# Patient Record
Sex: FEMALE | Race: WHITE | NOT HISPANIC OR LATINO | Employment: OTHER | URBAN - NONMETROPOLITAN AREA
[De-identification: names, ages, dates, MRNs, and addresses within clinical notes are randomized per-mention and may not be internally consistent; named-entity substitution may affect disease eponyms.]

---

## 2023-02-27 LAB
ALANINE AMINOTRANSFERASE (SGPT) (U/L) IN SER/PLAS: 37 U/L (ref 7–45)
ALBUMIN (G/DL) IN SER/PLAS: 4.4 G/DL (ref 3.4–5)
ALKALINE PHOSPHATASE (U/L) IN SER/PLAS: 95 U/L (ref 33–136)
ANION GAP IN SER/PLAS: 12 MMOL/L (ref 10–20)
ASPARTATE AMINOTRANSFERASE (SGOT) (U/L) IN SER/PLAS: 25 U/L (ref 9–39)
BILIRUBIN TOTAL (MG/DL) IN SER/PLAS: 1 MG/DL (ref 0–1.2)
CALCIUM (MG/DL) IN SER/PLAS: 9.4 MG/DL (ref 8.6–10.3)
CARBON DIOXIDE, TOTAL (MMOL/L) IN SER/PLAS: 31 MMOL/L (ref 21–32)
CHLORIDE (MMOL/L) IN SER/PLAS: 101 MMOL/L (ref 98–107)
CREATININE (MG/DL) IN SER/PLAS: 0.74 MG/DL (ref 0.5–1.05)
GFR FEMALE: >90 ML/MIN/1.73M2
GLUCOSE (MG/DL) IN SER/PLAS: 89 MG/DL (ref 74–99)
POTASSIUM (MMOL/L) IN SER/PLAS: 4.1 MMOL/L (ref 3.5–5.3)
PROTEIN TOTAL: 7.4 G/DL (ref 6.4–8.2)
SODIUM (MMOL/L) IN SER/PLAS: 140 MMOL/L (ref 136–145)
THYROTROPIN (MIU/L) IN SER/PLAS BY DETECTION LIMIT <= 0.05 MIU/L: 1.51 MIU/L (ref 0.44–3.98)
UREA NITROGEN (MG/DL) IN SER/PLAS: 17 MG/DL (ref 6–23)

## 2023-02-28 LAB
ESTIMATED AVERAGE GLUCOSE FOR HBA1C: 120 MG/DL
HEMOGLOBIN A1C/HEMOGLOBIN TOTAL IN BLOOD: 5.8 %

## 2023-05-17 PROBLEM — E78.00 HYPERCHOLESTEROLEMIA: Status: ACTIVE | Noted: 2023-05-17

## 2023-05-17 RX ORDER — IBUPROFEN 800 MG/1
800 TABLET ORAL EVERY 8 HOURS PRN
COMMUNITY
End: 2024-03-25 | Stop reason: HOSPADM

## 2023-05-17 RX ORDER — CHOLECALCIFEROL (VITAMIN D3) 25 MCG
25 TABLET ORAL DAILY
COMMUNITY

## 2023-05-17 RX ORDER — ASPIRIN 81 MG/1
81 TABLET ORAL EVERY OTHER DAY
COMMUNITY
End: 2024-03-25 | Stop reason: HOSPADM

## 2023-05-17 RX ORDER — VITAMIN E MIXED 400 UNIT
400 CAPSULE ORAL DAILY
COMMUNITY

## 2023-05-17 RX ORDER — LANOLIN ALCOHOL/MO/W.PET/CERES
100 CREAM (GRAM) TOPICAL DAILY
COMMUNITY

## 2023-05-17 RX ORDER — SIMVASTATIN 20 MG/1
20 TABLET, FILM COATED ORAL NIGHTLY
COMMUNITY
End: 2023-08-23

## 2023-05-17 RX ORDER — GARLIC 1000 MG
CAPSULE ORAL
COMMUNITY
End: 2024-02-22 | Stop reason: ALTCHOICE

## 2023-05-17 RX ORDER — ASCORBIC ACID 500 MG
500 TABLET ORAL DAILY
COMMUNITY
End: 2024-03-28 | Stop reason: SINTOL

## 2023-05-23 ENCOUNTER — OFFICE VISIT (OUTPATIENT)
Dept: PRIMARY CARE | Facility: CLINIC | Age: 63
End: 2023-05-23
Payer: COMMERCIAL

## 2023-05-23 VITALS
HEART RATE: 78 BPM | SYSTOLIC BLOOD PRESSURE: 110 MMHG | OXYGEN SATURATION: 97 % | WEIGHT: 213.4 LBS | BODY MASS INDEX: 33.49 KG/M2 | HEIGHT: 67 IN | TEMPERATURE: 97.6 F | DIASTOLIC BLOOD PRESSURE: 74 MMHG

## 2023-05-23 DIAGNOSIS — K21.9 GASTROESOPHAGEAL REFLUX DISEASE, UNSPECIFIED WHETHER ESOPHAGITIS PRESENT: ICD-10-CM

## 2023-05-23 DIAGNOSIS — Z00.00 ANNUAL PHYSICAL EXAM: Primary | ICD-10-CM

## 2023-05-23 DIAGNOSIS — E78.00 HYPERCHOLESTEROLEMIA: ICD-10-CM

## 2023-05-23 DIAGNOSIS — Z12.31 ENCOUNTER FOR SCREENING MAMMOGRAM FOR MALIGNANT NEOPLASM OF BREAST: ICD-10-CM

## 2023-05-23 PROCEDURE — 1036F TOBACCO NON-USER: CPT | Performed by: INTERNAL MEDICINE

## 2023-05-23 PROCEDURE — 99214 OFFICE O/P EST MOD 30 MIN: CPT | Performed by: INTERNAL MEDICINE

## 2023-05-23 ASSESSMENT — ENCOUNTER SYMPTOMS
BRUISES/BLEEDS EASILY: 0
HEADACHES: 0
DIZZINESS: 0
SINUS PAIN: 0
PALPITATIONS: 0
LOSS OF SENSATION IN FEET: 0
WHEEZING: 0
FATIGUE: 0
UNEXPECTED WEIGHT CHANGE: 0
SORE THROAT: 0
COUGH: 0
ABDOMINAL PAIN: 0
DEPRESSION: 0
DIFFICULTY URINATING: 0
OCCASIONAL FEELINGS OF UNSTEADINESS: 0
BLOOD IN STOOL: 0
DIARRHEA: 0
ARTHRALGIAS: 0
FEVER: 0

## 2023-05-23 ASSESSMENT — ANXIETY QUESTIONNAIRES
GAD7 TOTAL SCORE: 1
6. BECOMING EASILY ANNOYED OR IRRITABLE: NOT AT ALL
4. TROUBLE RELAXING: NOT AT ALL
7. FEELING AFRAID AS IF SOMETHING AWFUL MIGHT HAPPEN: NOT AT ALL
2. NOT BEING ABLE TO STOP OR CONTROL WORRYING: NOT AT ALL
3. WORRYING TOO MUCH ABOUT DIFFERENT THINGS: SEVERAL DAYS
5. BEING SO RESTLESS THAT IT IS HARD TO SIT STILL: NOT AT ALL
1. FEELING NERVOUS, ANXIOUS, OR ON EDGE: NOT AT ALL
IF YOU CHECKED OFF ANY PROBLEMS ON THIS QUESTIONNAIRE, HOW DIFFICULT HAVE THESE PROBLEMS MADE IT FOR YOU TO DO YOUR WORK, TAKE CARE OF THINGS AT HOME, OR GET ALONG WITH OTHER PEOPLE: NOT DIFFICULT AT ALL

## 2023-05-23 ASSESSMENT — PATIENT HEALTH QUESTIONNAIRE - PHQ9
2. FEELING DOWN, DEPRESSED OR HOPELESS: NOT AT ALL
1. LITTLE INTEREST OR PLEASURE IN DOING THINGS: NOT AT ALL
SUM OF ALL RESPONSES TO PHQ9 QUESTIONS 1 AND 2: 0

## 2023-05-23 NOTE — PROGRESS NOTES
Subjective   Patient ID: Reanna Pineda is a 62 y.o. female who presents for Annual Exam, Results, and Hyperlipidemia.    - Low-dose CT scanning very low score 6 on the LAD patient to maximize medical management  - Reflux disease symptoms not persistent counseled about meal size on diet controlled follow-up if no improvement  - Hypercholesterolemia continue simvastatin repeat lipid profile in August  - Needs mammogram in August 2023  - Thyroid function test and CMP within normal limits patient reassured  - Morbid obesity improving BMI now 33  I spent >15minutes minutes face to face with individial providing recommendations for nutrition choices and exercise plan to help achieve weight reduction.  - Needs Pap smear refer patient to GYN  -History of largely perianal sepsis required surgery twice and admission with IV antibiotics for 1 week and [facility] now resolved with  -Screening colonoscopy done in 2021 need to repeat in 2026  -Patient consumes large amount of beer on a weekly basis counseled about alcohol abstinence    Hyperlipidemia  Pertinent negatives include no chest pain.          Review of Systems   Constitutional:  Negative for fatigue, fever and unexpected weight change.   HENT:  Negative for congestion, ear discharge, ear pain, mouth sores, sinus pain and sore throat.    Eyes:  Negative for visual disturbance.   Respiratory:  Negative for cough and wheezing.    Cardiovascular:  Negative for chest pain, palpitations and leg swelling.   Gastrointestinal:  Negative for abdominal pain, blood in stool and diarrhea.   Genitourinary:  Negative for difficulty urinating.   Musculoskeletal:  Negative for arthralgias.   Skin:  Negative for rash.   Neurological:  Negative for dizziness and headaches.   Hematological:  Does not bruise/bleed easily.   Psychiatric/Behavioral:  Negative for behavioral problems.    All other systems reviewed and are negative.      Objective   Lab Results   Component Value Date     "HGBA1C 5.8 (A) 02/27/2023      /74   Pulse 78   Temp 36.4 °C (97.6 °F)   Ht 1.708 m (5' 7.25\")   Wt 96.8 kg (213 lb 6.4 oz)   SpO2 97%   BMI 33.18 kg/m²   Lab Results   Component Value Date    ALT 37 02/27/2023    AST 25 02/27/2023     02/27/2023    K 4.1 02/27/2023     02/27/2023    CREATININE 0.74 02/27/2023    BUN 17 02/27/2023    CO2 31 02/27/2023    TSH 1.51 02/27/2023    HGBA1C 5.8 (A) 02/27/2023     par LM 0  LAD 6.34  LCx 0  RCA 0     Total 6.3  Physical Exam  Vitals and nursing note reviewed.   Constitutional:       Appearance: Normal appearance.   HENT:      Head: Normocephalic.      Nose: Nose normal.   Eyes:      Conjunctiva/sclera: Conjunctivae normal.      Pupils: Pupils are equal, round, and reactive to light.   Cardiovascular:      Rate and Rhythm: Regular rhythm.   Pulmonary:      Effort: Pulmonary effort is normal.      Breath sounds: Normal breath sounds.   Abdominal:      General: Abdomen is flat.      Palpations: Abdomen is soft.   Musculoskeletal:      Cervical back: Neck supple.   Skin:     General: Skin is warm.   Neurological:      General: No focal deficit present.      Mental Status: She is oriented to person, place, and time.   Psychiatric:         Mood and Affect: Mood normal.         Assessment/Plan   Reanna was seen today for annual exam, results and hyperlipidemia.  Diagnoses and all orders for this visit:  Annual physical exam (Primary)  -     Lipid Panel; Future  Hypercholesterolemia  -     Lipid Panel; Future  Gastroesophageal reflux disease, unspecified whether esophagitis present  Encounter for screening mammogram for malignant neoplasm of breast  -     BI mammo bilateral screening tomosynthesis; Future  Other orders  -     Follow Up In Primary Care; Future   - Low-dose CT scanning very low score 6 on the LAD patient to maximize medical management  - Reflux disease symptoms not persistent counseled about meal size on diet controlled follow-up if no " improvement  - Hypercholesterolemia continue simvastatin repeat lipid profile in August  - Needs mammogram in August 2023  - Thyroid function test and CMP within normal limits patient reassured  - Morbid obesity improving BMI now 33  I spent >15minutes minutes face to face with individial providing recommendations for nutrition choices and exercise plan to help achieve weight reduction.  - Needs Pap smear refer patient to GYN  -History of largely perianal sepsis required surgery twice and admission with IV antibiotics for 1 week and [facility] now resolved with  -Screening colonoscopy done in 2021 need to repeat in 2026  -Patient consumes large amount of beer on a weekly basis counseled about alcohol abstinence

## 2023-08-08 ENCOUNTER — LAB (OUTPATIENT)
Dept: LAB | Facility: LAB | Age: 63
End: 2023-08-08
Payer: COMMERCIAL

## 2023-08-08 DIAGNOSIS — Z00.00 ANNUAL PHYSICAL EXAM: ICD-10-CM

## 2023-08-08 DIAGNOSIS — E78.00 HYPERCHOLESTEROLEMIA: ICD-10-CM

## 2023-08-08 LAB
CHOLESTEROL (MG/DL) IN SER/PLAS: 230 MG/DL (ref 0–199)
CHOLESTEROL IN HDL (MG/DL) IN SER/PLAS: 72.7 MG/DL
CHOLESTEROL/HDL RATIO: 3.2
LDL: 132 MG/DL (ref 0–99)
TRIGLYCERIDE (MG/DL) IN SER/PLAS: 127 MG/DL (ref 0–149)
VLDL: 25 MG/DL (ref 0–40)

## 2023-08-08 PROCEDURE — 80061 LIPID PANEL: CPT

## 2023-08-08 PROCEDURE — 36415 COLL VENOUS BLD VENIPUNCTURE: CPT

## 2023-08-22 DIAGNOSIS — E78.00 HYPERCHOLESTEROLEMIA: ICD-10-CM

## 2023-08-23 ENCOUNTER — OFFICE VISIT (OUTPATIENT)
Dept: PRIMARY CARE | Facility: CLINIC | Age: 63
End: 2023-08-23
Payer: COMMERCIAL

## 2023-08-23 VITALS
HEART RATE: 77 BPM | TEMPERATURE: 97.6 F | OXYGEN SATURATION: 100 % | WEIGHT: 210.6 LBS | BODY MASS INDEX: 32.74 KG/M2 | SYSTOLIC BLOOD PRESSURE: 124 MMHG | DIASTOLIC BLOOD PRESSURE: 80 MMHG

## 2023-08-23 DIAGNOSIS — Z12.31 ENCOUNTER FOR SCREENING MAMMOGRAM FOR MALIGNANT NEOPLASM OF BREAST: ICD-10-CM

## 2023-08-23 DIAGNOSIS — Z00.00 ANNUAL PHYSICAL EXAM: ICD-10-CM

## 2023-08-23 DIAGNOSIS — E78.00 HYPERCHOLESTEROLEMIA: Primary | ICD-10-CM

## 2023-08-23 PROCEDURE — 99214 OFFICE O/P EST MOD 30 MIN: CPT | Performed by: INTERNAL MEDICINE

## 2023-08-23 PROCEDURE — 1036F TOBACCO NON-USER: CPT | Performed by: INTERNAL MEDICINE

## 2023-08-23 RX ORDER — SIMVASTATIN 20 MG/1
20 TABLET, FILM COATED ORAL NIGHTLY
Qty: 90 TABLET | Refills: 1 | Status: SHIPPED | OUTPATIENT
Start: 2023-08-23 | End: 2023-08-23 | Stop reason: SDUPTHER

## 2023-08-23 RX ORDER — SIMVASTATIN 20 MG/1
20 TABLET, FILM COATED ORAL NIGHTLY
Qty: 90 TABLET | Refills: 1 | Status: SHIPPED | OUTPATIENT
Start: 2023-08-23 | End: 2024-02-26 | Stop reason: SDUPTHER

## 2023-08-23 ASSESSMENT — ENCOUNTER SYMPTOMS
BRUISES/BLEEDS EASILY: 0
SORE THROAT: 0
DIFFICULTY URINATING: 0
DIZZINESS: 0
WHEEZING: 0
SINUS PAIN: 0
COUGH: 0
HEADACHES: 0
ARTHRALGIAS: 0
DIARRHEA: 0
BLOOD IN STOOL: 0
UNEXPECTED WEIGHT CHANGE: 0
FATIGUE: 0
FEVER: 0
ABDOMINAL PAIN: 0
PALPITATIONS: 0

## 2023-08-23 NOTE — PROGRESS NOTES
Subjective   Patient ID: Reanna Pineda is a 62 y.o. female who presents for 3 month medical management and Leg Swelling (Bilateral-  worse at end of day).    - Hypercholesterolemia patient counseled about maximizing low-fat diet low triglyceride diet  Counseled about weight loss  Continue simvastatin  Repeat blood work in our exam in 6 months  - Low-dose CT scanning very low score 6 on the LAD patient to maximize medical management  - Reflux disease symptoms not persistent counseled about meal size on diet controlled follow-up if no improvement.  -Obesity BMI now 32  I spent >15minutes minutes face to face with individial providing recommendations for nutrition choices and exercise plan to help achieve weight reduction.  -History of largely perianal sepsis required surgery twice and admission with IV antibiotics for 1 week and [facility] now resolved with  -Screening colonoscopy done in 2021 need to repeat in 2026  -Patient consumes large amount of beer on a weekly basis counseled about alcohol abstinence.             Review of Systems   Constitutional:  Negative for fatigue, fever and unexpected weight change.   HENT:  Negative for congestion, ear discharge, ear pain, mouth sores, sinus pain and sore throat.    Eyes:  Negative for visual disturbance.   Respiratory:  Negative for cough and wheezing.    Cardiovascular:  Negative for chest pain, palpitations and leg swelling.   Gastrointestinal:  Negative for abdominal pain, blood in stool and diarrhea.   Genitourinary:  Negative for difficulty urinating.   Musculoskeletal:  Negative for arthralgias.   Skin:  Negative for rash.   Neurological:  Negative for dizziness and headaches.   Hematological:  Does not bruise/bleed easily.   Psychiatric/Behavioral:  Negative for behavioral problems.    All other systems reviewed and are negative.      Objective   Lab Results   Component Value Date    HGBA1C 5.8 (A) 02/27/2023      /80   Pulse 77   Temp 36.4 °C  (97.6 °F)   Wt 95.5 kg (210 lb 9.6 oz)   SpO2 100%   BMI 32.74 kg/m²   Lab Results   Component Value Date    CHOL 230 (H) 08/08/2023    TRIG 127 08/08/2023    HDL 72.7 08/08/2023    ALT 37 02/27/2023    AST 25 02/27/2023     02/27/2023    K 4.1 02/27/2023     02/27/2023    CREATININE 0.74 02/27/2023    BUN 17 02/27/2023    CO2 31 02/27/2023    TSH 1.51 02/27/2023    HGBA1C 5.8 (A) 02/27/2023     par   Physical Exam  Vitals and nursing note reviewed.   Constitutional:       Appearance: Normal appearance.   HENT:      Head: Normocephalic.      Nose: Nose normal.   Eyes:      Conjunctiva/sclera: Conjunctivae normal.      Pupils: Pupils are equal, round, and reactive to light.   Cardiovascular:      Rate and Rhythm: Regular rhythm.   Pulmonary:      Effort: Pulmonary effort is normal.      Breath sounds: Normal breath sounds.   Abdominal:      General: Abdomen is flat.      Palpations: Abdomen is soft.   Musculoskeletal:      Cervical back: Neck supple.   Skin:     General: Skin is warm.   Neurological:      General: No focal deficit present.      Mental Status: She is oriented to person, place, and time.   Psychiatric:         Mood and Affect: Mood normal.         Assessment/Plan   Reanna was seen today for 3 month medical management and leg swelling.  Diagnoses and all orders for this visit:  Hypercholesterolemia (Primary)  -     simvastatin (Zocor) 20 mg tablet; Take 1 tablet (20 mg) by mouth once daily at bedtime.  Encounter for screening mammogram for malignant neoplasm of breast  -     BI mammo bilateral screening tomosynthesis; Future  Annual physical exam  -     CBC and Auto Differential; Future  -     Comprehensive Metabolic Panel; Future  -     Lipid Panel; Future  -     TSH with reflex to Free T4 if abnormal; Future  -     Hemoglobin A1C; Future  Other orders  -     Follow Up In Primary Care  -     Follow Up In Primary Care - Health Maintenance; Future   - Hypercholesterolemia patient counseled  about maximizing low-fat diet low triglyceride diet  Counseled about weight loss  Continue simvastatin  Repeat blood work in our exam in 6 months  - Low-dose CT scanning very low score 6 on the LAD patient to maximize medical management  - Reflux disease symptoms not persistent counseled about meal size on diet controlled follow-up if no improvement.  -Obesity BMI now 32  I spent >15minutes minutes face to face with individial providing recommendations for nutrition choices and exercise plan to help achieve weight reduction.  -History of largely perianal sepsis required surgery twice and admission with IV antibiotics for 1 week and [facility] now resolved with  -Screening colonoscopy done in 2021 need to repeat in 2026  -Patient consumes large amount of beer on a weekly basis counseled about alcohol abstinence.

## 2023-12-27 ENCOUNTER — LAB (OUTPATIENT)
Dept: LAB | Facility: LAB | Age: 63
End: 2023-12-27
Payer: COMMERCIAL

## 2023-12-27 DIAGNOSIS — Z00.00 ANNUAL PHYSICAL EXAM: ICD-10-CM

## 2023-12-27 LAB
ALBUMIN SERPL BCP-MCNC: 4.5 G/DL (ref 3.4–5)
ALP SERPL-CCNC: 85 U/L (ref 33–136)
ALT SERPL W P-5'-P-CCNC: 34 U/L (ref 7–45)
ANION GAP SERPL CALC-SCNC: 12 MMOL/L (ref 10–20)
AST SERPL W P-5'-P-CCNC: 24 U/L (ref 9–39)
BASOPHILS # BLD AUTO: 0.03 X10*3/UL (ref 0–0.1)
BASOPHILS NFR BLD AUTO: 0.5 %
BILIRUB SERPL-MCNC: 1 MG/DL (ref 0–1.2)
BUN SERPL-MCNC: 14 MG/DL (ref 6–23)
CALCIUM SERPL-MCNC: 9.4 MG/DL (ref 8.6–10.3)
CHLORIDE SERPL-SCNC: 100 MMOL/L (ref 98–107)
CHOLEST SERPL-MCNC: 231 MG/DL (ref 0–199)
CHOLESTEROL/HDL RATIO: 3.3
CO2 SERPL-SCNC: 31 MMOL/L (ref 21–32)
CREAT SERPL-MCNC: 0.79 MG/DL (ref 0.5–1.05)
EOSINOPHIL # BLD AUTO: 0.14 X10*3/UL (ref 0–0.7)
EOSINOPHIL NFR BLD AUTO: 2.4 %
ERYTHROCYTE [DISTWIDTH] IN BLOOD BY AUTOMATED COUNT: 13.3 % (ref 11.5–14.5)
EST. AVERAGE GLUCOSE BLD GHB EST-MCNC: 114 MG/DL
GFR SERPL CREATININE-BSD FRML MDRD: 84 ML/MIN/1.73M*2
GLUCOSE SERPL-MCNC: 107 MG/DL (ref 74–99)
HBA1C MFR BLD: 5.6 %
HCT VFR BLD AUTO: 46.3 % (ref 36–46)
HDLC SERPL-MCNC: 70.7 MG/DL
HGB BLD-MCNC: 14.8 G/DL (ref 12–16)
IMM GRANULOCYTES # BLD AUTO: 0.02 X10*3/UL (ref 0–0.7)
IMM GRANULOCYTES NFR BLD AUTO: 0.3 % (ref 0–0.9)
LDLC SERPL CALC-MCNC: 135 MG/DL
LYMPHOCYTES # BLD AUTO: 2.12 X10*3/UL (ref 1.2–4.8)
LYMPHOCYTES NFR BLD AUTO: 35.6 %
MCH RBC QN AUTO: 29.8 PG (ref 26–34)
MCHC RBC AUTO-ENTMCNC: 32 G/DL (ref 32–36)
MCV RBC AUTO: 93 FL (ref 80–100)
MONOCYTES # BLD AUTO: 0.56 X10*3/UL (ref 0.1–1)
MONOCYTES NFR BLD AUTO: 9.4 %
NEUTROPHILS # BLD AUTO: 3.08 X10*3/UL (ref 1.2–7.7)
NEUTROPHILS NFR BLD AUTO: 51.8 %
NON HDL CHOLESTEROL: 160 MG/DL (ref 0–149)
NRBC BLD-RTO: 0 /100 WBCS (ref 0–0)
PLATELET # BLD AUTO: 252 X10*3/UL (ref 150–450)
POTASSIUM SERPL-SCNC: 4.3 MMOL/L (ref 3.5–5.3)
PROT SERPL-MCNC: 7 G/DL (ref 6.4–8.2)
RBC # BLD AUTO: 4.96 X10*6/UL (ref 4–5.2)
SODIUM SERPL-SCNC: 139 MMOL/L (ref 136–145)
TRIGL SERPL-MCNC: 125 MG/DL (ref 0–149)
TSH SERPL-ACNC: 3.91 MIU/L (ref 0.44–3.98)
VLDL: 25 MG/DL (ref 0–40)
WBC # BLD AUTO: 6 X10*3/UL (ref 4.4–11.3)

## 2023-12-27 PROCEDURE — 36415 COLL VENOUS BLD VENIPUNCTURE: CPT

## 2023-12-27 PROCEDURE — 83036 HEMOGLOBIN GLYCOSYLATED A1C: CPT

## 2024-01-18 ENCOUNTER — APPOINTMENT (OUTPATIENT)
Dept: CARDIOLOGY | Facility: HOSPITAL | Age: 64
End: 2024-01-18
Payer: COMMERCIAL

## 2024-01-18 ENCOUNTER — HOSPITAL ENCOUNTER (OUTPATIENT)
Facility: HOSPITAL | Age: 64
Setting detail: OBSERVATION
Discharge: HOME | End: 2024-01-19
Attending: EMERGENCY MEDICINE | Admitting: INTERNAL MEDICINE
Payer: COMMERCIAL

## 2024-01-18 ENCOUNTER — APPOINTMENT (OUTPATIENT)
Dept: RADIOLOGY | Facility: HOSPITAL | Age: 64
End: 2024-01-18
Payer: COMMERCIAL

## 2024-01-18 DIAGNOSIS — R42 VERTIGO: ICD-10-CM

## 2024-01-18 DIAGNOSIS — K62.89 ANUSITIS: Primary | ICD-10-CM

## 2024-01-18 LAB
ALBUMIN SERPL BCP-MCNC: 4.3 G/DL (ref 3.4–5)
ALP SERPL-CCNC: 87 U/L (ref 33–136)
ALT SERPL W P-5'-P-CCNC: 40 U/L (ref 7–45)
ANION GAP SERPL CALC-SCNC: 13 MMOL/L (ref 10–20)
APPEARANCE UR: CLEAR
AST SERPL W P-5'-P-CCNC: 26 U/L (ref 9–39)
BASOPHILS # BLD AUTO: 0.02 X10*3/UL (ref 0–0.1)
BASOPHILS NFR BLD AUTO: 0.3 %
BILIRUB SERPL-MCNC: 0.9 MG/DL (ref 0–1.2)
BILIRUB UR STRIP.AUTO-MCNC: NEGATIVE MG/DL
BNP SERPL-MCNC: 22 PG/ML (ref 0–99)
BUN SERPL-MCNC: 14 MG/DL (ref 6–23)
CALCIUM SERPL-MCNC: 9.7 MG/DL (ref 8.6–10.3)
CARDIAC TROPONIN I PNL SERPL HS: <3 NG/L (ref 0–13)
CARDIAC TROPONIN I PNL SERPL HS: <3 NG/L (ref 0–13)
CHLORIDE SERPL-SCNC: 103 MMOL/L (ref 98–107)
CHOLEST SERPL-MCNC: 216 MG/DL (ref 0–199)
CHOLESTEROL/HDL RATIO: 3.2
CO2 SERPL-SCNC: 30 MMOL/L (ref 21–32)
COLOR UR: YELLOW
CREAT SERPL-MCNC: 0.72 MG/DL (ref 0.5–1.05)
EGFRCR SERPLBLD CKD-EPI 2021: >90 ML/MIN/1.73M*2
EOSINOPHIL # BLD AUTO: 0.05 X10*3/UL (ref 0–0.7)
EOSINOPHIL NFR BLD AUTO: 0.7 %
ERYTHROCYTE [DISTWIDTH] IN BLOOD BY AUTOMATED COUNT: 13.2 % (ref 11.5–14.5)
GLUCOSE SERPL-MCNC: 105 MG/DL (ref 74–99)
GLUCOSE UR STRIP.AUTO-MCNC: NEGATIVE MG/DL
HCT VFR BLD AUTO: 46.8 % (ref 36–46)
HDLC SERPL-MCNC: 66.7 MG/DL
HGB BLD-MCNC: 15.2 G/DL (ref 12–16)
IMM GRANULOCYTES # BLD AUTO: 0.01 X10*3/UL (ref 0–0.7)
IMM GRANULOCYTES NFR BLD AUTO: 0.1 % (ref 0–0.9)
KETONES UR STRIP.AUTO-MCNC: ABNORMAL MG/DL
LACTATE SERPL-SCNC: 1.1 MMOL/L (ref 0.4–2)
LDLC SERPL CALC-MCNC: 135 MG/DL
LEUKOCYTE ESTERASE UR QL STRIP.AUTO: NEGATIVE
LYMPHOCYTES # BLD AUTO: 1.65 X10*3/UL (ref 1.2–4.8)
LYMPHOCYTES NFR BLD AUTO: 23.6 %
MCH RBC QN AUTO: 30.2 PG (ref 26–34)
MCHC RBC AUTO-ENTMCNC: 32.5 G/DL (ref 32–36)
MCV RBC AUTO: 93 FL (ref 80–100)
MONOCYTES # BLD AUTO: 0.46 X10*3/UL (ref 0.1–1)
MONOCYTES NFR BLD AUTO: 6.6 %
NEUTROPHILS # BLD AUTO: 4.81 X10*3/UL (ref 1.2–7.7)
NEUTROPHILS NFR BLD AUTO: 68.7 %
NITRITE UR QL STRIP.AUTO: NEGATIVE
NON HDL CHOLESTEROL: 149 MG/DL (ref 0–149)
NRBC BLD-RTO: 0 /100 WBCS (ref 0–0)
PH UR STRIP.AUTO: 6 [PH]
PLATELET # BLD AUTO: 257 X10*3/UL (ref 150–450)
POTASSIUM SERPL-SCNC: 4.7 MMOL/L (ref 3.5–5.3)
PROT SERPL-MCNC: 7.1 G/DL (ref 6.4–8.2)
PROT UR STRIP.AUTO-MCNC: NEGATIVE MG/DL
RBC # BLD AUTO: 5.03 X10*6/UL (ref 4–5.2)
RBC # UR STRIP.AUTO: NEGATIVE /UL
SODIUM SERPL-SCNC: 141 MMOL/L (ref 136–145)
SP GR UR STRIP.AUTO: 1.04
TRIGL SERPL-MCNC: 72 MG/DL (ref 0–149)
UROBILINOGEN UR STRIP.AUTO-MCNC: <2 MG/DL
VLDL: 14 MG/DL (ref 0–40)
WBC # BLD AUTO: 7 X10*3/UL (ref 4.4–11.3)

## 2024-01-18 PROCEDURE — 2500000004 HC RX 250 GENERAL PHARMACY W/ HCPCS (ALT 636 FOR OP/ED): Performed by: NURSE PRACTITIONER

## 2024-01-18 PROCEDURE — 84484 ASSAY OF TROPONIN QUANT: CPT | Performed by: EMERGENCY MEDICINE

## 2024-01-18 PROCEDURE — 93005 ELECTROCARDIOGRAM TRACING: CPT

## 2024-01-18 PROCEDURE — 81003 URINALYSIS AUTO W/O SCOPE: CPT | Performed by: EMERGENCY MEDICINE

## 2024-01-18 PROCEDURE — 83880 ASSAY OF NATRIURETIC PEPTIDE: CPT | Performed by: NURSE PRACTITIONER

## 2024-01-18 PROCEDURE — 74177 CT ABD & PELVIS W/CONTRAST: CPT | Mod: FOREIGN READ | Performed by: RADIOLOGY

## 2024-01-18 PROCEDURE — 87040 BLOOD CULTURE FOR BACTERIA: CPT | Mod: GENLAB | Performed by: EMERGENCY MEDICINE

## 2024-01-18 PROCEDURE — G0378 HOSPITAL OBSERVATION PER HR: HCPCS

## 2024-01-18 PROCEDURE — 36415 COLL VENOUS BLD VENIPUNCTURE: CPT | Performed by: EMERGENCY MEDICINE

## 2024-01-18 PROCEDURE — 2500000004 HC RX 250 GENERAL PHARMACY W/ HCPCS (ALT 636 FOR OP/ED): Performed by: EMERGENCY MEDICINE

## 2024-01-18 PROCEDURE — 99285 EMERGENCY DEPT VISIT HI MDM: CPT | Performed by: EMERGENCY MEDICINE

## 2024-01-18 PROCEDURE — 83605 ASSAY OF LACTIC ACID: CPT | Performed by: EMERGENCY MEDICINE

## 2024-01-18 PROCEDURE — 2550000001 HC RX 255 CONTRASTS: Performed by: EMERGENCY MEDICINE

## 2024-01-18 PROCEDURE — 80061 LIPID PANEL: CPT | Performed by: NURSE PRACTITIONER

## 2024-01-18 PROCEDURE — 85025 COMPLETE CBC W/AUTO DIFF WBC: CPT | Performed by: EMERGENCY MEDICINE

## 2024-01-18 PROCEDURE — 70450 CT HEAD/BRAIN W/O DYE: CPT | Performed by: RADIOLOGY

## 2024-01-18 PROCEDURE — 2500000004 HC RX 250 GENERAL PHARMACY W/ HCPCS (ALT 636 FOR OP/ED)

## 2024-01-18 PROCEDURE — 70450 CT HEAD/BRAIN W/O DYE: CPT

## 2024-01-18 PROCEDURE — 80053 COMPREHEN METABOLIC PANEL: CPT | Performed by: EMERGENCY MEDICINE

## 2024-01-18 PROCEDURE — 83036 HEMOGLOBIN GLYCOSYLATED A1C: CPT | Mod: GENLAB | Performed by: NURSE PRACTITIONER

## 2024-01-18 PROCEDURE — 96365 THER/PROPH/DIAG IV INF INIT: CPT

## 2024-01-18 PROCEDURE — 96367 TX/PROPH/DG ADDL SEQ IV INF: CPT

## 2024-01-18 PROCEDURE — 74177 CT ABD & PELVIS W/CONTRAST: CPT | Mod: FR

## 2024-01-18 RX ORDER — CHOLECALCIFEROL (VITAMIN D3) 25 MCG
2000 TABLET ORAL DAILY
Status: DISCONTINUED | OUTPATIENT
Start: 2024-01-18 | End: 2024-01-19 | Stop reason: HOSPADM

## 2024-01-18 RX ORDER — ONDANSETRON HYDROCHLORIDE 2 MG/ML
4 INJECTION, SOLUTION INTRAVENOUS EVERY 8 HOURS PRN
Status: DISCONTINUED | OUTPATIENT
Start: 2024-01-18 | End: 2024-01-19 | Stop reason: HOSPADM

## 2024-01-18 RX ORDER — SODIUM CHLORIDE 9 MG/ML
INJECTION, SOLUTION INTRAVENOUS
Status: DISPENSED
Start: 2024-01-18 | End: 2024-01-19

## 2024-01-18 RX ORDER — HYDRALAZINE HYDROCHLORIDE 20 MG/ML
10 INJECTION INTRAMUSCULAR; INTRAVENOUS
Status: DISCONTINUED | OUTPATIENT
Start: 2024-01-18 | End: 2024-01-19 | Stop reason: HOSPADM

## 2024-01-18 RX ORDER — PANTOPRAZOLE SODIUM 40 MG/1
40 TABLET, DELAYED RELEASE ORAL
Status: DISCONTINUED | OUTPATIENT
Start: 2024-01-19 | End: 2024-01-19 | Stop reason: HOSPADM

## 2024-01-18 RX ORDER — ATORVASTATIN CALCIUM 40 MG/1
40 TABLET, FILM COATED ORAL NIGHTLY
Status: DISCONTINUED | OUTPATIENT
Start: 2024-01-18 | End: 2024-01-18

## 2024-01-18 RX ORDER — ONDANSETRON 4 MG/1
4 TABLET, FILM COATED ORAL EVERY 8 HOURS PRN
Status: DISCONTINUED | OUTPATIENT
Start: 2024-01-18 | End: 2024-01-19 | Stop reason: HOSPADM

## 2024-01-18 RX ORDER — LABETALOL HYDROCHLORIDE 5 MG/ML
10 INJECTION, SOLUTION INTRAVENOUS EVERY 10 MIN PRN
Status: DISCONTINUED | OUTPATIENT
Start: 2024-01-18 | End: 2024-01-19 | Stop reason: HOSPADM

## 2024-01-18 RX ORDER — VITAMIN E MIXED 400 UNIT
400 CAPSULE ORAL DAILY
Status: DISCONTINUED | OUTPATIENT
Start: 2024-01-18 | End: 2024-01-19 | Stop reason: HOSPADM

## 2024-01-18 RX ORDER — ACETAMINOPHEN 325 MG/1
650 TABLET ORAL EVERY 4 HOURS PRN
Status: DISCONTINUED | OUTPATIENT
Start: 2024-01-18 | End: 2024-01-19 | Stop reason: HOSPADM

## 2024-01-18 RX ORDER — ACETAMINOPHEN 650 MG/1
650 SUPPOSITORY RECTAL EVERY 4 HOURS PRN
Status: DISCONTINUED | OUTPATIENT
Start: 2024-01-18 | End: 2024-01-19 | Stop reason: HOSPADM

## 2024-01-18 RX ORDER — POLYETHYLENE GLYCOL 3350 17 G/17G
17 POWDER, FOR SOLUTION ORAL DAILY PRN
Status: DISCONTINUED | OUTPATIENT
Start: 2024-01-18 | End: 2024-01-19 | Stop reason: HOSPADM

## 2024-01-18 RX ORDER — ACETAMINOPHEN 160 MG/5ML
650 SOLUTION ORAL EVERY 4 HOURS PRN
Status: DISCONTINUED | OUTPATIENT
Start: 2024-01-18 | End: 2024-01-19 | Stop reason: HOSPADM

## 2024-01-18 RX ORDER — PNV NO.95/FERROUS FUM/FOLIC AC 28MG-0.8MG
100 TABLET ORAL DAILY
Status: DISCONTINUED | OUTPATIENT
Start: 2024-01-18 | End: 2024-01-19 | Stop reason: HOSPADM

## 2024-01-18 RX ORDER — VANCOMYCIN HYDROCHLORIDE 1.25 G/25ML
INJECTION, POWDER, LYOPHILIZED, FOR SOLUTION INTRAVENOUS
Status: DISPENSED
Start: 2024-01-18 | End: 2024-01-19

## 2024-01-18 RX ORDER — HYDRALAZINE HYDROCHLORIDE 25 MG/1
25 TABLET, FILM COATED ORAL EVERY 6 HOURS PRN
Status: DISCONTINUED | OUTPATIENT
Start: 2024-01-20 | End: 2024-01-19 | Stop reason: HOSPADM

## 2024-01-18 RX ORDER — ASPIRIN 81 MG/1
81 TABLET ORAL DAILY
Status: DISCONTINUED | OUTPATIENT
Start: 2024-01-18 | End: 2024-01-19 | Stop reason: HOSPADM

## 2024-01-18 RX ORDER — ASCORBIC ACID 500 MG
500 TABLET ORAL DAILY
Status: DISCONTINUED | OUTPATIENT
Start: 2024-01-18 | End: 2024-01-19 | Stop reason: HOSPADM

## 2024-01-18 RX ORDER — ENOXAPARIN SODIUM 100 MG/ML
40 INJECTION SUBCUTANEOUS EVERY 24 HOURS
Status: DISCONTINUED | OUTPATIENT
Start: 2024-01-18 | End: 2024-01-19 | Stop reason: HOSPADM

## 2024-01-18 RX ORDER — PANTOPRAZOLE SODIUM 40 MG/10ML
40 INJECTION, POWDER, LYOPHILIZED, FOR SOLUTION INTRAVENOUS
Status: DISCONTINUED | OUTPATIENT
Start: 2024-01-19 | End: 2024-01-19 | Stop reason: HOSPADM

## 2024-01-18 RX ORDER — ASPIRIN 81 MG/1
81 TABLET ORAL DAILY
Status: DISCONTINUED | OUTPATIENT
Start: 2024-01-19 | End: 2024-01-18

## 2024-01-18 RX ORDER — SIMVASTATIN 10 MG/1
20 TABLET, FILM COATED ORAL NIGHTLY
Status: DISCONTINUED | OUTPATIENT
Start: 2024-01-18 | End: 2024-01-19 | Stop reason: HOSPADM

## 2024-01-18 RX ADMIN — VANCOMYCIN HYDROCHLORIDE 1.25 G: 1.25 INJECTION, POWDER, LYOPHILIZED, FOR SOLUTION INTRAVENOUS at 20:45

## 2024-01-18 RX ADMIN — ENOXAPARIN SODIUM 40 MG: 40 INJECTION SUBCUTANEOUS at 21:17

## 2024-01-18 RX ADMIN — PIPERACILLIN SODIUM AND TAZOBACTAM SODIUM 4.5 G: 4; .5 INJECTION, SOLUTION INTRAVENOUS at 23:59

## 2024-01-18 RX ADMIN — PIPERACILLIN SODIUM AND TAZOBACTAM SODIUM 3.38 G: 3; .375 INJECTION, SOLUTION INTRAVENOUS at 17:39

## 2024-01-18 RX ADMIN — IOHEXOL 75 ML: 350 INJECTION, SOLUTION INTRAVENOUS at 14:52

## 2024-01-18 RX ADMIN — SIMVASTATIN 20 MG: 10 TABLET, FILM COATED ORAL at 21:16

## 2024-01-18 SDOH — SOCIAL STABILITY: SOCIAL INSECURITY: HAVE YOU HAD THOUGHTS OF HARMING ANYONE ELSE?: NO

## 2024-01-18 SDOH — SOCIAL STABILITY: SOCIAL INSECURITY: WERE YOU ABLE TO COMPLETE ALL THE BEHAVIORAL HEALTH SCREENINGS?: YES

## 2024-01-18 ASSESSMENT — COGNITIVE AND FUNCTIONAL STATUS - GENERAL
PATIENT BASELINE BEDBOUND: NO
MOBILITY SCORE: 24
MOBILITY SCORE: 24
DAILY ACTIVITIY SCORE: 24
DAILY ACTIVITIY SCORE: 24

## 2024-01-18 ASSESSMENT — ACTIVITIES OF DAILY LIVING (ADL)
GROOMING: INDEPENDENT
WALKS IN HOME: INDEPENDENT
FEEDING YOURSELF: INDEPENDENT
TOILETING: INDEPENDENT
BATHING: INDEPENDENT
DRESSING YOURSELF: INDEPENDENT
PATIENT'S MEMORY ADEQUATE TO SAFELY COMPLETE DAILY ACTIVITIES?: YES
LACK_OF_TRANSPORTATION: NO
ADEQUATE_TO_COMPLETE_ADL: YES
HEARING - RIGHT EAR: FUNCTIONAL
HEARING - LEFT EAR: FUNCTIONAL
JUDGMENT_ADEQUATE_SAFELY_COMPLETE_DAILY_ACTIVITIES: YES

## 2024-01-18 ASSESSMENT — PAIN SCALES - GENERAL: PAINLEVEL_OUTOF10: 0 - NO PAIN

## 2024-01-18 ASSESSMENT — LIFESTYLE VARIABLES
AUDIT-C TOTAL SCORE: 8
HOW OFTEN DO YOU HAVE A DRINK CONTAINING ALCOHOL: 4 OR MORE TIMES A WEEK
HOW MANY STANDARD DRINKS CONTAINING ALCOHOL DO YOU HAVE ON A TYPICAL DAY: 3 OR 4
SKIP TO QUESTIONS 9-10: 0
AUDIT-C TOTAL SCORE: 8
HOW OFTEN DO YOU HAVE 6 OR MORE DRINKS ON ONE OCCASION: WEEKLY

## 2024-01-18 ASSESSMENT — PAIN DESCRIPTION - PAIN TYPE: TYPE: ACUTE PAIN

## 2024-01-18 ASSESSMENT — PAIN - FUNCTIONAL ASSESSMENT: PAIN_FUNCTIONAL_ASSESSMENT: 0-10

## 2024-01-18 ASSESSMENT — COLUMBIA-SUICIDE SEVERITY RATING SCALE - C-SSRS
1. IN THE PAST MONTH, HAVE YOU WISHED YOU WERE DEAD OR WISHED YOU COULD GO TO SLEEP AND NOT WAKE UP?: NO
2. HAVE YOU ACTUALLY HAD ANY THOUGHTS OF KILLING YOURSELF?: NO
6. HAVE YOU EVER DONE ANYTHING, STARTED TO DO ANYTHING, OR PREPARED TO DO ANYTHING TO END YOUR LIFE?: NO

## 2024-01-18 ASSESSMENT — PAIN DESCRIPTION - FREQUENCY: FREQUENCY: INTERMITTENT

## 2024-01-18 ASSESSMENT — PATIENT HEALTH QUESTIONNAIRE - PHQ9
2. FEELING DOWN, DEPRESSED OR HOPELESS: NOT AT ALL
1. LITTLE INTEREST OR PLEASURE IN DOING THINGS: NOT AT ALL
SUM OF ALL RESPONSES TO PHQ9 QUESTIONS 1 & 2: 0

## 2024-01-18 ASSESSMENT — PAIN DESCRIPTION - LOCATION: LOCATION: OTHER (COMMENT)

## 2024-01-18 NOTE — ED PROVIDER NOTES
HPI   Chief Complaint   Patient presents with    Illness     Patient states she feels ill and thinks she has an infection in her body. She states she has been having yellow discharge from her rectum for a few weeks. She had an abscess in her rectum in 2021 and is worried it is back. She states the room is spinning and she feels lightheaded.  She has nausea        HPI                    No data recorded                Patient History   No past medical history on file.  No past surgical history on file.  Family History   Problem Relation Name Age of Onset    Other (cardiac problems) Mother      Diabetes Mother      Hypertension Mother      Cancer Mother      Other (cardiac problems) Father      Diabetes Father      Hypertension Father      Cancer Father       Social History     Tobacco Use    Smoking status: Never    Smokeless tobacco: Never   Substance Use Topics    Alcohol use: Yes     Alcohol/week: 24.0 standard drinks of alcohol     Types: 24 Standard drinks or equivalent per week    Drug use: Never       Physical Exam   ED Triage Vitals [01/18/24 1219]   Temp Heart Rate Resp BP   36.7 °C (98.1 °F) 93 16 111/79      SpO2 Temp Source Heart Rate Source Patient Position   94 % Oral Monitor Sitting      BP Location FiO2 (%)     Left arm --       Physical Exam  Constitutional:       General: She is not in acute distress.     Appearance: Normal appearance. She is not toxic-appearing.   HENT:      Head: Normocephalic and atraumatic.      Right Ear: Tympanic membrane normal.      Left Ear: Tympanic membrane normal.      Mouth/Throat:      Mouth: Mucous membranes are moist.      Pharynx: Oropharynx is clear.   Eyes:      Conjunctiva/sclera: Conjunctivae normal.      Pupils: Pupils are equal, round, and reactive to light.   Cardiovascular:      Rate and Rhythm: Normal rate and regular rhythm.      Pulses: Normal pulses.      Heart sounds: Normal heart sounds.   Pulmonary:      Effort: Pulmonary effort is normal. No  respiratory distress.      Breath sounds: Normal breath sounds. No wheezing.   Abdominal:      General: Bowel sounds are normal.      Palpations: Abdomen is soft.      Tenderness: There is no abdominal tenderness. There is no guarding or rebound.   Genitourinary:         Comments: Yellowish discharge  Musculoskeletal:         General: Normal range of motion.      Cervical back: Normal range of motion.   Skin:     General: Skin is warm and dry.   Neurological:      General: No focal deficit present.      Mental Status: She is alert and oriented to person, place, and time.         ED Course & MDM   Diagnoses as of 01/18/24 1731   Anusitis   Vertigo       Medical Decision Making  63-year-old female presents to the ER with chief complaint of dizzy not feeling well patient reports that this morning while in bed she turned her head and got instant sensation of the room is spinning.  Went back to bed for couple hours still had the sensation when she got up.  At this present time the sensation is gone.  Patient is concerned that she might have an abscess in her anus patient has significant history back in May 2021 that she had a abscess with required surgery x 2 she had 2 emergent surgeries to treat the abscess.  So the patient is very sensitive to this.  Patient came to ED for evaluation.  Patient reports that she has been having some yellow discharge from her anus.  Patient workup is fairly negative however the CAT scan did show some inflammation in her anus due to her history will be very aggressive and admit her to the hospital for IV antibiotics.  I do not believe that she needs a surgical consult at this time I think the IV antibiotics and possible infectious disease consult and patient should be safe to discharge tomorrow but again that is the inpatient decision.    EKG showing normal sinus rhythm at a ventricular rate of 71 no ST elevation or depression this was interpreted by me.    Repeat EKG showing normal sinus  rhythm at a ventricular rate of 78 no ST elevation or depression to PVCs.  Interpreted by me.        Procedure  Procedures     Carlos [facility]ongR[facility], DO  01/18/24 3874

## 2024-01-18 NOTE — PROGRESS NOTES
"Vancomycin Dosing by Pharmacy- INITIAL    Reanna Pineda is a 63 y.o. year old female who Pharmacy has been consulted for vancomycin dosing for other abdominal infection . Based on the patient's indication and renal status this patient will be dosed based on a goal AUC of 400-600. Opting to target higher end of AUC goal due to possibility of sepsis.    Renal function is currently stable.    Visit Vitals  /75   Pulse 85   Temp 36.7 °C (98.1 °F) (Oral)   Resp 18        Lab Results   Component Value Date    CREATININE 0.72 01/18/2024    CREATININE 0.79 12/27/2023    CREATININE 0.74 02/27/2023    CREATININE 0.8 11/30/2021    CREATININE 0.7 07/26/2021    CREATININE 0.8 06/16/2021        Patient weight is No results found for: \"PTWEIGHT\"    No results found for: \"CULTURE\"     No intake/output data recorded.  [unfilled]    No results found for: \"PATIENTTEMP\"       Assessment/Plan     Patient will not be given a loading dose.  Will initiate vancomycin maintenance,  1250 mg every 12 hours.     This dosing regimen is predicted by [employer] to result in the following pharmacokinetic parameters:    Regimen: 1250 mg IV every 12 hours.  Start time: 18:43 on 01/18/2024  Exposure target: AUC24 (range)400-600 mg/L.hr   AUC24,ss: 554 mg/L.hr  Probability of AUC24 > 400: 82 %  Ctrough,ss: 17.6 mg/L  Probability of Ctrough,ss > 20: 39 %  Probability of nephrotoxicity (Lodise RYAN 2009): 13 %    Follow-up level will be ordered on 1/20 at 0500 unless clinically indicated sooner.  Will continue to monitor renal function daily while on vancomycin and order serum creatinine at least every 48 hours if not already ordered.  Follow for continued vancomycin needs, clinical response, and signs/symptoms of toxicity.       Krupa Duque, PharmD       "

## 2024-01-19 ENCOUNTER — APPOINTMENT (OUTPATIENT)
Dept: RADIOLOGY | Facility: HOSPITAL | Age: 64
End: 2024-01-19
Payer: COMMERCIAL

## 2024-01-19 VITALS
OXYGEN SATURATION: 95 % | DIASTOLIC BLOOD PRESSURE: 79 MMHG | HEART RATE: 99 BPM | TEMPERATURE: 96.6 F | WEIGHT: 205 LBS | SYSTOLIC BLOOD PRESSURE: 105 MMHG | HEIGHT: 67 IN | RESPIRATION RATE: 18 BRPM | BODY MASS INDEX: 32.18 KG/M2

## 2024-01-19 LAB
ANION GAP SERPL CALC-SCNC: 13 MMOL/L (ref 10–20)
ATRIAL RATE: 71 BPM
ATRIAL RATE: 78 BPM
BUN SERPL-MCNC: 12 MG/DL (ref 6–23)
CALCIUM SERPL-MCNC: 9.3 MG/DL (ref 8.6–10.3)
CHLORIDE SERPL-SCNC: 104 MMOL/L (ref 98–107)
CO2 SERPL-SCNC: 27 MMOL/L (ref 21–32)
CREAT SERPL-MCNC: 0.76 MG/DL (ref 0.5–1.05)
EGFRCR SERPLBLD CKD-EPI 2021: 88 ML/MIN/1.73M*2
ERYTHROCYTE [DISTWIDTH] IN BLOOD BY AUTOMATED COUNT: 13.1 % (ref 11.5–14.5)
EST. AVERAGE GLUCOSE BLD GHB EST-MCNC: 123 MG/DL
GLUCOSE BLD MANUAL STRIP-MCNC: 108 MG/DL (ref 74–99)
GLUCOSE BLD MANUAL STRIP-MCNC: 125 MG/DL (ref 74–99)
GLUCOSE SERPL-MCNC: 97 MG/DL (ref 74–99)
HBA1C MFR BLD: 5.9 %
HCT VFR BLD AUTO: 46.6 % (ref 36–46)
HGB BLD-MCNC: 14.9 G/DL (ref 12–16)
HOLD SPECIMEN: NORMAL
MCH RBC QN AUTO: 29.8 PG (ref 26–34)
MCHC RBC AUTO-ENTMCNC: 32 G/DL (ref 32–36)
MCV RBC AUTO: 93 FL (ref 80–100)
NRBC BLD-RTO: 0 /100 WBCS (ref 0–0)
P AXIS: 34 DEGREES
P AXIS: 37 DEGREES
P OFFSET: 177 MS
P OFFSET: 180 MS
P ONSET: 123 MS
P ONSET: 124 MS
PLATELET # BLD AUTO: 261 X10*3/UL (ref 150–450)
POTASSIUM SERPL-SCNC: 3.7 MMOL/L (ref 3.5–5.3)
PR INTERVAL: 174 MS
PR INTERVAL: 176 MS
Q ONSET: 211 MS
Q ONSET: 211 MS
QRS COUNT: 11 BEATS
QRS COUNT: 13 BEATS
QRS DURATION: 100 MS
QRS DURATION: 102 MS
QT INTERVAL: 388 MS
QT INTERVAL: 426 MS
QTC CALCULATION(BAZETT): 442 MS
QTC CALCULATION(BAZETT): 462 MS
QTC FREDERICIA: 423 MS
QTC FREDERICIA: 450 MS
R AXIS: -19 DEGREES
R AXIS: -25 DEGREES
RBC # BLD AUTO: 5 X10*6/UL (ref 4–5.2)
SODIUM SERPL-SCNC: 140 MMOL/L (ref 136–145)
T AXIS: 30 DEGREES
T AXIS: 38 DEGREES
T OFFSET: 405 MS
T OFFSET: 424 MS
VENTRICULAR RATE: 71 BPM
VENTRICULAR RATE: 78 BPM
WBC # BLD AUTO: 7.6 X10*3/UL (ref 4.4–11.3)

## 2024-01-19 PROCEDURE — 90686 IIV4 VACC NO PRSV 0.5 ML IM: CPT | Performed by: INTERNAL MEDICINE

## 2024-01-19 PROCEDURE — 2500000004 HC RX 250 GENERAL PHARMACY W/ HCPCS (ALT 636 FOR OP/ED)

## 2024-01-19 PROCEDURE — 2500000004 HC RX 250 GENERAL PHARMACY W/ HCPCS (ALT 636 FOR OP/ED): Performed by: INTERNAL MEDICINE

## 2024-01-19 PROCEDURE — 80048 BASIC METABOLIC PNL TOTAL CA: CPT | Performed by: NURSE PRACTITIONER

## 2024-01-19 PROCEDURE — 70544 MR ANGIOGRAPHY HEAD W/O DYE: CPT | Performed by: RADIOLOGY

## 2024-01-19 PROCEDURE — G0378 HOSPITAL OBSERVATION PER HR: HCPCS

## 2024-01-19 PROCEDURE — 70547 MR ANGIOGRAPHY NECK W/O DYE: CPT

## 2024-01-19 PROCEDURE — 96366 THER/PROPH/DIAG IV INF ADDON: CPT

## 2024-01-19 PROCEDURE — 2500000004 HC RX 250 GENERAL PHARMACY W/ HCPCS (ALT 636 FOR OP/ED): Performed by: NURSE PRACTITIONER

## 2024-01-19 PROCEDURE — 2500000001 HC RX 250 WO HCPCS SELF ADMINISTERED DRUGS (ALT 637 FOR MEDICARE OP): Performed by: NURSE PRACTITIONER

## 2024-01-19 PROCEDURE — 90472 IMMUNIZATION ADMIN EACH ADD: CPT | Performed by: NURSE PRACTITIONER

## 2024-01-19 PROCEDURE — 99222 1ST HOSP IP/OBS MODERATE 55: CPT | Performed by: NURSE PRACTITIONER

## 2024-01-19 PROCEDURE — 70551 MRI BRAIN STEM W/O DYE: CPT

## 2024-01-19 PROCEDURE — 90471 IMMUNIZATION ADMIN: CPT | Performed by: INTERNAL MEDICINE

## 2024-01-19 PROCEDURE — 70551 MRI BRAIN STEM W/O DYE: CPT | Performed by: RADIOLOGY

## 2024-01-19 PROCEDURE — 97165 OT EVAL LOW COMPLEX 30 MIN: CPT | Mod: GO | Performed by: OCCUPATIONAL THERAPIST

## 2024-01-19 PROCEDURE — 85027 COMPLETE CBC AUTOMATED: CPT | Performed by: NURSE PRACTITIONER

## 2024-01-19 PROCEDURE — 82947 ASSAY GLUCOSE BLOOD QUANT: CPT | Mod: 59

## 2024-01-19 PROCEDURE — 36415 COLL VENOUS BLD VENIPUNCTURE: CPT | Performed by: NURSE PRACTITIONER

## 2024-01-19 PROCEDURE — 90732 PPSV23 VACC 2 YRS+ SUBQ/IM: CPT | Performed by: NURSE PRACTITIONER

## 2024-01-19 PROCEDURE — 70547 MR ANGIOGRAPHY NECK W/O DYE: CPT | Performed by: RADIOLOGY

## 2024-01-19 PROCEDURE — 70544 MR ANGIOGRAPHY HEAD W/O DYE: CPT | Mod: 59

## 2024-01-19 RX ORDER — AMOXICILLIN AND CLAVULANATE POTASSIUM 875; 125 MG/1; MG/1
1 TABLET, FILM COATED ORAL 2 TIMES DAILY
Qty: 28 TABLET | Refills: 0 | Status: SHIPPED | OUTPATIENT
Start: 2024-01-19 | End: 2024-02-02

## 2024-01-19 RX ORDER — SODIUM CHLORIDE 9 MG/ML
INJECTION, SOLUTION INTRAVENOUS
Status: COMPLETED
Start: 2024-01-19 | End: 2024-01-19

## 2024-01-19 RX ORDER — VANCOMYCIN 1.75 G/350ML
1250 INJECTION, SOLUTION INTRAVENOUS EVERY 12 HOURS
Status: DISCONTINUED | OUTPATIENT
Start: 2024-01-19 | End: 2024-01-19 | Stop reason: HOSPADM

## 2024-01-19 RX ADMIN — PIPERACILLIN SODIUM AND TAZOBACTAM SODIUM 4.5 G: 4; .5 INJECTION, SOLUTION INTRAVENOUS at 05:43

## 2024-01-19 RX ADMIN — POLYETHYLENE GLYCOL 3350 17 G: 17 POWDER, FOR SOLUTION ORAL at 10:00

## 2024-01-19 RX ADMIN — Medication 2000 UNITS: at 10:00

## 2024-01-19 RX ADMIN — ASPIRIN 81 MG: 81 TABLET, COATED ORAL at 10:01

## 2024-01-19 RX ADMIN — PNEUMOCOCCAL VACCINE POLYVALENT 0.5 ML
25; 25; 25; 25; 25; 25; 25; 25; 25; 25; 25; 25; 25; 25; 25; 25; 25; 25; 25; 25; 25; 25; 25 INJECTION, SOLUTION INTRAMUSCULAR; SUBCUTANEOUS at 14:56

## 2024-01-19 RX ADMIN — PANTOPRAZOLE SODIUM 40 MG: 40 TABLET, DELAYED RELEASE ORAL at 06:38

## 2024-01-19 RX ADMIN — VANCOMYCIN 1250 MG: 1.75 INJECTION, SOLUTION INTRAVENOUS at 10:00

## 2024-01-19 RX ADMIN — VITAM B12 100 MCG: 100 TAB at 10:01

## 2024-01-19 RX ADMIN — INFLUENZA VIRUS VACCINE 0.5 ML: 15; 15; 15; 15 SUSPENSION INTRAMUSCULAR at 14:54

## 2024-01-19 RX ADMIN — OXYCODONE HYDROCHLORIDE AND ACETAMINOPHEN 500 MG: 500 TABLET ORAL at 10:01

## 2024-01-19 RX ADMIN — PIPERACILLIN SODIUM AND TAZOBACTAM SODIUM 4.5 G: 4; .5 INJECTION, SOLUTION INTRAVENOUS at 11:58

## 2024-01-19 RX ADMIN — SODIUM CHLORIDE 250 ML: 9 INJECTION, SOLUTION INTRAVENOUS at 05:42

## 2024-01-19 RX ADMIN — Medication 400 UNITS: at 10:01

## 2024-01-19 ASSESSMENT — COGNITIVE AND FUNCTIONAL STATUS - GENERAL
DAILY ACTIVITIY SCORE: 24
MOBILITY SCORE: 24
DAILY ACTIVITIY SCORE: 24

## 2024-01-19 ASSESSMENT — PAIN - FUNCTIONAL ASSESSMENT
PAIN_FUNCTIONAL_ASSESSMENT: 0-10

## 2024-01-19 ASSESSMENT — PAIN SCALES - GENERAL
PAINLEVEL_OUTOF10: 0 - NO PAIN

## 2024-01-19 ASSESSMENT — ACTIVITIES OF DAILY LIVING (ADL)
ADL_ASSISTANCE: INDEPENDENT
BATHING_ASSISTANCE: INDEPENDENT

## 2024-01-19 NOTE — PROGRESS NOTES
Physical Therapy                 Therapy Communication Note    Patient Name: Reanna Pineda  MRN: [id number]  Today's Date: 1/19/2024     Discipline: Physical Therapy    Missed Visit Reason: Missed Visit Reason:  (PT screen. No futher acute PT needs)    PT order received; chart reviewed. Pt. Demonstrated IND with transfers and amb. Good balance. No strength or sensation deficits noted. States her dizziness has resolved just feels slightly lightheaded. No issues with looking R/L when amb. Pt. May benefit from vestibular rehab if this persists.   Comment: [phone]

## 2024-01-19 NOTE — DISCHARGE SUMMARY
Discharge Diagnosis  Anusitis    Issues Requiring Follow-Up  Has history of rectal abscess with rupture and now presents with anusitis. Needs to see Dr. Mcintosh in follow up    Discharge Meds     Your medication list        START taking these medications        Instructions Last Dose Given Next Dose Due   amoxicillin-pot clavulanate 875-125 mg tablet  Commonly known as: Augmentin      Take 1 tablet (875 mg) by mouth 2 times a day for 14 days.              CONTINUE taking these medications        Instructions Last Dose Given Next Dose Due   ascorbic acid 500 mg tablet  Commonly known as: Vitamin C           aspirin 81 mg EC tablet           cholecalciferol 25 MCG (1000 UT) tablet  Commonly known as: Vitamin D-3           cyanocobalamin 1,000 mcg tablet  Commonly known as: Vitamin B-12           garlic 1,000 mg capsule           ibuprofen 800 mg tablet           simvastatin 20 mg tablet  Commonly known as: Zocor      Take 1 tablet (20 mg) by mouth once daily at bedtime.       vitamin E 180 mg (400 unit) capsule                     Where to Get Your Medications        These medications were sent to [id number] - [place], [place] - [address]  [address] [address]      Phone: [phone]   amoxicillin-pot clavulanate 875-125 mg tablet         Test Results Pending At Discharge  Pending Labs       Order Current Status    Blood Culture Preliminary result    Blood Culture Preliminary result            Hospital Course   Patient presented with complaints yellow discharge from rectum and dizziness. Pt has a history of vertigo and of rectal abscess with rupture, requiring surgery x 2. CT w/ contrast: Mild wall thickening and hazy infiltrative changes at the level of the anus suggestive of anusitis.  No perianal abscess.  Pt stayed overnight and received Zosyn. Dr. Colmenares consulted and recommended augmentin x 14 days and follow up with the previous surgeon, Dr. Mcintosh.    Pertinent Physical  Exam At Time of Discharge  Physical Exam  Constitutional:       Appearance: Normal appearance.   HENT:      Head: Normocephalic and atraumatic.      Mouth/Throat:      Mouth: Mucous membranes are moist.   Eyes:      Extraocular Movements: Extraocular movements intact.   Cardiovascular:      Rate and Rhythm: Normal rate and regular rhythm.      Pulses: Normal pulses.      Heart sounds: Normal heart sounds.   Pulmonary:      Effort: Pulmonary effort is normal.      Breath sounds: Normal breath sounds.   Abdominal:      General: Bowel sounds are normal.   Musculoskeletal:         General: Normal range of motion.      Cervical back: Normal range of motion.   Skin:     General: Skin is warm and dry.      Capillary Refill: Capillary refill takes less than 2 seconds.   Neurological:      General: No focal deficit present.      Mental Status: She is alert and oriented to person, place, and time.   Psychiatric:         Mood and Affect: Mood normal.         Behavior: Behavior normal.         Outpatient Follow-Up  Future Appointments   Date Time Provider Department Center   1/22/2024  4:15 PM Ruth Mcintosh MD [id number] [facility]   2/26/2024 11:00 AM Randy Davis MD [id number] [facility]         Heather Mari APRN-CNP

## 2024-01-19 NOTE — CARE PLAN
The patient's goals for the shift include  to go home     The clinical goals for the shift include patient will tolerate IV antibiotics this shift    Over the shift, the patient did make progress toward the following goals. Patient is ready for discharge per provider

## 2024-01-19 NOTE — H&P
History and Physical         Reanna Pineda 63 y.o. 1960     History Of Present Illness  Reanna Pineda is a 63 y.o. female presented to [facility] ED from  home. Patient  reported in 2021 she was hospitalized for Sepsis after a rectal abscess ruptured.  States about 5 days go  she noted  she  was having  yellow drainage after bowel movements. he was concerned that she may be headed toward sepsis againShe called Dr. Davis,  PCP  who advised she go  to the ED. In  the  ED Ct of Abdomen  and Pelvis showed Mild wall thickening and hazy infiltrative changes at the level of the anus suggestive of anusitis.  Zosyn IV started in ED.  Vanco  Ordered.  In  the ED patient c/o acute  impaired ambulation d/t acute dizziness. Patient reported a near syncopal event stating  she was so very dizzy and went to lie down as she felt she was going to pass out. Patient denies LOC.  Ordered MRI Brain MRA head/neck.  On exam patient resting in bed.  Alert x 4 Patient denies dizziness,  headache,   N/V/D pain rectal  pain CP SOB.   Past Medical History  No past medical history on file.     Surgical History  She has no past surgical history on file.     Social History  Social History     Socioeconomic History    Marital status:      Spouse name: Not on file    Number of children: Not on file    Years of education: Not on file    Highest education level: Not on file   Occupational History    Not on file   Tobacco Use    Smoking status: Never    Smokeless tobacco: Never   Substance and Sexual Activity    Alcohol use: Yes     Alcohol/week: 24.0 standard drinks of alcohol     Types: 24 Standard drinks or equivalent per week    Drug use: Never    Sexual activity: Not on file   Other Topics Concern    Not on file   Social History Narrative    Not on file     Social Determinants of Health     Financial Resource Strain: Low Risk  (1/18/2024)    Overall Financial Resource Strain (CARDIA)     Difficulty of Paying  Living Expenses: Not hard at all   Food Insecurity: Not on file   Transportation Needs: No Transportation Needs (1/18/2024)    PRAPARE - Transportation     Lack of Transportation (Medical): No     Lack of Transportation (Non-Medical): No   Physical Activity: Not on file   Stress: Not on file   Social Connections: Not on file   Intimate Partner Violence: Not on file   Housing Stability: Low Risk  (1/18/2024)    Housing Stability Vital Sign     Unable to Pay for Housing in the Last Year: No     Number of Places Lived in the Last Year: 1     Unstable Housing in the Last Year: No        Family History  Family History   Problem Relation Name Age of Onset    Other (cardiac problems) Mother      Diabetes Mother      Hypertension Mother      Cancer Mother      Other (cardiac problems) Father      Diabetes Father      Hypertension Father      Cancer Father          Allergies  Allergies   Allergen Reactions    Penicillins Unknown    Percocet [Oxycodone-Acetaminophen] Unknown        Vital Signs  Temp:  [36.5 °C (97.7 °F)-36.8 °C (98.2 °F)] 36.8 °C (98.2 °F)  Heart Rate:  [65-93] 65  Resp:  [16-18] 16  BP: ()/(55-91) 96/55    Home Medications   Prior to Admission Medications   Prescriptions Last Dose Informant Patient Reported? Taking?   ascorbic acid (Vitamin C) 500 mg tablet 1/18/2024  Yes No   Sig: Take 1 tablet (500 mg) by mouth once daily.   aspirin 81 mg EC tablet 1/17/2024  Yes No   Sig: Take 1 tablet (81 mg) by mouth once daily.   cholecalciferol (Vitamin D-3) 25 MCG (1000 UT) tablet 1/18/2024  Yes No   Sig: Take 1 tablet (25 mcg) by mouth once daily.   cyanocobalamin (Vitamin B-12) 1,000 mcg tablet 1/18/2024  Yes No   Sig: Take 100 mcg by mouth once daily.   garlic 1,000 mg capsule 1/18/2024  Yes No   Sig: Take by mouth.   ibuprofen 800 mg tablet Unknown  Yes No   Sig: Take 1 tablet (800 mg) by mouth 2 times a day after meals.   simvastatin (Zocor) 20 mg tablet 1/18/2024  No No   Sig: Take 1 tablet (20 mg) by  mouth once daily at bedtime.   vitamin E 180 mg (400 unit) capsule 1/18/2024  Yes No   Sig: Take 1 capsule (400 Units) by mouth once daily.      Facility-Administered Medications: None       New Hospital Orders  Current Facility-Administered Medications   Medication Dose Route Frequency Provider Last Rate Last Admin    acetaminophen (Tylenol) tablet 650 mg  650 mg oral q4h PRN YAW Taylor        Or    acetaminophen (Tylenol) oral liquid 650 mg  650 mg nasogastric tube q4h PRN YAW Taylor        Or    acetaminophen (Tylenol) suppository 650 mg  650 mg rectal q4h PRN YAW Taylor        acetaminophen (Tylenol) tablet 650 mg  650 mg oral q4h PRN YAW Taylor        Or    acetaminophen (Tylenol) oral liquid 650 mg  650 mg oral q4h PRN YAW Taylor        Or    acetaminophen (Tylenol) suppository 650 mg  650 mg rectal q4h PRN YAW Taylor        ascorbic acid (Vitamin C) tablet 500 mg  500 mg oral Daily YAW Connelly        aspirin EC tablet 81 mg  81 mg oral Daily YAW Connelly        cholecalciferol (Vitamin D-3) tablet 2,000 Units  2,000 Units oral Daily YAW Connelly        cyanocobalamin (Vitamin B-12) tablet 100 mcg  100 mcg oral Daily YAW Connelly        enoxaparin (Lovenox) syringe 40 mg  40 mg subcutaneous q24h YAW Taylor   40 mg at 01/18/24 2117    flu vaccine (IIV4) age 6 months and greater, preservative free  0.5 mL intramuscular During hospitalization Keena Broewr MD        hydrALAZINE (Apresoline) injection 10 mg  10 mg intravenous q20 min PRN YAW Taylor        Followed by    [START ON 1/20/2024] hydrALAZINE (Apresoline) tablet 25 mg  25 mg oral q6h PRN YAW Taylor        labetaloL (Normodyne,Trandate) injection 10 mg  10 mg intravenous q10 min PRN Joyce L Rudler, APRN-CNP        ondansetron (Zofran) tablet 4 mg  4 mg  oral q8h PRN YAW Taylor        Or    ondansetron (Zofran) injection 4 mg  4 mg intravenous q8h PRN YAW Taylor        oxygen (O2) therapy   inhalation Continuous PRN - O2/gases YAW Taylor        pantoprazole (ProtoNix) EC tablet 40 mg  40 mg oral Daily before breakfast YAW Taylor        Or    pantoprazole (ProtoNix) injection 40 mg  40 mg intravenous Daily before breakfast YAW Taylor        piperacillin-tazobactam-dextrose (Zosyn) IV 4.5 g  4.5 g intravenous q6h YAW Taylor   Stopped at 01/19/24 0029    polyethylene glycol (Glycolax, Miralax) packet 17 g  17 g oral Daily PRN YAW Taylor        simvastatin (Zocor) tablet 20 mg  20 mg oral Nightly YAW Connelly   20 mg at 01/18/24 2116    sodium chloride 0.9% infusion  - Omnicell Override Pull             sodium chloride 0.9% infusion  - Omnicell Override Pull             sodium chloride 0.9% infusion  - Omnicell Override Pull             vancomycin 1.25 g in dextrose 5 % 250 mL IV  1,250 mg intravenous q12h YAW Taylor   Stopped at 01/18/24 2200    vancomycin vial for injection  - Omnicell Override Pull             vancomycin vial for injection  - Omnicell Override Pull             vitamin E capsule 400 Units  400 Units oral Daily YAW Connelly               Review of Systems   All other systems reviewed and are negative.          Physical Exam  Constitutional:       Appearance: She is obese.   HENT:      Head: Normocephalic and atraumatic.      Nose: Nose normal.      Mouth/Throat:      Mouth: Mucous membranes are moist.   Eyes:      Extraocular Movements: Extraocular movements intact.      Pupils: Pupils are equal, round, and reactive to light.   Cardiovascular:      Rate and Rhythm: Normal rate and regular rhythm.      Pulses: Normal pulses.      Heart sounds: Normal heart sounds.   Pulmonary:      Effort:  "Pulmonary effort is normal.   Abdominal:      General: Abdomen is flat. Bowel sounds are normal.      Palpations: Abdomen is soft.   Genitourinary:     Comments: Deferred Exam   Musculoskeletal:         General: Normal range of motion.      Cervical back: Normal range of motion and neck supple.   Skin:     General: Skin is warm.   Neurological:      General: No focal deficit present.      Mental Status: She is alert and oriented to person, place, and time.   Psychiatric:         Mood and Affect: Mood normal.         Behavior: Behavior normal.            Last Recorded Vitals  Blood pressure 96/55, pulse 65, temperature 36.8 °C (98.2 °F), temperature source Temporal, resp. rate 16, height 1.702 m (5' 7\"), weight 93 kg (205 lb), SpO2 92 %.    Relevant Results  Results for orders placed or performed during the hospital encounter of 01/18/24   Blood Culture    Specimen: Peripheral Venipuncture; Blood culture   Result Value Ref Range    Blood Culture Loaded on Instrument - Culture in progress    Blood Culture    Specimen: Peripheral Venipuncture; Blood culture   Result Value Ref Range    Blood Culture Loaded on Instrument - Culture in progress    CBC and Auto Differential   Result Value Ref Range    WBC 7.0 4.4 - 11.3 x10*3/uL    nRBC 0.0 0.0 - 0.0 /100 WBCs    RBC 5.03 4.00 - 5.20 x10*6/uL    Hemoglobin 15.2 12.0 - 16.0 g/dL    Hematocrit 46.8 (H) 36.0 - 46.0 %    MCV 93 80 - 100 fL    MCH 30.2 26.0 - 34.0 pg    MCHC 32.5 32.0 - 36.0 g/dL    RDW 13.2 11.5 - 14.5 %    Platelets 257 150 - 450 x10*3/uL    Neutrophils % 68.7 40.0 - 80.0 %    Immature Granulocytes %, Automated 0.1 0.0 - 0.9 %    Lymphocytes % 23.6 13.0 - 44.0 %    Monocytes % 6.6 2.0 - 10.0 %    Eosinophils % 0.7 0.0 - 6.0 %    Basophils % 0.3 0.0 - 2.0 %    Neutrophils Absolute 4.81 1.20 - 7.70 x10*3/uL    Immature Granulocytes Absolute, Automated 0.01 0.00 - 0.70 x10*3/uL    Lymphocytes Absolute 1.65 1.20 - 4.80 x10*3/uL    Monocytes Absolute 0.46 0.10 - " 1.00 x10*3/uL    Eosinophils Absolute 0.05 0.00 - 0.70 x10*3/uL    Basophils Absolute 0.02 0.00 - 0.10 x10*3/uL   Comprehensive Metabolic Panel   Result Value Ref Range    Glucose 105 (H) 74 - 99 mg/dL    Sodium 141 136 - 145 mmol/L    Potassium 4.7 3.5 - 5.3 mmol/L    Chloride 103 98 - 107 mmol/L    Bicarbonate 30 21 - 32 mmol/L    Anion Gap 13 10 - 20 mmol/L    Urea Nitrogen 14 6 - 23 mg/dL    Creatinine 0.72 0.50 - 1.05 mg/dL    eGFR >90 >60 mL/min/1.73m*2    Calcium 9.7 8.6 - 10.3 mg/dL    Albumin 4.3 3.4 - 5.0 g/dL    Alkaline Phosphatase 87 33 - 136 U/L    Total Protein 7.1 6.4 - 8.2 g/dL    AST 26 9 - 39 U/L    Bilirubin, Total 0.9 0.0 - 1.2 mg/dL    ALT 40 7 - 45 U/L   Lactate   Result Value Ref Range    Lactate 1.1 0.4 - 2.0 mmol/L   Urinalysis with Reflex Culture and Microscopic   Result Value Ref Range    Color, Urine Yellow Straw, Yellow    Appearance, Urine Clear Clear    Specific Gravity, Urine 1.042 (N) 1.005 - 1.035    pH, Urine 6.0 5.0, 5.5, 6.0, 6.5, 7.0, 7.5, 8.0    Protein, Urine NEGATIVE NEGATIVE mg/dL    Glucose, Urine NEGATIVE NEGATIVE mg/dL    Blood, Urine NEGATIVE NEGATIVE    Ketones, Urine 20 (1+) (A) NEGATIVE mg/dL    Bilirubin, Urine NEGATIVE NEGATIVE    Urobilinogen, Urine <2.0 <2.0 mg/dL    Nitrite, Urine NEGATIVE NEGATIVE    Leukocyte Esterase, Urine NEGATIVE NEGATIVE   Troponin I, High Sensitivity, Initial   Result Value Ref Range    Troponin I, High Sensitivity <3 0 - 13 ng/L   Troponin, High Sensitivity, 1 Hour   Result Value Ref Range    Troponin I, High Sensitivity <3 0 - 13 ng/L   Lipid Panel   Result Value Ref Range    Cholesterol 216 (H) 0 - 199 mg/dL    HDL-Cholesterol 66.7 mg/dL    Cholesterol/HDL Ratio 3.2     LDL Calculated 135 (H) <=99 mg/dL    VLDL 14 0 - 40 mg/dL    Triglycerides 72 0 - 149 mg/dL    Non HDL Cholesterol 149 0 - 149 mg/dL   B-Type Natriuretic Peptide   Result Value Ref Range    BNP 22 0 - 99 pg/mL   ECG 12 lead   Result Value Ref Range    Ventricular Rate  71 BPM    Atrial Rate 71 BPM    AK Interval 176 ms    QRS Duration 102 ms    QT Interval 426 ms    QTC Calculation(Bazett) 462 ms    P Axis 34 degrees    R Axis -19 degrees    T Axis 38 degrees    QRS Count 11 beats    Q Onset 211 ms    P Onset 123 ms    P Offset 180 ms    T Offset 424 ms    QTC Fredericia 450 ms   ECG 12 lead   Result Value Ref Range    Ventricular Rate 78 BPM    Atrial Rate 78 BPM    AK Interval 174 ms    QRS Duration 100 ms    QT Interval 388 ms    QTC Calculation(Bazett) 442 ms    P Axis 37 degrees    R Axis -25 degrees    T Axis 30 degrees    QRS Count 13 beats    Q Onset 211 ms    P Onset 124 ms    P Offset 177 ms    T Offset 405 ms    QTC Fredericia 423 ms        Imaging   ECG 12 lead    Result Date: 1/18/2024  Sinus rhythm with occasional Premature ventricular complexes Anterolateral infarct , age undetermined Abnormal ECG No previous ECGs available    ECG 12 lead    Result Date: 1/18/2024  Normal sinus rhythm Possible Anterior infarct (cited on or before 18-JAN-2024) Abnormal ECG When compared with ECG of 18-JAN-2024 13:17, (unconfirmed) Premature ventricular complexes are no longer Present Questionable change in initial forces of Lateral leads    CT abdomen pelvis w IV contrast    Result Date: 1/18/2024  STUDY: CT Abdomen and Pelvis with IV Contrast; 1/18/2024 2:57 PM INDICATION: Rectal pain. COMPARISON: 11/30/2021 CT abdomen and pelvis. ACCESSION NUMBER(S): [id number] ORDERING CLINICIAN: DIANA MURILLO TECHNIQUE: CT of the abdomen and pelvis was performed.  Contiguous axial images were obtained at 3 mm slice thickness through the abdomen and pelvis. Coronal and sagittal reconstructions at 3 mm slice thickness were performed.  Omnipaque 350 75 mL was administered intravenously.  FINDINGS: LOWER CHEST: No cardiomegaly.  No pericardial effusion.  Lung bases are clear.  ABDOMEN:  LIVER: No hepatomegaly.  Smooth surface contour.  Low attenuation  BILE DUCTS: No intrahepatic or extrahepatic  biliary ductal dilatation.  GALLBLADDER: The gallbladder is absent. STOMACH: No abnormalities identified.  PANCREAS: No masses or ductal dilatation.  SPLEEN: No splenomegaly or focal splenic lesion.  ADRENAL GLANDS: No thickening or nodules.  KIDNEYS AND URETERS: Kidneys are normal in size and location.  No renal or ureteral calculi.  2 cm hypodense cysts posterior aspect of the mid left kidney  PELVIS:  BLADDER: No abnormalities identified.  REPRODUCTIVE ORGANS: No abnormalities identified.  BOWEL: Moderate amount of stool within the rectum.  Negative for bowel obstruction.  No CT findings of diverticulitis.  Normal appendix mild wall thickening and hazy infiltrative changes within the soft tissues at the level of the anus image 147 series 201.  No perianal abscess.  VESSELS: No abnormalities identified.  Abdominal aorta is normal in caliber.  PERITONEUM/RETROPERITONEUM/LYMPH NODES: No free fluid.  No pneumoperitoneum. No lymphadenopathy.  ABDOMINAL WALL: No abnormalities identified. SOFT TISSUES: No abnormalities identified.  BONES: No acute fracture or aggressive osseous lesion.    Mild wall thickening and hazy infiltrative changes at the level of the anus suggestive of anusitis.  No perianal abscess. Mild constipation. Normal appendix. Negative for diverticulitis. Hepatic steatosis. Signed by Barrett Jarquin MD    CT head wo IV contrast    Result Date: 1/18/2024  Interpreted By:  John Su, STUDY: CT HEAD WO IV CONTRAST; 1/18/2024 2:50 pm   INDICATION: Signs/Symptoms:Near syncope.   COMPARISON: None.   ACCESSION NUMBER(S): [id number]   ORDERING CLINICIAN: DIANA MURILLO   TECHNIQUE: Contiguous axial CT images were obtained through the head at 5 mm slice thickness without contrast administration.   FINDINGS: INTRACRANIAL: The ventricles, sulci and basal cisterns are within normal limits for size and configuration. The grey-white differentiation is intact. There is no mass effect or midline shift. There is no  extraaxial fluid collection. There is no intracranial hemorrhage.  The calvarium is unremarkable.   EXTRACRANIAL: Visualized paranasal sinuses and mastoids are clear.       No evidence of acute cortical infarct or intracranial hemorrhage.   MACRO: None     Signed by: John Su 1/18/2024 3:05 PM Dictation workstation:   [id number]          Assessment/Plan   Principal Problem:    Anusitis  63 y.o. female presented to [facility] ED from  home. Patient  reported in 2021 she was hospitalized for Sepsis after a rectal abscess ruptured.  States about 5 days go  she noted  she  was having  yellow drainage after bowel movements. She was concerned that she may be headed toward sepsis again She called Dr. Davis,  PCP  who advised she go  to the ED.    In the ED Ct of Abdomen  and Pelvis showed Mild wall thickening and hazy infiltrative changes at the level of the anus suggestive of anusitis.    Zosyn IV started in ED.  Vanco Ordered.    Continue Zosyn   Continue Vanco   Consult ID, appreciate recs     # Near Syncopal Event   At home Patient reported a near syncopal event stating  she was so very dizzy and went to lie down as she felt she was going to pass out. Patient denies LOC.   On Exam Patient denies dizziness,  headache,      Ordered MRI Brain MRA head/neck  Neuro Check Q 2 hours   Tele monitoring       Total accumulated time spent face to face and not face to face preparing to see the patient, obtaining and reviewing separately obtained history; performing a medically appropriate examination and/or evaluation; counseling and educating the patient, family; ordering medications, tests, or procedures; referring and communicating with other health care professionals; documenting clinical information in the patient's medical record; independently interpreting results and communicating the results to the patient, family; and care coordination was 40 minutes      Darrell Camp, SHEREE-CNP

## 2024-01-19 NOTE — DISCHARGE INSTR - OTHER ORDERS
Thank you for choosing [facility] for your Health Care needs. As you transition from the hospital back to home, we hope we took your preferences into account on how you manage your health needs so you can manage your health at home.     You may receive a survey in the mail within the next couple weeks. Please take the time to complete it and return it. Your input is ALWAYS important to us. Thank you!  Your Care Transition Team - Janice Recinos Amanda & David - [phone]    For questions about your medications listed on your discharge instructions, please call the Nurses Station at [phone].

## 2024-01-19 NOTE — PROGRESS NOTES
Patient evaluated at bedside. AAOX3. Lives with her friend, Rox,  in a 2 story single family home. There are 3 steps with bilateral wide railing to enter the residence. Once inside, there are 9 steps with single railing to her bedroom and 10 steps with single railing to basement where laundry room is located. Bathroom access both on the main floor and upstairs by the bedrooms. Patient denies any issues navigating stairs. DME: shower bench, grab bars. Independent in ADL's and iADL's. 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 Patient is able to drive. Patient denies any falls within the past 6 months. PCP: Randy Davis last seen 8/23/23. Pharmacy: [facility] in [place].  Patient self manages their home medications utilizing a weekly pill box without difficulty, and denies issues with affordability. Patient denies any need for further assistance after discharge home. They feel comfortable going home when medically ready. Plan for ID to determine ATB need. Patient is to follow up with her previous surgeon. She has already scheduled an appointment.     Discharge plan: Home no needs.  DC Plan Secure

## 2024-01-19 NOTE — PROGRESS NOTES
Occupational Therapy    Evaluation    Patient Name: Reanna Pineda  MRN: [id number]  Today's Date: 1/19/2024  Time Calculation  Start Time: 0743  Stop Time: 0759  Time Calculation (min): 16 min    Assessment  IP OT Assessment  OT Assessment: Pt. is a 63 y.o. female referred to  OT for self -care d/t admisison for anusitis. Pt. dispalys independence with ADL tasks nor did she dispaly neuro deficits. Pt. does not require OT at this time.  Prognosis: Good  Barriers to Discharge: None  Evaluation/Treatment Tolerance: Patient tolerated treatment well  Medical Staff Made Aware: Yes  End of Session Communication: Bedside nurse  End of Session Patient Position: Alarm off, not on at start of session (edge of bed)  Plan:  No Skilled OT: Independent with ADLs  OT Frequency: OT eval only  OT Discharge Recommendations: No further acute OT, No OT needed after discharge  OT Recommended Transfer Status: Independent  OT - OK to Discharge: Yes (Based on completed evaluation and care plan recommendations, no barriers to discharge to next site of care)    Subjective   Current Problem:  1. Anusitis        2. Vertigo          General:  General  Reason for Referral: impaired self-care d/t admission for anusitis  Referred By: YAW Sneed  Past Medical History Relevant to Rehab: sepsis; vertigo  Family/Caregiver Present: No  Prior to Session Communication: Bedside nurse  Patient Position Received: Bed, 2 rail up  Vital Signs:  Heart Rate: 71  Heart Rate Source: Monitor  SpO2: 92 %  Patient Position: Lying  Pain:  Pain Assessment  Pain Assessment: 0-10  Pain Score: 0 - No pain    Objective   Cognition:  Overall Cognitive Status: Within Functional Limits  Orientation Level: Oriented X4  Home Living:  Type of Home: House  Lives With: Friends  Home Adaptive Equipment: Cane, Walker rolling or standard (does not use)  Home Layout: Multi-level  Alternate Level Stairs-Rails: Right  Alternate Level Stairs-Number of Steps: 9 (3 landing  +9)  Home Access: Stairs to enter with rails  Entrance Stairs-Rails: Both (unable to hold both at the same time d/t distance)  Entrance Stairs-Number of Steps: 3  Bathroom Shower/Tub: Walk-in shower  Bathroom Toilet: Handicapped height  Bathroom Equipment: Built-in shower seat, Grab bars in shower, Grab bars around toilet   Prior Function:  Level of [place]: Independent with ADLs and functional transfers, Independent with homemaking with ambulation  ADL Assistance: Independent  Homemaking Assistance: Independent  Ambulatory Assistance: Independent  Hand Dominance: Right  IADL History:  Homemaking Responsibilities: Yes  Meal Prep Responsibility: Primary  Laundry Responsibility: Primary  Cleaning Responsibility: Primary  Bill Paying/Finance Responsibility: Primary  Shopping Responsibility: Primary  Current License: Yes  ADL:  Eating Assistance: Independent (anticipated)  Grooming Assistance: Independent (anticipated)  Bathing Assistance: Independent (anticipated)  UE Dressing Assistance: Independent (anticipated)  LE Dressing Assistance: Independent  Toileting Assistance with Device: Independent  Activity Tolerance:  Endurance: Endurance does not limit participation in activity  Bed Mobility/Transfers: Bed Mobility  Bed Mobility: Yes  Bed Mobility 1  Bed Mobility 1: Supine to sitting  Level of Assistance 1: Independent    Transfers  Transfer: Yes  Transfer 1  Transfer From 1: Bed to  Transfer to 1: Toilet  Technique 1: Sit to stand, Stand to sit  Transfer Level of Assistance 1: Independent  Ambulation/Gait Training:  Ambulation/Gait Training  Ambulation/Gait Training Performed: Yes  Ambulation/Gait Training 1  Surface 1: Level tile  Assistance 1: Independent  Sitting Balance:  Static Sitting Balance  Static Sitting-Balance Support: Feet supported  Static Sitting-Level of Assistance: Independent  Dynamic Sitting Balance  Dynamic Sitting-Balance Support: Feet supported  Dynamic Sitting-Balance: Forward  lean  Dynamic Sitting-Comments: independent  Standing Balance:  Static Standing Balance  Static Standing-Balance Support: No upper extremity supported  Static Standing-Level of Assistance: Independent  Dynamic Standing Balance  Dynamic Standing-Balance Support: No upper extremity supported  Dynamic Standing-Balance: Turning  Dynamic Standing-Comments: independent  IADL's:   Homemaking Responsibilities: Yes  Meal Prep Responsibility: Primary  Laundry Responsibility: Primary  Cleaning Responsibility: Primary  Bill Paying/Finance Responsibility: Primary  Shopping Responsibility: Primary  Current License: Yes  Vision: Vision - Basic Assessment  Current Vision: Wears glasses all the time  Sensation:  Sensation Comment: denies deficits  Strength:  Strength Comments: BUE: grossly 5/5  Coordination:  Movements are Fluid and Coordinated: Yes  Finger to Nose: Intact  Heel to Shin: Intact   Hand Function:  Hand Function  Gross Grasp: Functional  Coordination: Functional  Extremities: RUE   RUE : Within Functional Limits and LUE   LUE: Within Functional Limits    Outcome Measures: [facility] Daily Activity  Putting on and taking off regular lower body clothing: None  Bathing (including washing, rinsing, drying): None  Putting on and taking off regular upper body clothing: None  Toileting, which includes using toilet, bedpan or urinal: None  Taking care of personal grooming such as brushing teeth: None  Eating Meals: None  Daily Activity - Total Score: 24      Education Documentation  ADL Training, taught by Julissa Alexandre OT at 1/19/2024  8:58 AM.  Learner: Patient  Readiness: Acceptance  Method: Explanation  Response: Verbalizes Understanding  Comment: Edu on POC and DC rec. All questions answered.    Education Comments  No comments found.      Goals: Not established as pt. Is independent for ADL tasks.

## 2024-01-19 NOTE — NURSING NOTE
0720: Assumed care of patient.     0830: Patient off floor for MRI    0930: Patient returned to room    1000: Meds administered, assessment completed. Patient verbalizes desires for discharge, provider aware.     1354: Received new order for discharge    1456: Flu and Pneumonia vac given per patient request. IV, masimo and telemetry removed. Discharge instructions reviewed with patient.     1524: Patient discharged to home with no needs. Patient accompanied to private vehicle via wheelchair with one staff assist. Patient left with all belongings.

## 2024-01-22 ENCOUNTER — OFFICE VISIT (OUTPATIENT)
Dept: SURGERY | Facility: CLINIC | Age: 64
End: 2024-01-22
Payer: COMMERCIAL

## 2024-01-22 VITALS
HEART RATE: 86 BPM | HEIGHT: 67 IN | BODY MASS INDEX: 32.18 KG/M2 | RESPIRATION RATE: 17 BRPM | DIASTOLIC BLOOD PRESSURE: 86 MMHG | TEMPERATURE: 98.1 F | WEIGHT: 205 LBS | SYSTOLIC BLOOD PRESSURE: 128 MMHG

## 2024-01-22 DIAGNOSIS — K60.3 ANAL FISTULA: Primary | ICD-10-CM

## 2024-01-22 PROCEDURE — 1036F TOBACCO NON-USER: CPT | Performed by: SURGERY

## 2024-01-22 PROCEDURE — 99212 OFFICE O/P EST SF 10 MIN: CPT | Performed by: SURGERY

## 2024-01-22 RX ORDER — GABAPENTIN 300 MG/1
300 CAPSULE ORAL ONCE
Status: CANCELLED | OUTPATIENT
Start: 2024-01-22 | End: 2024-01-22

## 2024-01-22 RX ORDER — ACETAMINOPHEN 325 MG/1
975 TABLET ORAL ONCE
Status: CANCELLED | OUTPATIENT
Start: 2024-01-22 | End: 2024-01-22

## 2024-01-22 RX ORDER — SODIUM CHLORIDE, SODIUM LACTATE, POTASSIUM CHLORIDE, CALCIUM CHLORIDE 600; 310; 30; 20 MG/100ML; MG/100ML; MG/100ML; MG/100ML
100 INJECTION, SOLUTION INTRAVENOUS CONTINUOUS
Status: CANCELLED | OUTPATIENT
Start: 2024-01-22

## 2024-01-22 ASSESSMENT — ENCOUNTER SYMPTOMS
DEPRESSION: 0
HEMATOLOGIC/LYMPHATIC NEGATIVE: 1
LOSS OF SENSATION IN FEET: 0
CONSTITUTIONAL NEGATIVE: 1
MUSCULOSKELETAL NEGATIVE: 1
PSYCHIATRIC NEGATIVE: 1
OCCASIONAL FEELINGS OF UNSTEADINESS: 0
NEUROLOGICAL NEGATIVE: 1
RESPIRATORY NEGATIVE: 1
CARDIOVASCULAR NEGATIVE: 1
GASTROINTESTINAL NEGATIVE: 1

## 2024-01-22 ASSESSMENT — COLUMBIA-SUICIDE SEVERITY RATING SCALE - C-SSRS
1. IN THE PAST MONTH, HAVE YOU WISHED YOU WERE DEAD OR WISHED YOU COULD GO TO SLEEP AND NOT WAKE UP?: NO
6. HAVE YOU EVER DONE ANYTHING, STARTED TO DO ANYTHING, OR PREPARED TO DO ANYTHING TO END YOUR LIFE?: NO
2. HAVE YOU ACTUALLY HAD ANY THOUGHTS OF KILLING YOURSELF?: NO

## 2024-01-22 ASSESSMENT — PATIENT HEALTH QUESTIONNAIRE - PHQ9
SUM OF ALL RESPONSES TO PHQ9 QUESTIONS 1 & 2: 0
1. LITTLE INTEREST OR PLEASURE IN DOING THINGS: NOT AT ALL
2. FEELING DOWN, DEPRESSED OR HOPELESS: NOT AT ALL

## 2024-01-22 ASSESSMENT — PAIN SCALES - GENERAL: PAINLEVEL: 0-NO PAIN

## 2024-01-22 ASSESSMENT — LIFESTYLE VARIABLES
HOW OFTEN DO YOU HAVE SIX OR MORE DRINKS ON ONE OCCASION: WEEKLY
SKIP TO QUESTIONS 9-10: 0
HOW MANY STANDARD DRINKS CONTAINING ALCOHOL DO YOU HAVE ON A TYPICAL DAY: 3 OR 4
AUDIT-C TOTAL SCORE: 8
HOW OFTEN DO YOU HAVE A DRINK CONTAINING ALCOHOL: 4 OR MORE TIMES A WEEK

## 2024-01-22 NOTE — PROGRESS NOTES
General Surgery Service    History Of Present Illness    Reanna Pineda is a 63 y.o. female presenting with perianal pain. 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 The patient returns today after a recent bout of veronique-rectal pain and yellow drainage. She was seen at the ER and found to have inflammation of the perianal region on CT scan, no increased WBC.    She feels an intermittent discomfort in the sacral region     Past Medical History  She has a past medical history of Arthritis, Bronchitis, Heart disease, and High cholesterol.    Current Outpatient Medications on File Prior to Visit   Medication Sig Dispense Refill    amoxicillin-pot clavulanate (Augmentin) 875-125 mg tablet Take 1 tablet (875 mg) by mouth 2 times a day for 14 days. 28 tablet 0    ascorbic acid (Vitamin C) 500 mg tablet Take 1 tablet (500 mg) by mouth once daily.      aspirin 81 mg EC tablet Take 1 tablet (81 mg) by mouth once daily.      cholecalciferol (Vitamin D-3) 25 MCG (1000 UT) tablet Take 1 tablet (25 mcg) by mouth once daily.      cyanocobalamin (Vitamin B-12) 1,000 mcg tablet Take 100 mcg by mouth once daily.      garlic 1,000 mg capsule Take by mouth.      ibuprofen 800 mg tablet Take 1 tablet (800 mg) by mouth 2 times a day after meals.      simvastatin (Zocor) 20 mg tablet Take 1 tablet (20 mg) by mouth once daily at bedtime. 90 tablet 1    vitamin E 180 mg (400 unit) capsule Take 1 capsule (400 Units) by mouth once daily.       No current facility-administered medications on file prior to visit.         Surgical History  She has a past surgical history that includes Endometrial ablation; Cholecystectomy; and Abscess drainage (2021).     Social History  She reports that she has never smoked. She has never used smokeless tobacco. She reports current alcohol use of about 24.0 standard drinks of alcohol per week. She reports that she does not use  drugs.    Family History  Family History   Problem Relation Name Age of Onset    Other (cardiac problems) Mother      Diabetes Mother      Hypertension Mother      Cancer Mother      Other (cardiac problems) Father      Diabetes Father      Hypertension Father      Cancer Father          Allergies  Percocet [oxycodone-acetaminophen]    Review of Systems   Constitutional: Negative.    HENT: Negative.     Respiratory: Negative.     Cardiovascular: Negative.    Gastrointestinal: Negative.    Genitourinary: Negative.    Musculoskeletal: Negative.    Skin: Negative.    Neurological: Negative.    Hematological: Negative.    Psychiatric/Behavioral: Negative.          Physical Exam  Constitutional:       Appearance: Normal appearance.   HENT:      Head: Normocephalic.      Nose: Nose normal.      Mouth/Throat:      Mouth: Mucous membranes are dry.   Eyes:      Extraocular Movements: Extraocular movements intact.      Pupils: Pupils are equal, round, and reactive to light.   Cardiovascular:      Pulses: Normal pulses.   Pulmonary:      Effort: Pulmonary effort is normal.   Abdominal:      General: Abdomen is flat.      Palpations: Abdomen is soft.   Genitourinary:     Comments: Fistula noted in the posterior midline, no veronique-rectal abscess noted  Musculoskeletal:         General: Normal range of motion.      Cervical back: Normal range of motion.   Skin:     General: Skin is warm and dry.   Neurological:      Mental Status: She is alert and oriented to person, place, and time.   Psychiatric:         Mood and Affect: Mood normal.         Behavior: Behavior normal.          Last Recorded Vitals  /86   Pulse 86   Temp 36.7 °C (98.1 °F)   Resp 17   Wt 93 kg (205 lb)     Relevant Results      No results found for this or any previous visit (from the past 24 hour(s)).   Lab Results   Component Value Date    HGBA1C 5.9 (H) 01/18/2024      MR brain wo IV contrast    Result Date: 1/19/2024  Interpreted By:  Tony Dupree,   and [facility] STUDY: MR ANGIO HEAD WO IV CONTRAST; MR BRAIN WO IV CONTRAST; MR ANGIO NECK WO IV CONTRAST;  1/19/2024 9:01 am   INDICATION:   Per EMR: 63-year-old female with recent episode of near syncope and dizziness.   COMPARISON:   MRI dated 06/14/2016   ACCESSION NUMBER(S): [id number]; [id number]; [id number]   ORDERING CLINICIAN: SANTANA ASTUDILLO   TECHNIQUE: Axial diffusion, axial T2, axial FLAIR, axial gradient echo T2, coronal T1, and sagittal T1 weighted MRI images of the brain were obtained without intravenous contrast administration. In addition, time-of-flight MRA images of the neck and intracranial vessels were obtained.  The source MRA images were reformatted in multiple planes.   FINDINGS: The diffusion-weighted images demonstrate a punctate focus of mild increased diffusion signal corresponding diminished signal on ADC map noted along the parasagittal posterior right frontal lobe convexity as seen on axial diffusion slice 87 of 120. The finding is not well defined on the corresponding FLAIR and T2 images and conceivably could be technical/artifactual in origin although a punctate focus of acute infarction can not be completely excluded.   Otherwise, no other abnormal diffusion restriction to suggest acute infarction is noted.   There is mild brain parenchymal volume loss.   There are mild nonspecific white matter changes within the cerebral hemispheres bilaterally as well as minimal ill-defined increased signal on the FLAIR and T2 images overlying the brainstem which while nonspecific, given the patient's age, likely represent sequelae of more remote small-vessel ischemic change.   There are small scattered foci of bright signal on the T2 images noted within the subinsular regions and basal ganglia bilaterally suggesting incidental mildly prominent perivascular spaces.   There is a retention cyst or polyp noted along the lateral left frontal sinus.   The MRA of the neck is somewhat limited  secondary to artifact obscuring segments of the common carotid arteries and proximal cervical segments of the vertebral arteries. No significant stenosis noted along the bilateral carotid bifurcations or adequately visualized cervical segments of the vertebral arteries.   The intracranial MRA demonstrates no significant intracranial stenosis or intracranial aneurysm.       The diffusion-weighted images demonstrate a punctate focus of mild increased diffusion signal corresponding diminished signal on ADC map noted along the parasagittal posterior right frontal lobe convexity as seen on axial diffusion slice 87 of 120. The finding is not well defined on the corresponding FLAIR and T2 images and conceivably could be technical/artifactual in origin although a punctate focus of acute infarction can not be completely excluded.   Otherwise, no other abnormal diffusion restriction to suggest acute infarction is noted.   There is mild brain parenchymal volume loss.   There are mild nonspecific white matter changes within the cerebral hemispheres bilaterally as well as minimal ill-defined increased signal on the FLAIR and T2 images overlying the brainstem which while nonspecific, given the patient's age, likely represent sequelae of more remote small-vessel ischemic change.   The MRA of the neck is somewhat limited secondary to artifact obscuring segments of the common carotid arteries and proximal cervical segments of the vertebral arteries. No significant stenosis noted along the bilateral carotid bifurcations or adequately visualized cervical segments of the vertebral arteries.   The intracranial MRA demonstrates no significant intracranial stenosis or intracranial aneurysm.     I personally reviewed the images/study and I agree with Dr. Promise Lang findings as stated. This study was interpreted at [facility], [place], [place]   MACRO: None.   Signed by: Tony Dupree 1/19/2024 9:56 AM  Dictation workstation:   [id number]    MR angio neck wo IV contrast    Result Date: 1/19/2024  Interpreted By:  Tony Dupree and Kamau Nyokabi STUDY: MR ANGIO HEAD WO IV CONTRAST; MR BRAIN WO IV CONTRAST; MR ANGIO NECK WO IV CONTRAST;  1/19/2024 9:01 am   INDICATION:   Per EMR: 63-year-old female with recent episode of near syncope and dizziness.   COMPARISON:   MRI dated 06/14/2016   ACCESSION NUMBER(S): [id number]; [id number]; [id number]   ORDERING CLINICIAN: SANTANA ASTUDILLO   TECHNIQUE: Axial diffusion, axial T2, axial FLAIR, axial gradient echo T2, coronal T1, and sagittal T1 weighted MRI images of the brain were obtained without intravenous contrast administration. In addition, time-of-flight MRA images of the neck and intracranial vessels were obtained.  The source MRA images were reformatted in multiple planes.   FINDINGS: The diffusion-weighted images demonstrate a punctate focus of mild increased diffusion signal corresponding diminished signal on ADC map noted along the parasagittal posterior right frontal lobe convexity as seen on axial diffusion slice 87 of 120. The finding is not well defined on the corresponding FLAIR and T2 images and conceivably could be technical/artifactual in origin although a punctate focus of acute infarction can not be completely excluded.   Otherwise, no other abnormal diffusion restriction to suggest acute infarction is noted.   There is mild brain parenchymal volume loss.   There are mild nonspecific white matter changes within the cerebral hemispheres bilaterally as well as minimal ill-defined increased signal on the FLAIR and T2 images overlying the brainstem which while nonspecific, given the patient's age, likely represent sequelae of more remote small-vessel ischemic change.   There are small scattered foci of bright signal on the T2 images noted within the subinsular regions and basal ganglia bilaterally suggesting incidental mildly prominent perivascular  spaces.   There is a retention cyst or polyp noted along the lateral left frontal sinus.   The MRA of the neck is somewhat limited secondary to artifact obscuring segments of the common carotid arteries and proximal cervical segments of the vertebral arteries. No significant stenosis noted along the bilateral carotid bifurcations or adequately visualized cervical segments of the vertebral arteries.   The intracranial MRA demonstrates no significant intracranial stenosis or intracranial aneurysm.       The diffusion-weighted images demonstrate a punctate focus of mild increased diffusion signal corresponding diminished signal on ADC map noted along the parasagittal posterior right frontal lobe convexity as seen on axial diffusion slice 87 of 120. The finding is not well defined on the corresponding FLAIR and T2 images and conceivably could be technical/artifactual in origin although a punctate focus of acute infarction can not be completely excluded.   Otherwise, no other abnormal diffusion restriction to suggest acute infarction is noted.   There is mild brain parenchymal volume loss.   There are mild nonspecific white matter changes within the cerebral hemispheres bilaterally as well as minimal ill-defined increased signal on the FLAIR and T2 images overlying the brainstem which while nonspecific, given the patient's age, likely represent sequelae of more remote small-vessel ischemic change.   The MRA of the neck is somewhat limited secondary to artifact obscuring segments of the common carotid arteries and proximal cervical segments of the vertebral arteries. No significant stenosis noted along the bilateral carotid bifurcations or adequately visualized cervical segments of the vertebral arteries.   The intracranial MRA demonstrates no significant intracranial stenosis or intracranial aneurysm.     I personally reviewed the images/study and I agree with Dr. Promise Lang findings as stated. This study was  interpreted at [place], [place]   MACRO: None.   Signed by: Tony Dupree 1/19/2024 9:56 AM Dictation workstation:   [id number]    MR angio head wo IV contrast    Result Date: 1/19/2024  Interpreted By:  Tony Dupree and Kamau Nyokabi STUDY: MR ANGIO HEAD WO IV CONTRAST; MR BRAIN WO IV CONTRAST; MR ANGIO NECK WO IV CONTRAST;  1/19/2024 9:01 am   INDICATION:   Per EMR: 63-year-old female with recent episode of near syncope and dizziness.   COMPARISON:   MRI dated 06/14/2016   ACCESSION NUMBER(S): [id number]; [id number]; [id number]   ORDERING CLINICIAN: SANTANA ASTUDILLO   TECHNIQUE: Axial diffusion, axial T2, axial FLAIR, axial gradient echo T2, coronal T1, and sagittal T1 weighted MRI images of the brain were obtained without intravenous contrast administration. In addition, time-of-flight MRA images of the neck and intracranial vessels were obtained.  The source MRA images were reformatted in multiple planes.   FINDINGS: The diffusion-weighted images demonstrate a punctate focus of mild increased diffusion signal corresponding diminished signal on ADC map noted along the parasagittal posterior right frontal lobe convexity as seen on axial diffusion slice 87 of 120. The finding is not well defined on the corresponding FLAIR and T2 images and conceivably could be technical/artifactual in origin although a punctate focus of acute infarction can not be completely excluded.   Otherwise, no other abnormal diffusion restriction to suggest acute infarction is noted.   There is mild brain parenchymal volume loss.   There are mild nonspecific white matter changes within the cerebral hemispheres bilaterally as well as minimal ill-defined increased signal on the FLAIR and T2 images overlying the brainstem which while nonspecific, given the patient's age, likely represent sequelae of more remote small-vessel ischemic change.   There are small scattered foci of bright signal  on the T2 images noted within the subinsular regions and basal ganglia bilaterally suggesting incidental mildly prominent perivascular spaces.   There is a retention cyst or polyp noted along the lateral left frontal sinus.   The MRA of the neck is somewhat limited secondary to artifact obscuring segments of the common carotid arteries and proximal cervical segments of the vertebral arteries. No significant stenosis noted along the bilateral carotid bifurcations or adequately visualized cervical segments of the vertebral arteries.   The intracranial MRA demonstrates no significant intracranial stenosis or intracranial aneurysm.       The diffusion-weighted images demonstrate a punctate focus of mild increased diffusion signal corresponding diminished signal on ADC map noted along the parasagittal posterior right frontal lobe convexity as seen on axial diffusion slice 87 of 120. The finding is not well defined on the corresponding FLAIR and T2 images and conceivably could be technical/artifactual in origin although a punctate focus of acute infarction can not be completely excluded.   Otherwise, no other abnormal diffusion restriction to suggest acute infarction is noted.   There is mild brain parenchymal volume loss.   There are mild nonspecific white matter changes within the cerebral hemispheres bilaterally as well as minimal ill-defined increased signal on the FLAIR and T2 images overlying the brainstem which while nonspecific, given the patient's age, likely represent sequelae of more remote small-vessel ischemic change.   The MRA of the neck is somewhat limited secondary to artifact obscuring segments of the common carotid arteries and proximal cervical segments of the vertebral arteries. No significant stenosis noted along the bilateral carotid bifurcations or adequately visualized cervical segments of the vertebral arteries.   The intracranial MRA demonstrates no significant intracranial stenosis or  intracranial aneurysm.     I personally reviewed the images/study and I agree with Dr. Promise Lang findings as stated. This study was interpreted at [facility], [place], [place]   MACRO: None.   Signed by: Tony Dupree 1/19/2024 9:56 AM Dictation workstation:   [id number]    ECG 12 lead    Result Date: 1/18/2024  Sinus rhythm with occasional Premature ventricular complexes Anterolateral infarct , age undetermined Abnormal ECG No previous ECGs available    ECG 12 lead    Result Date: 1/18/2024  Normal sinus rhythm Possible Anterior infarct (cited on or before 18-JAN-2024) Abnormal ECG When compared with ECG of 18-JAN-2024 13:17, (unconfirmed) Premature ventricular complexes are no longer Present Questionable change in initial forces of Lateral leads    CT abdomen pelvis w IV contrast    Result Date: 1/18/2024  STUDY: CT Abdomen and Pelvis with IV Contrast; 1/18/2024 2:57 PM INDICATION: Rectal pain. COMPARISON: 11/30/2021 CT abdomen and pelvis. ACCESSION NUMBER(S): [id number] ORDERING CLINICIAN: DIANA MURILLO TECHNIQUE: CT of the abdomen and pelvis was performed.  Contiguous axial images were obtained at 3 mm slice thickness through the abdomen and pelvis. Coronal and sagittal reconstructions at 3 mm slice thickness were performed.  Omnipaque 350 75 mL was administered intravenously.  FINDINGS: LOWER CHEST: No cardiomegaly.  No pericardial effusion.  Lung bases are clear.  ABDOMEN:  LIVER: No hepatomegaly.  Smooth surface contour.  Low attenuation  BILE DUCTS: No intrahepatic or extrahepatic biliary ductal dilatation.  GALLBLADDER: The gallbladder is absent. STOMACH: No abnormalities identified.  PANCREAS: No masses or ductal dilatation.  SPLEEN: No splenomegaly or focal splenic lesion.  ADRENAL GLANDS: No thickening or nodules.  KIDNEYS AND URETERS: Kidneys are normal in size and location.  No renal or ureteral calculi.  2 cm hypodense cysts posterior aspect of the mid left  kidney  PELVIS:  BLADDER: No abnormalities identified.  REPRODUCTIVE ORGANS: No abnormalities identified.  BOWEL: Moderate amount of stool within the rectum.  Negative for bowel obstruction.  No CT findings of diverticulitis.  Normal appendix mild wall thickening and hazy infiltrative changes within the soft tissues at the level of the anus image 147 series 201.  No perianal abscess.  VESSELS: No abnormalities identified.  Abdominal aorta is normal in caliber.  PERITONEUM/RETROPERITONEUM/LYMPH NODES: No free fluid.  No pneumoperitoneum. No lymphadenopathy.  ABDOMINAL WALL: No abnormalities identified. SOFT TISSUES: No abnormalities identified.  BONES: No acute fracture or aggressive osseous lesion.    Mild wall thickening and hazy infiltrative changes at the level of the anus suggestive of anusitis.  No perianal abscess. Mild constipation. Normal appendix. Negative for diverticulitis. Hepatic steatosis. Signed by Barrett Jarquin MD    CT head wo IV contrast    Result Date: 1/18/2024  Interpreted By:  John Su, STUDY: CT HEAD WO IV CONTRAST; 1/18/2024 2:50 pm   INDICATION: Signs/Symptoms:Near syncope.   COMPARISON: None.   ACCESSION NUMBER(S): [id number]   ORDERING CLINICIAN: DIANA MURILLO   TECHNIQUE: Contiguous axial CT images were obtained through the head at 5 mm slice thickness without contrast administration.   FINDINGS: INTRACRANIAL: The ventricles, sulci and basal cisterns are within normal limits for size and configuration. The grey-white differentiation is intact. There is no mass effect or midline shift. There is no extraaxial fluid collection. There is no intracranial hemorrhage.  The calvarium is unremarkable.   EXTRACRANIAL: Visualized paranasal sinuses and mastoids are clear.       No evidence of acute cortical infarct or intracranial hemorrhage.   MACRO: None     Signed by: John Su 1/18/2024 3:05 PM Dictation workstation:   [id number]      Active Problems:  There are no active Hospital  Problems.     Assessment/Plan   Diagnoses and all orders for this visit:  Anal fistula  -     Case Request Operating Room: Repair Fistula Anus; Standing  -     Request for Pre-Admission Testing Visit; Future  Other orders  -     Place in outpatient/hospital ambulatory surgery; Standing  -     lactated Ringer's infusion  -     acetaminophen (Tylenol) tablet 975 mg  -     gabapentin (Neurontin) capsule 300 mg  -     ceFAZolin (Ancef) 2 g in dextrose 5 % in water (D5W) 50 mL IV    Plan for anoscopy with fistulectomy vs cutting seton placement.     Discussed plan         Ruth Mcintosh MD

## 2024-01-23 ENCOUNTER — PATIENT OUTREACH (OUTPATIENT)
Dept: CARE COORDINATION | Facility: CLINIC | Age: 64
End: 2024-01-23
Payer: COMMERCIAL

## 2024-01-23 ENCOUNTER — HOSPITAL ENCOUNTER (OUTPATIENT)
Facility: HOSPITAL | Age: 64
Setting detail: OUTPATIENT SURGERY
End: 2024-01-23
Attending: SURGERY | Admitting: SURGERY
Payer: COMMERCIAL

## 2024-01-23 PROBLEM — K60.3 ANAL FISTULA: Status: ACTIVE | Noted: 2024-01-22

## 2024-01-23 PROBLEM — K60.30 ANAL FISTULA: Status: ACTIVE | Noted: 2024-01-22

## 2024-01-23 LAB
BACTERIA BLD CULT: NORMAL
BACTERIA BLD CULT: NORMAL

## 2024-01-23 NOTE — PROGRESS NOTES
Outreach call to patient to support a smooth transition of care from recent admission.  Spoke with patient, reviewed discharge medications, discharge instructions, assessed social needs, and provided education on importance of follow-up appointment with provider. Enrolled patient in [employer] chatbot for additional support and education through transition period.  Will continue to monitor through transition period.  Medications  Medications reviewed with patient/caregiver?: Yes (1/23/2024  4:39 PM)  Is the patient having any side effects they believe may be caused by any medication additions or changes?: No (1/23/2024  4:39 PM)  Does the patient have all medications ordered at discharge?: Yes (1/23/2024  4:39 PM)  Care Management Interventions: Provided patient education (1/23/2024  4:39 PM)  Is the patient taking all medications as directed (includes completed medication regime)?: Yes (1/23/2024  4:39 PM)  Care Management Interventions: Provided patient education; Advised patient to call provider (1/23/2024  4:39 PM)    Appointments  Has the patient kept scheduled appointments due by today?: Yes (1/23/2024  4:39 PM)    Patient Teaching  Does the patient have access to their discharge instructions?: Yes (1/23/2024  4:39 PM)  Care Management Interventions: Reviewed instructions with patient (1/23/2024  4:39 PM)  What is the patient's perception of their health status since discharge?: Improving (1/23/2024  4:39 PM)    Patient will be readmitting in March for surgery.   At this time all questions were answered and she did keep my contact information for future use.  alvaro

## 2024-02-22 ENCOUNTER — PRE-ADMISSION TESTING (OUTPATIENT)
Dept: PREADMISSION TESTING | Facility: HOSPITAL | Age: 64
End: 2024-02-22
Payer: COMMERCIAL

## 2024-02-22 VITALS
BODY MASS INDEX: 31.55 KG/M2 | SYSTOLIC BLOOD PRESSURE: 122 MMHG | HEART RATE: 62 BPM | TEMPERATURE: 96.8 F | HEIGHT: 67 IN | OXYGEN SATURATION: 100 % | RESPIRATION RATE: 16 BRPM | DIASTOLIC BLOOD PRESSURE: 78 MMHG | WEIGHT: 201 LBS

## 2024-02-22 VITALS
HEIGHT: 67 IN | SYSTOLIC BLOOD PRESSURE: 122 MMHG | OXYGEN SATURATION: 100 % | BODY MASS INDEX: 31.55 KG/M2 | RESPIRATION RATE: 16 BRPM | HEART RATE: 62 BPM | DIASTOLIC BLOOD PRESSURE: 78 MMHG | TEMPERATURE: 96.8 F | WEIGHT: 201 LBS

## 2024-02-22 PROCEDURE — 99203 OFFICE O/P NEW LOW 30 MIN: CPT | Performed by: PHYSICIAN ASSISTANT

## 2024-02-22 ASSESSMENT — ENCOUNTER SYMPTOMS
CONSTIPATION: 1
CARDIOVASCULAR NEGATIVE: 1
DIARRHEA: 1
EYES NEGATIVE: 1
FATIGUE: 1
CHEST TIGHTNESS: 1
ARTHRALGIAS: 1
PSYCHIATRIC NEGATIVE: 1
ENDOCRINE NEGATIVE: 1
LIGHT-HEADEDNESS: 1

## 2024-02-22 ASSESSMENT — CHADS2 SCORE
HYPERTENSION: NO
PRIOR STROKE OR TIA OR THROMBOEMBOLISM: NO
AGE GREATER THAN OR EQUAL TO 75: NO
CHF: NO
CHADS2 SCORE: 0
DIABETES: NO

## 2024-02-22 ASSESSMENT — DUKE ACTIVITY SCORE INDEX (DASI)
CAN YOU PARTICIPATE IN MODERATE RECREATIONAL ACTIVITIES LIKE GOLF, BOWLING, DANCING, DOUBLES TENNIS OR THROWING A BASEBALL OR FOOTBALL: YES
CAN YOU DO YARD WORK LIKE RAKING LEAVES, WEEDING OR PUSHING A MOWER: YES
DASI METS SCORE: 8
CAN YOU WALK INDOORS, SUCH AS AROUND YOUR HOUSE: YES
TOTAL_SCORE: 42.7
CAN YOU CLIMB A FLIGHT OF STAIRS OR WALK UP A HILL: YES
CAN YOU HAVE SEXUAL RELATIONS: YES
CAN YOU TAKE CARE OF YOURSELF (EAT, DRESS, BATHE, OR USE TOILET): YES
CAN YOU PARTICIPATE IN STRENOUS SPORTS LIKE SWIMMING, SINGLES TENNIS, FOOTBALL, BASKETBALL, OR SKIING: NO
CAN YOU WALK A BLOCK OR TWO ON LEVEL GROUND: YES
CAN YOU DO MODERATE WORK AROUND THE HOUSE LIKE VACUUMING, SWEEPING FLOORS OR CARRYING GROCERIES: YES
CAN YOU RUN A SHORT DISTANCE: NO
CAN YOU DO LIGHT WORK AROUND THE HOUSE LIKE DUSTING OR WASHING DISHES: YES
CAN YOU DO HEAVY WORK AROUND THE HOUSE LIKE SCRUBBING FLOORS OR LIFTING AND MOVING HEAVY FURNITURE: YES

## 2024-02-22 ASSESSMENT — PAIN DESCRIPTION - DESCRIPTORS: DESCRIPTORS: ACHING

## 2024-02-22 ASSESSMENT — PAIN SCALES - GENERAL
PAINLEVEL_OUTOF10: 3
PAINLEVEL_OUTOF10: 3

## 2024-02-22 ASSESSMENT — PAIN - FUNCTIONAL ASSESSMENT: PAIN_FUNCTIONAL_ASSESSMENT: 0-10

## 2024-02-22 NOTE — PREPROCEDURE INSTRUCTIONS
PAT DISCHARGE INSTRUCTIONS    Please call the Same Day Surgery (SDS) Department of the hospital where your procedure will be performed after 2:00 PM the day before your surgery. If you are scheduled on a Monday, or a Tuesday following a Monday holiday, you will need to call on the last business day prior to your surgery.    [facility]  [address], [address]  [phone]    [facility]  [address] [place] [address] [phone]    [facility]  [address]  [place] [id number]  [phone]    Please let your surgeon know if:      You develop any open sores, shingles, burning or painful urination as these may increase your risk of an infection.   You no longer wish to have the surgery.   Any other personal circumstances change that may lead to the need to cancel or defer this surgery-such as being sick or getting admitted to any hospital within one week of your planned procedure.    Your contact details change, such as a change of address or phone number.    Starting now:     Please DO NOT drink alcohol or smoke for 24 hours before surgery. It is well known that quitting smoking can make a huge difference to your health and recovery from surgery. The longer you abstain from smoking, the better your chances of a healthy recovery. If you need help with quitting, call -800-QUIT-NOW to be connected to a trained counselor who will discuss the best methods to help you quit.     Before your surgery:    Please stop all supplements 7 days prior to surgery. Or as directed by your surgeon.   Please stop taking NSAID pain medicine such as Advil and Motrin 7 days before surgery.    If you develop any fever, cough, cold, rashes, cuts, scratches, scrapes, urinary symptoms or infection anywhere on your body (including teeth and gums) prior to surgery, please call your surgeon’s office as soon  as possible. This may require treatment to reduce the chance of cancellation on the day of surgery.    The day before your surgery:   DIET- Do not eat or drink anything after midnight the night before surgery, including mints, candy and gum.   Get a good night’s rest.  Use the special soap for bathing if you have been instructed to use one.    Scheduled surgery times may change and you will be notified if this occurs - please check your personal voicemail for any updates.     On the morning of surgery:   Wear comfortable, loose fitting clothes which open in the front. Please do not wear moisturizers, creams, lotions, makeup or perfume.    Please bring with you to surgery:   Photo ID and insurance card   Current list of medicines and allergies   Pacemaker/ Defibrillator/Heart stent cards   CPAP machine and mask    Slings/ splints/ crutches   A copy of your complete advanced directive/DHPOA.    Please do NOT bring with you to surgery:   All jewelry and valuables should be left at home.   Prosthetic devices such as contact lenses, hearing aids, dentures, eyelash extensions, hairpins and body piercings must be removed prior to going in to the surgical suite.    After outpatient surgery:   A responsible adult MUST accompany you at the time of discharge and stay with you for 24 hours after your surgery. You may NOT drive yourself home after surgery.    Do not drive, operate machinery, make critical decisions or do activities that require co-ordination or balance until after a night’s sleep.   Do not drink alcoholic beverages for 24 hours.   Instructions for resuming your medications will be provided by your surgeon.    CALL YOUR DOCTOR AFTER SURGERY IF YOU HAVE:     Chills and/or a fever of 101° F or higher.    Redness, swelling, pus or drainage from your surgical wound or a bad smell from the wound.    Lightheadedness, fainting or confusion.    Persistent vomiting (throwing up) and are not able to eat or drink for 12  hours.    Three or more loose, watery bowel movements in 24 hours (diarrhea).   Difficulty or pain while urinating( after non-urological surgery)    Pain and swelling in your legs, especially if it is only on one side.    Difficulty breathing or are breathing faster than normal.    Any new concerning symptoms.     Medication List            Accurate as of February 22, 2024 12:17 PM. Always use your most recent med list.                ascorbic acid 500 mg tablet  Commonly known as: Vitamin C  Medication Adjustments for Surgery: Stop 7 days before surgery     aspirin 81 mg EC tablet  Medication Adjustments for Surgery: Stop 7 days before surgery     cholecalciferol 25 MCG (1000 UT) tablet  Commonly known as: Vitamin D-3  Medication Adjustments for Surgery: Stop 7 days before surgery     cyanocobalamin 1,000 mcg tablet  Commonly known as: Vitamin B-12  Medication Adjustments for Surgery: Stop 7 days before surgery     ibuprofen 800 mg tablet  Medication Adjustments for Surgery: Stop 7 days before surgery     simvastatin 20 mg tablet  Commonly known as: Zocor  Take 1 tablet (20 mg) by mouth once daily at bedtime.  Notes to patient: Take evening dose before surgery.     vitamin E 180 mg (400 unit) capsule  Medication Adjustments for Surgery: Stop 7 days before surgery

## 2024-02-22 NOTE — CPM/PAT H&P
"CPM/PAT Evaluation       Name: Reanna Pineda (Reanna Pineda)  /Age: 1960/63 y.o.     In-Person       Chief Complaint: \"rectal abscess\"    HPI  The patient is a 63 year old female.  She appears to be a vague historian.  In  she developed a veronique-rectal abscess that required incision and drainage twice.  She recovered and did well post operatively.  In  she began to experience rectal pain with bowel movements.  She has associated minimal bright red rectal bleeding and yellowish drainage.  Her bowel movements vary from diarrhea to constipation.  She was hospitalized at [facility] on [facility]24 and was started on Augmentin.  She followed up with Dr. Mcintosh and surgical intervention is recommended.    Past Medical History:   Diagnosis Date    Adverse effect of anesthesia     COMBATIVE X 1 AFTER    Angina pectoris (CMS/HCC)     Arthritis     Awareness under anesthesia     during colonoscopy    Bronchitis     GERD (gastroesophageal reflux disease)     High cholesterol        Past Surgical History:   Procedure Laterality Date    ABSCESS DRAINAGE      CHOLECYSTECTOMY      COLONOSCOPY      ENDOMETRIAL ABLATION      INCISION AND DRAINAGE PERIRECTAL ABSCESS  2021    X 2       Family History   Problem Relation Name Age of Onset    Other (cardiac problems) Mother      Diabetes Mother      Hypertension Mother      Cancer Mother      Other (cardiac problems) Father      Diabetes Father      Hypertension Father      Cancer Father       Social History     Tobacco Use    Smoking status: Never    Smokeless tobacco: Never   Substance Use Topics    Alcohol use: Yes     Alcohol/week: 6.0 standard drinks of alcohol     Types: 6 Standard drinks or equivalent per week     Comment: STATES CUT BACK ON USE     Social History     Substance and Sexual Activity   Drug Use Never     Allergies   Allergen Reactions    Percocet [Oxycodone-Acetaminophen] GI Upset and Nausea/vomiting    Other Other     POSSIBLE " "PCN ALLERGY as child     No current facility-administered medications for this encounter.     Current Outpatient Medications   Medication Sig Dispense Refill    ascorbic acid (Vitamin C) 500 mg tablet Take 1 tablet (500 mg) by mouth once daily.      aspirin 81 mg EC tablet Take 1 tablet (81 mg) by mouth every other day.      cholecalciferol (Vitamin D-3) 25 MCG (1000 UT) tablet Take 1 tablet (25 mcg) by mouth once daily.      cyanocobalamin (Vitamin B-12) 1,000 mcg tablet Take 100 mcg by mouth once daily.      ibuprofen 800 mg tablet Take 1 tablet (800 mg) by mouth every 8 hours if needed for moderate pain (4 - 6).      simvastatin (Zocor) 20 mg tablet Take 1 tablet (20 mg) by mouth once daily at bedtime. 90 tablet 1    vitamin E 180 mg (400 unit) capsule Take 1 capsule (400 Units) by mouth once daily.       Review of Systems   Constitutional:  Positive for fatigue.   HENT: Negative.     Eyes: Negative.    Respiratory:  Positive for chest tightness (occasional).    Cardiovascular: Negative.    Gastrointestinal:  Positive for constipation and diarrhea.   Endocrine: Negative.    Genitourinary: Negative.    Musculoskeletal:  Positive for arthralgias.   Neurological:  Positive for light-headedness.   Psychiatric/Behavioral: Negative.       /78   Pulse 62   Temp 36 °C (96.8 °F)   Resp 16   Ht 1.702 m (5' 7\")   Wt 91.2 kg (201 lb)   SpO2 100%   BMI 31.48 kg/m²     Physical Exam  Vitals reviewed.   Constitutional:       Appearance: Normal appearance.   HENT:      Head: Normocephalic and atraumatic.      Mouth/Throat:      Mouth: Mucous membranes are moist.      Pharynx: Oropharynx is clear.   Eyes:      Extraocular Movements: Extraocular movements intact.      Pupils: Pupils are equal, round, and reactive to light.      Comments: Glasses     Cardiovascular:      Rate and Rhythm: Normal rate and regular rhythm.      Heart sounds: Normal heart sounds.   Pulmonary:      Breath sounds: Normal breath sounds. "   Abdominal:      General: Bowel sounds are normal.      Palpations: Abdomen is soft.   Musculoskeletal:         General: Normal range of motion.   Skin:     General: Skin is warm and dry.   Neurological:      General: No focal deficit present.      Mental Status: She is alert and oriented to person, place, and time.   Psychiatric:         Mood and Affect: Mood normal.         Behavior: Behavior normal.        PAT AIRWAY:   Airway:     Mallampati::  II    TM distance::  >3 FB    Neck ROM::  Full   Teeth intact    ASA:  II  DASI RISK SCORE:  42.7  METS SCORE:  8  CHAD2 SCORE:  1.9%  REVISED CARDIAC RISK INDEX:  0.4%  STOP BANG SCORE:  3    EKG 1/18/2024 at [facility] ED - sinus rhythm with occasional premature ventricular complexes, anterolateral infarct, age undetermined    CBC, BMP - 1/19/2024    Assessment and Plan:     Anal fistula:  Repair anal fistula  H/O angina - only followed by PCP - requesting cardiac clearance    Rossy Sims PA-C

## 2024-02-23 ENCOUNTER — OFFICE VISIT (OUTPATIENT)
Dept: OTOLARYNGOLOGY | Facility: CLINIC | Age: 64
End: 2024-02-23
Payer: COMMERCIAL

## 2024-02-23 DIAGNOSIS — R42 LIGHTHEADEDNESS: Primary | ICD-10-CM

## 2024-02-23 PROCEDURE — 1036F TOBACCO NON-USER: CPT | Performed by: OTOLARYNGOLOGY

## 2024-02-23 PROCEDURE — 99204 OFFICE O/P NEW MOD 45 MIN: CPT | Performed by: OTOLARYNGOLOGY

## 2024-02-23 NOTE — PROGRESS NOTES
"History Of Present Illness  Reanna Pineda is a 63 y.o. female presenting with: \"Lightheaded, dizziness\".    Since January 18th, 2024 she has dizziness. She got up and room started to spin. She went to [facility] ER. She had a CT scan, which did not show any blockage in her head or neck. She still feels lightheaded, like she is gonna pass out. Spinning vertigo has not happened since January 18th.     History of BPPV.   Left ear off and on feels plugged.  No change in hearing  Once in a while she has tinnitus.  Allergies in spring can bother her ears.      Mj Hallpike exam did no show any vertigo.    My impression she may have a cochlear pathology causing lightheadedness and intermittent fullness in left ear, or she may have another condition like a cardiac disease or BP problem.    Plan:  1- Audiovestibular tests  2- follow up after the tests    Past Medical History  She has a past medical history of Adverse effect of anesthesia, Angina pectoris (CMS/HCC), Arthritis, Awareness under anesthesia, Bronchitis, GERD (gastroesophageal reflux disease), and High cholesterol.    Surgical History  She has a past surgical history that includes Endometrial ablation; Cholecystectomy; Abscess drainage (2021); Colonoscopy (2021); and Incision and drainage perirectal abscess (2021).     Social History  She reports that she has never smoked. She has never used smokeless tobacco. She reports current alcohol use of about 6.0 standard drinks of alcohol per week. She reports that she does not use drugs.    Family History  Family History   Problem Relation Name Age of Onset    Other (cardiac problems) Mother      Diabetes Mother      Hypertension Mother      Cancer Mother      Other (cardiac problems) Father      Diabetes Father      Hypertension Father      Cancer Father          Allergies  Percocet [oxycodone-acetaminophen] and Other    Review of Systems   Feeling poorly  Feeling tired  Blurry vision  Double vision  Ear " pain  Vertigo  Ears feel full  Snoring  Dizziness upon standing  Palpitations  Leg swelling  Nausea  Joint pain  Unusual skin growth  Headache  Weakness  Anxiety?  Easy bruising     Physical Exam    General appearance: Healthy-appearing, well-nourished, well groomed, in no acute distress.     Head and Face: Atraumatic with no masses, lesions, or scarring.      Salivary glands: No tenderness of the parotid glands or parotid masses.     No tenderness of the submandibular glands or submandibular masses.      Facial strength: Normal strength and symmetry, no synkinesis or facial tic.     Eyes: Conjunctivas look non-hyperemic bilaterally    Ears: Bilaterally ear canals look normal. Tympanic membranes look intact, no hyperemia, fluid or retraction. Hearing grossly normal.      Nose: Mucosa looks normal. No purulent discharge. .     Oral Cavity/Mouth: Lips and tongue look normal.     Throat: No postnasal discharge. No tonsil hypertrophy. No hyperemia.    Neck: Symmetrical, trachea midline.     Pulmonary: Normal respiratory effort.     Lymphatic: No palpable pathologic lymph nodes at neck.     Neurological/Psychiatric Orientation to person, place, and time: Normal.     Mood and affect: Normal.      Extremities: No clubbing.     Skin: No significant skin lesions were noted at face or neck        Procedure         Last Recorded Vitals  There were no vitals taken for this visit.    Relevant Results  Interpreted By:  Tony Dupree and [place]   STUDY:  MR ANGIO HEAD WO IV CONTRAST; MR BRAIN WO IV CONTRAST; MR ANGIO NECK  WO IV CONTRAST;  1/19/2024 9:01 am      INDICATION:      Per EMR: 63-year-old female with recent episode of near syncope and  dizziness.      COMPARISON:      MRI dated 06/14/2016      ACCESSION NUMBER(S):  [id number]; [id number]; [id number]      ORDERING CLINICIAN:  SANTANA ASTUDILLO      TECHNIQUE:  Axial diffusion, axial T2, axial FLAIR, axial gradient echo T2,  coronal T1, and sagittal T1  weighted MRI images of the brain were  obtained without intravenous contrast administration. In addition,  time-of-flight MRA images of the neck and intracranial vessels were  obtained.  The source MRA images were reformatted in multiple planes.      FINDINGS:  The diffusion-weighted images demonstrate a punctate focus of mild  increased diffusion signal corresponding diminished signal on ADC map  noted along the parasagittal posterior right frontal lobe convexity  as seen on axial diffusion slice 87 of 120. The finding is not well  defined on the corresponding FLAIR and T2 images and conceivably  could be technical/artifactual in origin although a punctate focus of  acute infarction can not be completely excluded.      Otherwise, no other abnormal diffusion restriction to suggest acute  infarction is noted.      There is mild brain parenchymal volume loss.      There are mild nonspecific white matter changes within the cerebral  hemispheres bilaterally as well as minimal ill-defined increased  signal on the FLAIR and T2 images overlying the brainstem which while  nonspecific, given the patient's age, likely represent sequelae of  more remote small-vessel ischemic change.      There are small scattered foci of bright signal on the T2 images  noted within the subinsular regions and basal ganglia bilaterally  suggesting incidental mildly prominent perivascular spaces.      There is a retention cyst or polyp noted along the lateral left  frontal sinus.      The MRA of the neck is somewhat limited secondary to artifact  obscuring segments of the common carotid arteries and proximal  cervical segments of the vertebral arteries. No significant stenosis  noted along the bilateral carotid bifurcations or adequately  visualized cervical segments of the vertebral arteries.      The intracranial MRA demonstrates no significant intracranial  stenosis or intracranial aneurysm.      IMPRESSION:  The diffusion-weighted images  "demonstrate a punctate focus of mild  increased diffusion signal corresponding diminished signal on ADC map  noted along the parasagittal posterior right frontal lobe convexity  as seen on axial diffusion slice 87 of 120. The finding is not well  defined on the corresponding FLAIR and T2 images and conceivably  could be technical/artifactual in origin although a punctate focus of  acute infarction can not be completely excluded.      Otherwise, no other abnormal diffusion restriction to suggest acute  infarction is noted.      There is mild brain parenchymal volume loss.      There are mild nonspecific white matter changes within the cerebral  hemispheres bilaterally as well as minimal ill-defined increased  signal on the FLAIR and T2 images overlying the brainstem which while  nonspecific, given the patient's age, likely represent sequelae of  more remote small-vessel ischemic change.      The MRA of the neck is somewhat limited secondary to artifact  obscuring segments of the common carotid arteries and proximal  cervical segments of the vertebral arteries. No significant stenosis  noted along the bilateral carotid bifurcations or adequately  visualized cervical segments of the vertebral arteries.      The intracranial MRA demonstrates no significant intracranial  stenosis or intracranial aneurysm.          I personally reviewed the images/study and I agree with Dr. Promise Lang findings as stated. This study was interpreted at [facility], [place], [place]    EKG : Sinus rhythm with occasional Premature ventricular complexes  Anterolateral infarct , age undetermined  Abnormal ECG  No previous ECGs available  See ED provider note for full interpretation and clinical correlation  Confirmed by Christine Long ([id number]) on 1/19/2024 10:23:10 AM      Assessment and Plan:  Reanna Pineda is a 63 y.o. female presenting with: \"Lightheaded, dizziness\".    Since January 18th, 2024 she has " dizziness. She got up and room started to spin. She went to [facility] ER. She had a CT scan, which did not show any blockage in her head or neck. She still feels lightheaded, like she is gonna pass out. Spinning vertigo has not happened since January 18th.     History of BPPV.   Left ear off and on feels plugged.  No change in hearing  Once in a while she has tinnitus.  Allergies in spring can bother her ears.      Mj Hallpike exam did no show any vertigo.    My impression she may have a cochlear pathology causing lightheadedness and intermittent fullness in left ear, or she may have another condition like a cardiac disease or BP problem.    Plan:  1- Audiovestibular tests  2- follow up after the tests    Hugh Agrawal  Otolaryngology - Head & Neck Surgery

## 2024-02-26 ENCOUNTER — OFFICE VISIT (OUTPATIENT)
Dept: PRIMARY CARE | Facility: CLINIC | Age: 64
End: 2024-02-26
Payer: COMMERCIAL

## 2024-02-26 VITALS
BODY MASS INDEX: 31.36 KG/M2 | WEIGHT: 200.2 LBS | DIASTOLIC BLOOD PRESSURE: 84 MMHG | SYSTOLIC BLOOD PRESSURE: 126 MMHG | OXYGEN SATURATION: 98 % | HEART RATE: 88 BPM | TEMPERATURE: 97.7 F

## 2024-02-26 DIAGNOSIS — R07.9 CHEST PAIN, UNSPECIFIED TYPE: Primary | ICD-10-CM

## 2024-02-26 DIAGNOSIS — Z78.9 ALCOHOL USE: ICD-10-CM

## 2024-02-26 DIAGNOSIS — K60.3 ANAL FISTULA: ICD-10-CM

## 2024-02-26 DIAGNOSIS — E78.00 HYPERCHOLESTEROLEMIA: ICD-10-CM

## 2024-02-26 DIAGNOSIS — K21.9 GASTROESOPHAGEAL REFLUX DISEASE, UNSPECIFIED WHETHER ESOPHAGITIS PRESENT: ICD-10-CM

## 2024-02-26 DIAGNOSIS — R42 VERTIGO: ICD-10-CM

## 2024-02-26 DIAGNOSIS — R00.2 PALPITATIONS: ICD-10-CM

## 2024-02-26 PROCEDURE — 1036F TOBACCO NON-USER: CPT | Performed by: INTERNAL MEDICINE

## 2024-02-26 PROCEDURE — 99214 OFFICE O/P EST MOD 30 MIN: CPT | Performed by: INTERNAL MEDICINE

## 2024-02-26 RX ORDER — SIMVASTATIN 20 MG/1
20 TABLET, FILM COATED ORAL NIGHTLY
Qty: 90 TABLET | Refills: 1 | Status: SHIPPED | OUTPATIENT
Start: 2024-02-26 | End: 2024-05-09 | Stop reason: SDUPTHER

## 2024-02-26 ASSESSMENT — ENCOUNTER SYMPTOMS
CHEST TIGHTNESS: 1
SORE THROAT: 0
UNEXPECTED WEIGHT CHANGE: 0
FATIGUE: 1
ARTHRALGIAS: 0
DIARRHEA: 0
DIZZINESS: 1
WHEEZING: 0
PALPITATIONS: 0
ABDOMINAL DISTENTION: 1
SINUS PAIN: 0
HEADACHES: 0
ABDOMINAL PAIN: 0
BLOOD IN STOOL: 0
COUGH: 0
FEVER: 0
DIFFICULTY URINATING: 0
BRUISES/BLEEDS EASILY: 0

## 2024-02-26 NOTE — PROGRESS NOTES
Subjective   Patient ID: Reanna Pineda is a 63 y.o. female who presents for Hyperlipidemia, Dizziness (Seen in ER 1/18), Fatigue, and Nausea.    - Patient comes today for evaluation on January 18 patient called office for perirectal swelling advised with the ER found to have perianal abscess which was drained start an antibiotic patient did not follow-up postoperatively waited until today's appointment  - Patient vague historian records reviewed from hospital admission and recent consultation  Patient on admission found to have dizziness workup for stroke was negative EKG nonspecific findings discharged home awaiting to be treated from perianal fistula  Patient when she saw PA for preoperative clearance described angina, patient advised to have cardiac clearance patient referred to cardiology Dr. Chase scheduled to see him tomorrow  -Patient dizziness started since January 18 seen by otolaryngology awaiting to follow-up testing which patient did not arrange yet patient advised to obtain it after cardiac clearance  -Patient previous cardiac scoring 6 maximize medical management needs to follow-up cardiology for further eval  - Discussed with patient obesity and breast shadow on EKG may suggest old myocardial infarction need to confirm that by cardiac workup  - Patient described chest pressure and air bubble in the left side of her chest all the time discussed with patient possible underlying reflux disease need to proceed with upper endoscopy if cardiac clearance negative will arrange next appointment  - Hypercholesterolemia patient counseled about maximizing low-fat diet low triglyceride diet  Counseled about weight loss  Continue simvastatin  Repeat blood work in our exam in 6 months  - - Reflux disease symptoms not persistent counseled about meal size on diet controlled follow-up if no improvement.  -Obesity BMI now 31  I spent >15minutes minutes face to face with individial providing recommendations for  nutrition choices and exercise plan to help achieve weight reduction.  -History of largely perianal sepsis required surgery twice and admission with IV antibiotics for 1 week and [facility] now resolved with  -Screening colonoscopy done in 2021 need to repeat in 2026  -Patient consumes large amount of beer on a weekly basis counseled about alcohol abstinence.  Counseled about reflux disease and hiatal hernia possibility patient aware will readdress symptoms again after cardiac event and surgical intervention for perianal.  -Recent ER records recent blood work plan of care discussed with patient advised on her to follow-up postoperatively as recommended            Hyperlipidemia  Pertinent negatives include no chest pain.   Dizziness  Associated symptoms include fatigue. Pertinent negatives include no abdominal pain, arthralgias, chest pain, congestion, coughing, fever, headaches, rash or sore throat.   Fatigue  Associated symptoms include fatigue. Pertinent negatives include no abdominal pain, arthralgias, chest pain, congestion, coughing, fever, headaches, rash or sore throat.          Review of Systems   Constitutional:  Positive for fatigue. Negative for fever and unexpected weight change.   HENT:  Negative for congestion, ear discharge, ear pain, mouth sores, sinus pain and sore throat.    Eyes:  Negative for visual disturbance.   Respiratory:  Positive for chest tightness. Negative for cough and wheezing.    Cardiovascular:  Negative for chest pain, palpitations and leg swelling.   Gastrointestinal:  Positive for abdominal distention. Negative for abdominal pain, blood in stool and diarrhea.   Genitourinary:  Negative for difficulty urinating.   Musculoskeletal:  Negative for arthralgias.   Skin:  Negative for rash.   Neurological:  Positive for dizziness. Negative for headaches.   Hematological:  Does not bruise/bleed easily.   Psychiatric/Behavioral:  Negative for behavioral problems.    All other  systems reviewed and are negative.      Objective   Lab Results   Component Value Date    HGBA1C 5.9 (H) 01/18/2024      /84   Pulse 88   Temp 36.5 °C (97.7 °F)   Wt 90.8 kg (200 lb 3.2 oz)   SpO2 98%   BMI 31.36 kg/m²     Physical Exam  Vitals and nursing note reviewed.   Constitutional:       Appearance: Normal appearance. She is obese.   HENT:      Head: Normocephalic.      Nose: Nose normal.   Eyes:      Conjunctiva/sclera: Conjunctivae normal.      Pupils: Pupils are equal, round, and reactive to light.   Cardiovascular:      Rate and Rhythm: Regular rhythm.   Pulmonary:      Effort: Pulmonary effort is normal.      Breath sounds: Normal breath sounds. No stridor.   Abdominal:      General: Abdomen is flat.      Palpations: Abdomen is soft.   Musculoskeletal:      Cervical back: Neck supple.   Skin:     General: Skin is warm.   Neurological:      General: No focal deficit present.      Mental Status: She is oriented to person, place, and time.   Psychiatric:         Mood and Affect: Mood normal.         Assessment/Plan   Reanna was seen today for hyperlipidemia, dizziness, fatigue and nausea.  Diagnoses and all orders for this visit:  Chest pain, unspecified type (Primary)  Hypercholesterolemia  -     simvastatin (Zocor) 20 mg tablet; Take 1 tablet (20 mg) by mouth once daily at bedtime.  Gastroesophageal reflux disease, unspecified whether esophagitis present  Alcohol use  Vertigo  Anal fistula  Palpitations   - Patient comes today for evaluation on January 18 patient called office for perirectal swelling advised with the ER found to have perianal abscess which was drained start an antibiotic patient did not follow-up postoperatively waited until today's appointment  - Patient vague historian records reviewed from hospital admission and recent consultation  Patient on admission found to have dizziness workup for stroke was negative EKG nonspecific findings discharged home awaiting to be treated from  perianal fistula  Patient when she saw PA for preoperative clearance described angina, patient advised to have cardiac clearance patient referred to cardiology Dr. Chase scheduled to see him tomorrow  -Patient dizziness started since January 18 seen by otolaryngology awaiting to follow-up testing which patient did not arrange yet patient advised to obtain it after cardiac clearance  -Patient previous cardiac scoring 6 maximize medical management needs to follow-up cardiology for further eval  - Discussed with patient obesity and breast shadow on EKG may suggest old myocardial infarction need to confirm that by cardiac workup  - Patient described chest pressure and air bubble in the left side of her chest all the time discussed with patient possible underlying reflux disease need to proceed with upper endoscopy if cardiac clearance negative will arrange next appointment  - Hypercholesterolemia patient counseled about maximizing low-fat diet low triglyceride diet  Counseled about weight loss  Continue simvastatin  Repeat blood work in our exam in 6 months  - - Reflux disease symptoms not persistent counseled about meal size on diet controlled follow-up if no improvement.  -Obesity BMI now 31  I spent >15minutes minutes face to face with individial providing recommendations for nutrition choices and exercise plan to help achieve weight reduction.  -History of largely perianal sepsis required surgery twice and admission with IV antibiotics for 1 week and [facility] now resolved with  -Screening colonoscopy done in 2021 need to repeat in 2026  -Patient consumes large amount of beer on a weekly basis counseled about alcohol abstinence.  Counseled about reflux disease and hiatal hernia possibility patient aware will readdress symptoms again after cardiac event and surgical intervention for perianal.  -Recent ER records recent blood work plan of care discussed with patient advised on her to follow-up  postoperatively as recommended

## 2024-02-28 ENCOUNTER — DOCUMENTATION (OUTPATIENT)
Dept: CARE COORDINATION | Facility: CLINIC | Age: 64
End: 2024-02-28
Payer: COMMERCIAL

## 2024-02-28 NOTE — PROGRESS NOTES
Outreach call to patient to check in 30 days after hospital discharge to support smooth transition of care.  Patient with no additional needs noted. No additional outreach needed at this time.   alvaro

## 2024-03-12 DIAGNOSIS — K60.3: Primary | ICD-10-CM

## 2024-03-15 ENCOUNTER — LAB (OUTPATIENT)
Dept: LAB | Facility: LAB | Age: 64
End: 2024-03-15
Payer: COMMERCIAL

## 2024-03-15 DIAGNOSIS — R94.30 ABNORMAL RESULT OF CARDIOVASCULAR FUNCTION STUDY, UNSPECIFIED: Primary | ICD-10-CM

## 2024-03-15 LAB
ANION GAP SERPL CALC-SCNC: 13 MMOL/L (ref 10–20)
BUN SERPL-MCNC: 11 MG/DL (ref 6–23)
CALCIUM SERPL-MCNC: 8.9 MG/DL (ref 8.6–10.3)
CHLORIDE SERPL-SCNC: 103 MMOL/L (ref 98–107)
CO2 SERPL-SCNC: 28 MMOL/L (ref 21–32)
CREAT SERPL-MCNC: 0.77 MG/DL (ref 0.5–1.05)
EGFRCR SERPLBLD CKD-EPI 2021: 86 ML/MIN/1.73M*2
ERYTHROCYTE [DISTWIDTH] IN BLOOD BY AUTOMATED COUNT: 13.2 % (ref 11.5–14.5)
GLUCOSE SERPL-MCNC: 118 MG/DL (ref 74–99)
HCT VFR BLD AUTO: 42.9 % (ref 36–46)
HGB BLD-MCNC: 14 G/DL (ref 12–16)
MCH RBC QN AUTO: 29.9 PG (ref 26–34)
MCHC RBC AUTO-ENTMCNC: 32.6 G/DL (ref 32–36)
MCV RBC AUTO: 92 FL (ref 80–100)
NRBC BLD-RTO: 0 /100 WBCS (ref 0–0)
PLATELET # BLD AUTO: 251 X10*3/UL (ref 150–450)
POTASSIUM SERPL-SCNC: 4 MMOL/L (ref 3.5–5.3)
RBC # BLD AUTO: 4.69 X10*6/UL (ref 4–5.2)
SODIUM SERPL-SCNC: 140 MMOL/L (ref 136–145)
WBC # BLD AUTO: 7.7 X10*3/UL (ref 4.4–11.3)

## 2024-03-15 PROCEDURE — 36415 COLL VENOUS BLD VENIPUNCTURE: CPT

## 2024-03-15 PROCEDURE — 85730 THROMBOPLASTIN TIME PARTIAL: CPT

## 2024-03-15 PROCEDURE — 80048 BASIC METABOLIC PNL TOTAL CA: CPT

## 2024-03-15 PROCEDURE — 85610 PROTHROMBIN TIME: CPT

## 2024-03-15 PROCEDURE — 85027 COMPLETE CBC AUTOMATED: CPT

## 2024-03-16 LAB
APTT PPP: 34 SECONDS (ref 27–38)
INR PPP: 1 (ref 0.9–1.1)
PROTHROMBIN TIME: 11.1 SECONDS (ref 9.8–12.8)

## 2024-03-18 ENCOUNTER — APPOINTMENT (OUTPATIENT)
Dept: SURGERY | Facility: CLINIC | Age: 64
End: 2024-03-18
Payer: COMMERCIAL

## 2024-03-23 ENCOUNTER — APPOINTMENT (OUTPATIENT)
Dept: RADIOLOGY | Facility: HOSPITAL | Age: 64
DRG: 287 | End: 2024-03-23
Payer: COMMERCIAL

## 2024-03-23 ENCOUNTER — APPOINTMENT (OUTPATIENT)
Dept: CARDIOLOGY | Facility: HOSPITAL | Age: 64
DRG: 287 | End: 2024-03-23
Payer: COMMERCIAL

## 2024-03-23 ENCOUNTER — HOSPITAL ENCOUNTER (INPATIENT)
Facility: HOSPITAL | Age: 64
LOS: 2 days | Discharge: HOME | DRG: 287 | End: 2024-03-25
Attending: EMERGENCY MEDICINE | Admitting: STUDENT IN AN ORGANIZED HEALTH CARE EDUCATION/TRAINING PROGRAM
Payer: COMMERCIAL

## 2024-03-23 DIAGNOSIS — R07.89 OTHER CHEST PAIN: ICD-10-CM

## 2024-03-23 DIAGNOSIS — R94.31 T WAVE INVERSION IN EKG: ICD-10-CM

## 2024-03-23 DIAGNOSIS — R07.9 CHEST PAIN, UNSPECIFIED TYPE: Primary | ICD-10-CM

## 2024-03-23 DIAGNOSIS — K21.9 GASTROESOPHAGEAL REFLUX DISEASE WITHOUT ESOPHAGITIS: ICD-10-CM

## 2024-03-23 LAB
ALBUMIN SERPL BCP-MCNC: 4.2 G/DL (ref 3.4–5)
ALP SERPL-CCNC: 92 U/L (ref 33–136)
ALT SERPL W P-5'-P-CCNC: 25 U/L (ref 7–45)
ANION GAP SERPL CALC-SCNC: 12 MMOL/L (ref 10–20)
APPEARANCE UR: CLEAR
AST SERPL W P-5'-P-CCNC: 18 U/L (ref 9–39)
BASOPHILS # BLD AUTO: 0.02 X10*3/UL (ref 0–0.1)
BASOPHILS NFR BLD AUTO: 0.3 %
BILIRUB SERPL-MCNC: 0.9 MG/DL (ref 0–1.2)
BILIRUB UR STRIP.AUTO-MCNC: NEGATIVE MG/DL
BNP SERPL-MCNC: 30 PG/ML (ref 0–99)
BUN SERPL-MCNC: 14 MG/DL (ref 6–23)
CALCIUM SERPL-MCNC: 9.1 MG/DL (ref 8.6–10.3)
CARDIAC TROPONIN I PNL SERPL HS: <3 NG/L (ref 0–13)
CHLORIDE SERPL-SCNC: 101 MMOL/L (ref 98–107)
CO2 SERPL-SCNC: 30 MMOL/L (ref 21–32)
COLOR UR: NORMAL
CREAT SERPL-MCNC: 0.72 MG/DL (ref 0.5–1.05)
EGFRCR SERPLBLD CKD-EPI 2021: >90 ML/MIN/1.73M*2
EOSINOPHIL # BLD AUTO: 0.11 X10*3/UL (ref 0–0.7)
EOSINOPHIL NFR BLD AUTO: 1.7 %
ERYTHROCYTE [DISTWIDTH] IN BLOOD BY AUTOMATED COUNT: 13.1 % (ref 11.5–14.5)
GLUCOSE SERPL-MCNC: 106 MG/DL (ref 74–99)
GLUCOSE UR STRIP.AUTO-MCNC: NEGATIVE MG/DL
HCT VFR BLD AUTO: 45.4 % (ref 36–46)
HGB BLD-MCNC: 14.6 G/DL (ref 12–16)
IMM GRANULOCYTES # BLD AUTO: 0.01 X10*3/UL (ref 0–0.7)
IMM GRANULOCYTES NFR BLD AUTO: 0.2 % (ref 0–0.9)
KETONES UR STRIP.AUTO-MCNC: NEGATIVE MG/DL
LEUKOCYTE ESTERASE UR QL STRIP.AUTO: NEGATIVE
LIPASE SERPL-CCNC: 19 U/L (ref 9–82)
LYMPHOCYTES # BLD AUTO: 1.77 X10*3/UL (ref 1.2–4.8)
LYMPHOCYTES NFR BLD AUTO: 27.5 %
MAGNESIUM SERPL-MCNC: 2.28 MG/DL (ref 1.6–2.4)
MCH RBC QN AUTO: 29.7 PG (ref 26–34)
MCHC RBC AUTO-ENTMCNC: 32.2 G/DL (ref 32–36)
MCV RBC AUTO: 92 FL (ref 80–100)
MONOCYTES # BLD AUTO: 0.42 X10*3/UL (ref 0.1–1)
MONOCYTES NFR BLD AUTO: 6.5 %
NEUTROPHILS # BLD AUTO: 4.11 X10*3/UL (ref 1.2–7.7)
NEUTROPHILS NFR BLD AUTO: 63.8 %
NITRITE UR QL STRIP.AUTO: NEGATIVE
NRBC BLD-RTO: 0 /100 WBCS (ref 0–0)
PH UR STRIP.AUTO: 6 [PH]
PLATELET # BLD AUTO: 245 X10*3/UL (ref 150–450)
POTASSIUM SERPL-SCNC: 4 MMOL/L (ref 3.5–5.3)
PROT SERPL-MCNC: 7.3 G/DL (ref 6.4–8.2)
PROT UR STRIP.AUTO-MCNC: NEGATIVE MG/DL
RBC # BLD AUTO: 4.92 X10*6/UL (ref 4–5.2)
RBC # UR STRIP.AUTO: NEGATIVE /UL
SODIUM SERPL-SCNC: 139 MMOL/L (ref 136–145)
SP GR UR STRIP.AUTO: 1
UFH PPP CHRO-ACNC: 0.5 IU/ML
UROBILINOGEN UR STRIP.AUTO-MCNC: <2 MG/DL
WBC # BLD AUTO: 6.4 X10*3/UL (ref 4.4–11.3)

## 2024-03-23 PROCEDURE — 74174 CTA ABD&PLVS W/CONTRAST: CPT

## 2024-03-23 PROCEDURE — 71045 X-RAY EXAM CHEST 1 VIEW: CPT

## 2024-03-23 PROCEDURE — 83880 ASSAY OF NATRIURETIC PEPTIDE: CPT | Performed by: STUDENT IN AN ORGANIZED HEALTH CARE EDUCATION/TRAINING PROGRAM

## 2024-03-23 PROCEDURE — 96374 THER/PROPH/DIAG INJ IV PUSH: CPT

## 2024-03-23 PROCEDURE — 71275 CT ANGIOGRAPHY CHEST: CPT

## 2024-03-23 PROCEDURE — 83735 ASSAY OF MAGNESIUM: CPT | Performed by: EMERGENCY MEDICINE

## 2024-03-23 PROCEDURE — 83690 ASSAY OF LIPASE: CPT | Performed by: EMERGENCY MEDICINE

## 2024-03-23 PROCEDURE — 85025 COMPLETE CBC W/AUTO DIFF WBC: CPT | Performed by: EMERGENCY MEDICINE

## 2024-03-23 PROCEDURE — 84484 ASSAY OF TROPONIN QUANT: CPT | Performed by: EMERGENCY MEDICINE

## 2024-03-23 PROCEDURE — 2500000001 HC RX 250 WO HCPCS SELF ADMINISTERED DRUGS (ALT 637 FOR MEDICARE OP)

## 2024-03-23 PROCEDURE — 2550000001 HC RX 255 CONTRASTS: Performed by: STUDENT IN AN ORGANIZED HEALTH CARE EDUCATION/TRAINING PROGRAM

## 2024-03-23 PROCEDURE — 81003 URINALYSIS AUTO W/O SCOPE: CPT | Performed by: EMERGENCY MEDICINE

## 2024-03-23 PROCEDURE — 2500000004 HC RX 250 GENERAL PHARMACY W/ HCPCS (ALT 636 FOR OP/ED): Performed by: STUDENT IN AN ORGANIZED HEALTH CARE EDUCATION/TRAINING PROGRAM

## 2024-03-23 PROCEDURE — 99223 1ST HOSP IP/OBS HIGH 75: CPT | Performed by: STUDENT IN AN ORGANIZED HEALTH CARE EDUCATION/TRAINING PROGRAM

## 2024-03-23 PROCEDURE — 71045 X-RAY EXAM CHEST 1 VIEW: CPT | Mod: FOREIGN READ | Performed by: RADIOLOGY

## 2024-03-23 PROCEDURE — 80053 COMPREHEN METABOLIC PANEL: CPT | Performed by: EMERGENCY MEDICINE

## 2024-03-23 PROCEDURE — 36415 COLL VENOUS BLD VENIPUNCTURE: CPT | Performed by: EMERGENCY MEDICINE

## 2024-03-23 PROCEDURE — 84484 ASSAY OF TROPONIN QUANT: CPT | Performed by: STUDENT IN AN ORGANIZED HEALTH CARE EDUCATION/TRAINING PROGRAM

## 2024-03-23 PROCEDURE — 36415 COLL VENOUS BLD VENIPUNCTURE: CPT | Performed by: STUDENT IN AN ORGANIZED HEALTH CARE EDUCATION/TRAINING PROGRAM

## 2024-03-23 PROCEDURE — 85520 HEPARIN ASSAY: CPT | Performed by: STUDENT IN AN ORGANIZED HEALTH CARE EDUCATION/TRAINING PROGRAM

## 2024-03-23 PROCEDURE — 87086 URINE CULTURE/COLONY COUNT: CPT | Mod: GEALAB | Performed by: EMERGENCY MEDICINE

## 2024-03-23 PROCEDURE — 93005 ELECTROCARDIOGRAM TRACING: CPT

## 2024-03-23 PROCEDURE — 1200000002 HC GENERAL ROOM WITH TELEMETRY DAILY

## 2024-03-23 PROCEDURE — 99285 EMERGENCY DEPT VISIT HI MDM: CPT | Mod: 25

## 2024-03-23 RX ORDER — HEPARIN SODIUM 10000 [USP'U]/100ML
0-4000 INJECTION, SOLUTION INTRAVENOUS CONTINUOUS
Status: DISCONTINUED | OUTPATIENT
Start: 2024-03-23 | End: 2024-03-25

## 2024-03-23 RX ORDER — ENOXAPARIN SODIUM 100 MG/ML
40 INJECTION SUBCUTANEOUS EVERY 24 HOURS
Status: DISCONTINUED | OUTPATIENT
Start: 2024-03-23 | End: 2024-03-23

## 2024-03-23 RX ORDER — HEPARIN SODIUM 5000 [USP'U]/ML
4000 INJECTION, SOLUTION INTRAVENOUS; SUBCUTANEOUS ONCE
Status: COMPLETED | OUTPATIENT
Start: 2024-03-23 | End: 2024-03-23

## 2024-03-23 RX ORDER — ATORVASTATIN CALCIUM 40 MG/1
40 TABLET, FILM COATED ORAL NIGHTLY
Status: DISCONTINUED | OUTPATIENT
Start: 2024-03-23 | End: 2024-03-23

## 2024-03-23 RX ORDER — ATORVASTATIN CALCIUM 80 MG/1
80 TABLET, FILM COATED ORAL NIGHTLY
Status: DISCONTINUED | OUTPATIENT
Start: 2024-03-23 | End: 2024-03-25 | Stop reason: HOSPADM

## 2024-03-23 RX ORDER — FAMOTIDINE 10 MG/ML
40 INJECTION INTRAVENOUS ONCE
Status: COMPLETED | OUTPATIENT
Start: 2024-03-23 | End: 2024-03-23

## 2024-03-23 RX ORDER — NAPROXEN SODIUM 220 MG/1
81 TABLET, FILM COATED ORAL DAILY
Status: DISCONTINUED | OUTPATIENT
Start: 2024-03-23 | End: 2024-03-25 | Stop reason: HOSPADM

## 2024-03-23 RX ORDER — ASPIRIN 81 MG/1
81 TABLET ORAL EVERY OTHER DAY
Status: CANCELLED | OUTPATIENT
Start: 2024-03-24

## 2024-03-23 RX ORDER — HEPARIN SODIUM 5000 [USP'U]/ML
2000-4000 INJECTION, SOLUTION INTRAVENOUS; SUBCUTANEOUS EVERY 4 HOURS PRN
Status: DISCONTINUED | OUTPATIENT
Start: 2024-03-23 | End: 2024-03-25

## 2024-03-23 RX ADMIN — HEPARIN SODIUM 1000 UNITS/HR: 10000 INJECTION, SOLUTION INTRAVENOUS at 17:07

## 2024-03-23 RX ADMIN — IOHEXOL 90 ML: 350 INJECTION, SOLUTION INTRAVENOUS at 13:06

## 2024-03-23 RX ADMIN — FAMOTIDINE 40 MG: 10 INJECTION, SOLUTION INTRAVENOUS at 14:31

## 2024-03-23 RX ADMIN — HEPARIN SODIUM 4000 UNITS: 5000 INJECTION INTRAVENOUS; SUBCUTANEOUS at 17:11

## 2024-03-23 RX ADMIN — ATORVASTATIN CALCIUM 80 MG: 80 TABLET, FILM COATED ORAL at 20:41

## 2024-03-23 RX ADMIN — ASPIRIN 81 MG CHEWABLE TABLET 81 MG: 81 TABLET CHEWABLE at 17:31

## 2024-03-23 SDOH — SOCIAL STABILITY: SOCIAL INSECURITY: HAVE YOU HAD THOUGHTS OF HARMING ANYONE ELSE?: NO

## 2024-03-23 SDOH — SOCIAL STABILITY: SOCIAL INSECURITY: DO YOU FEEL ANYONE HAS EXPLOITED OR TAKEN ADVANTAGE OF YOU FINANCIALLY OR OF YOUR PERSONAL PROPERTY?: NO

## 2024-03-23 SDOH — SOCIAL STABILITY: SOCIAL INSECURITY: ARE YOU OR HAVE YOU BEEN THREATENED OR ABUSED PHYSICALLY, EMOTIONALLY, OR SEXUALLY BY ANYONE?: NO

## 2024-03-23 SDOH — SOCIAL STABILITY: SOCIAL INSECURITY: HAS ANYONE EVER THREATENED TO HURT YOUR FAMILY OR YOUR PETS?: NO

## 2024-03-23 SDOH — SOCIAL STABILITY: SOCIAL INSECURITY: DO YOU FEEL UNSAFE GOING BACK TO THE PLACE WHERE YOU ARE LIVING?: NO

## 2024-03-23 SDOH — SOCIAL STABILITY: SOCIAL INSECURITY: DOES ANYONE TRY TO KEEP YOU FROM HAVING/CONTACTING OTHER FRIENDS OR DOING THINGS OUTSIDE YOUR HOME?: NO

## 2024-03-23 SDOH — SOCIAL STABILITY: SOCIAL INSECURITY: ABUSE: ADULT

## 2024-03-23 SDOH — SOCIAL STABILITY: SOCIAL INSECURITY: ARE THERE ANY APPARENT SIGNS OF INJURIES/BEHAVIORS THAT COULD BE RELATED TO ABUSE/NEGLECT?: NO

## 2024-03-23 ASSESSMENT — ENCOUNTER SYMPTOMS
HEADACHES: 0
LIGHT-HEADEDNESS: 0
ABDOMINAL PAIN: 0
CHEST TIGHTNESS: 1
NAUSEA: 0
ABDOMINAL DISTENTION: 1
COUGH: 0
CHILLS: 0
POLYPHAGIA: 0
ACTIVITY CHANGE: 1
APPETITE CHANGE: 0
PALPITATIONS: 0
SHORTNESS OF BREATH: 0
ARTHRALGIAS: 0
COLOR CHANGE: 0
JOINT SWELLING: 0
DIARRHEA: 0
BLOOD IN STOOL: 0
NECK PAIN: 0
ACTIVITY CHANGE: 0
NECK STIFFNESS: 0
FATIGUE: 0
WHEEZING: 0
MYALGIAS: 0
DIZZINESS: 0
VOMITING: 0
APNEA: 0
FEVER: 0
POLYDIPSIA: 0
FREQUENCY: 0
BACK PAIN: 0
STRIDOR: 0

## 2024-03-23 ASSESSMENT — PATIENT HEALTH QUESTIONNAIRE - PHQ9
2. FEELING DOWN, DEPRESSED OR HOPELESS: NOT AT ALL
SUM OF ALL RESPONSES TO PHQ9 QUESTIONS 1 & 2: 0
1. LITTLE INTEREST OR PLEASURE IN DOING THINGS: NOT AT ALL

## 2024-03-23 ASSESSMENT — COGNITIVE AND FUNCTIONAL STATUS - GENERAL
MOBILITY SCORE: 23
MOBILITY SCORE: 24
CLIMB 3 TO 5 STEPS WITH RAILING: A LITTLE
MOBILITY SCORE: 24
PATIENT BASELINE BEDBOUND: NO
DAILY ACTIVITIY SCORE: 24

## 2024-03-23 ASSESSMENT — COLUMBIA-SUICIDE SEVERITY RATING SCALE - C-SSRS
2. HAVE YOU ACTUALLY HAD ANY THOUGHTS OF KILLING YOURSELF?: NO
1. IN THE PAST MONTH, HAVE YOU WISHED YOU WERE DEAD OR WISHED YOU COULD GO TO SLEEP AND NOT WAKE UP?: NO
6. HAVE YOU EVER DONE ANYTHING, STARTED TO DO ANYTHING, OR PREPARED TO DO ANYTHING TO END YOUR LIFE?: NO

## 2024-03-23 ASSESSMENT — ACTIVITIES OF DAILY LIVING (ADL)
PATIENT'S MEMORY ADEQUATE TO SAFELY COMPLETE DAILY ACTIVITIES?: YES
GROOMING: INDEPENDENT
DRESSING YOURSELF: INDEPENDENT
WALKS IN HOME: INDEPENDENT
HEARING - RIGHT EAR: FUNCTIONAL
HEARING - LEFT EAR: FUNCTIONAL
JUDGMENT_ADEQUATE_SAFELY_COMPLETE_DAILY_ACTIVITIES: YES
ADEQUATE_TO_COMPLETE_ADL: YES
BATHING: INDEPENDENT
ASSISTIVE_DEVICE: EYEGLASSES
LACK_OF_TRANSPORTATION: NO
FEEDING YOURSELF: INDEPENDENT
TOILETING: INDEPENDENT

## 2024-03-23 ASSESSMENT — PAIN DESCRIPTION - DESCRIPTORS
DESCRIPTORS: ACHING
DESCRIPTORS: ACHING

## 2024-03-23 ASSESSMENT — LIFESTYLE VARIABLES
SKIP TO QUESTIONS 9-10: 0
AUDIT-C TOTAL SCORE: 8
HOW OFTEN DO YOU HAVE 6 OR MORE DRINKS ON ONE OCCASION: WEEKLY
HAVE PEOPLE ANNOYED YOU BY CRITICIZING YOUR DRINKING: NO
EVER HAD A DRINK FIRST THING IN THE MORNING TO STEADY YOUR NERVES TO GET RID OF A HANGOVER: NO
EVER FELT BAD OR GUILTY ABOUT YOUR DRINKING: NO
HAVE YOU EVER FELT YOU SHOULD CUT DOWN ON YOUR DRINKING: NO
AUDIT-C TOTAL SCORE: 8
HOW MANY STANDARD DRINKS CONTAINING ALCOHOL DO YOU HAVE ON A TYPICAL DAY: 5 OR 6
TOTAL SCORE: 0
HOW OFTEN DO YOU HAVE A DRINK CONTAINING ALCOHOL: 2-3 TIMES A WEEK

## 2024-03-23 ASSESSMENT — PAIN SCALES - GENERAL
PAINLEVEL_OUTOF10: 5 - MODERATE PAIN
PAINLEVEL_OUTOF10: 5 - MODERATE PAIN
PAINLEVEL_OUTOF10: 0 - NO PAIN

## 2024-03-23 ASSESSMENT — PAIN DESCRIPTION - LOCATION: LOCATION: CHEST

## 2024-03-23 ASSESSMENT — PAIN DESCRIPTION - ORIENTATION: ORIENTATION: LEFT

## 2024-03-23 ASSESSMENT — PAIN - FUNCTIONAL ASSESSMENT
PAIN_FUNCTIONAL_ASSESSMENT: 0-10
PAIN_FUNCTIONAL_ASSESSMENT: 0-10

## 2024-03-23 ASSESSMENT — PAIN DESCRIPTION - PAIN TYPE: TYPE: ACUTE PAIN

## 2024-03-23 NOTE — H&P
History and Physical Note  Patient: Reanna Pineda   Age: 63 y.o. Gender: female     History of Present Illness:   Admission Reason:   Chief Complaint   Patient presents with    Chest Pain     HPI:   Reanna Pineda is a 63 y.o. year old female with PMH HLD presented to the ED for worsening chest pain. She has been having CP since beginning of month. She has been following with her cardiologist and had a positive stress test in the office. She was suppose to get [facility] this upcoming week. She had worsening intermittent cp that she describes as chest tightness in left anterior chest that radiates down her left arm and up to jaw. Her hand went numb several times today. Denies fever, chills, abdominal pain, N/V/D, recent illness, leg swelling. In the ED, there were ekg changes. Hospitalist is contacted for admission.     Review of Systems:  Review of Systems   Constitutional:  Positive for activity change. Negative for appetite change, chills and fatigue.   Respiratory:  Negative for shortness of breath.    Cardiovascular:  Positive for chest pain.   Gastrointestinal:  Negative for abdominal pain, nausea and vomiting.   Neurological:  Negative for dizziness and headaches.   All other systems reviewed and are negative.      Allergies:   Allergies   Allergen Reactions    Percocet [Oxycodone-Acetaminophen] GI Upset and Nausea/vomiting    Other Other     POSSIBLE PCN ALLERGY as child       Past Medical History:  Past Medical History:   Diagnosis Date    Adverse effect of anesthesia     COMBATIVE X 1 AFTER    Angina pectoris (CMS/HCC)     Arthritis     Awareness under anesthesia     during colonoscopy    Bronchitis     GERD (gastroesophageal reflux disease)     High cholesterol        Past Surgical History:   Past Surgical History:   Procedure Laterality Date    ABSCESS DRAINAGE  2021    CHOLECYSTECTOMY      COLONOSCOPY  2021    ENDOMETRIAL ABLATION      INCISION AND DRAINAGE PERIRECTAL ABSCESS  2021    X 2       Family  "History:  Family History   Problem Relation Name Age of Onset    Other (cardiac problems) Mother      Diabetes Mother      Hypertension Mother      Cancer Mother      Other (cardiac problems) Father      Diabetes Father      Hypertension Father      Cancer Father         Social History:  Social History     Tobacco Use   Smoking Status Never   Smokeless Tobacco Never       Social History     Substance and Sexual Activity   Alcohol Use Yes    Alcohol/week: 6.0 standard drinks of alcohol    Types: 6 Standard drinks or equivalent per week    Comment: STATES CUT BACK ON USE       Social History     Substance and Sexual Activity   Drug Use Never       Home Medications:  Current Outpatient Medications   Medication Instructions    ascorbic acid (VITAMIN C) 500 mg, oral, Daily    aspirin 81 mg, oral, Every other day    cholecalciferol (VITAMIN D-3) 25 mcg, oral, Daily    cyanocobalamin (VITAMIN B-12) 100 mcg, oral, Daily    ibuprofen 800 mg, oral, Every 8 hours PRN    simvastatin (ZOCOR) 20 mg, oral, Nightly    vitamin E 400 Units, oral, Daily       Vitals:  Visit Vitals  BP (!) 116/97 (BP Location: Left arm, Patient Position: Sitting)   Pulse 84   Temp 36.8 °C (98.2 °F) (Temporal)   Resp 20   Ht 1.702 m (5' 7\")   Wt 90.7 kg (200 lb)   SpO2 97%   BMI 31.32 kg/m²   OB Status Postmenopausal   Smoking Status Never   BSA 2.07 m²       Physical Exam  Vitals and nursing note reviewed.   Constitutional:       General: She is not in acute distress.     Appearance: Normal appearance. She is not ill-appearing or toxic-appearing.   HENT:      Head: Normocephalic and atraumatic.      Mouth/Throat:      Mouth: Mucous membranes are moist.   Eyes:      Extraocular Movements: Extraocular movements intact.      Conjunctiva/sclera: Conjunctivae normal.      Pupils: Pupils are equal, round, and reactive to light.   Cardiovascular:      Rate and Rhythm: Normal rate and regular rhythm.      Heart sounds: No murmur heard.     No gallop. "   Pulmonary:      Effort: Pulmonary effort is normal. No respiratory distress.      Breath sounds: Normal breath sounds. No wheezing, rhonchi or rales.   Abdominal:      General: Abdomen is flat. Bowel sounds are normal. There is no distension.      Palpations: Abdomen is soft. There is no mass.      Tenderness: There is no abdominal tenderness.   Musculoskeletal:         General: No swelling or tenderness. Normal range of motion.      Cervical back: Normal range of motion and neck supple.   Skin:     General: Skin is warm and dry.   Neurological:      General: No focal deficit present.      Mental Status: She is alert and oriented to person, place, and time. Mental status is at baseline.   Psychiatric:         Mood and Affect: Mood normal.         Behavior: Behavior normal.         Thought Content: Thought content normal.         Judgment: Judgment normal.         Labs and Imaging Results:  Lab Results   Component Value Date    WBC 6.4 03/23/2024       XR chest 1 view  Narrative: STUDY:  Chest Radiograph;  3/23/2024 12:35 PM  INDICATION:  Chest pain.  COMPARISON:  CT AP 1/18/2024.  ACCESSION NUMBER(S):  [id number]  ORDERING CLINICIAN:  LAVON GARCIA  TECHNIQUE:  Frontal chest was obtained at 12:34 hours.  FINDINGS:  CARDIOMEDIASTINAL SILHOUETTE:  Cardiomediastinal silhouette is normal in size and configuration.     LUNGS:  Lungs are clear.     ABDOMEN:  No remarkable upper abdominal findings.     BONES:  No acute osseous changes.  Impression: No acute cardiopulmonary disease..  Signed by Darrell Lopez MD  CT angio chest abdomen pelvis  Narrative: Interpreted By:  Grisel Ryan,   STUDY:  CT ANGIO CHEST ABDOMEN PELVIS; ;  3/23/2024 1:05 pm      INDICATION:  Signs/Symptoms:c/f dissection. c/o L cp with numbness down L UE.      COMPARISON:  CT abdomen pelvis 01/18/2024      ACCESSION NUMBER(S):  [id number]      ORDERING CLINICIAN:  LAVON GARCIA      TECHNIQUE:  Imaging was performed from thoracic apex through  ischial tuberosities  prior to and following intravenous administration of 90 mL Omnipaque  350. Postcontrast images or timed for arterial opacification. Multi  planer reconstructions were generated at a separate workstation 3D  and MIP reconstructions were generated.      FINDINGS:  Minimal athero sclerotic calcification is present in the aortic arch  with no other vascular calcification noted in the length of the  aorta. There is no aneurysmal dilatation in the aorta,  brachiocephalic vessels or iliac arteries. Postcontrast there is no  dissection identified in the aorta and there is no focal  extravasation of opacify blood.      Brachiocephalic vessels originate from the aorta in typical fashion  and are widely patent. With regard to the left subclavian artery  there is no stenosis or evidence of dissection. Celiac and superior  mesenteric arteries are widely patent. There are single renal  arteries bilaterally which are widely patent. Inferior mesenteric  artery and iliac arteries are patent.      Main pulmonary artery is normal in size. Heart is normal in size.  Mild coronary calcification is noted, particularly in the LAD. There  is no pericardial effusion.      Trachea and mainstem bronchi are patent with no endoluminal mass.  Lungs are clear. No pleural effusion is present.      Visible portion of the thyroid appears normal. There is no  mediastinal or hilar lymphadenopathy.      Esophagus appears normal.      No mass is noted in the liver. There is no intra or extrahepatic  biliary dilatation. Gallbladder is surgically absent.      No mass or inflammation is noted involving the pancreas.      Spleen is normal in size with no focal mass noted.      Adrenal glands appear normal. Kidneys enhance symmetrically with no  hydronephrosis noted. Mid posterior left kidney has a 2 cm cyst. No  suspicious renal mass is detected      Bowel loops are nondilated. Appendix appears normal. No ascites,  pneumoperitoneum or  mesenteric inflammation is identified.      Vena cava is normal in size. There are no pathologically enlarged  retroperitoneal, iliac or inguinal lymph nodes identified.      Urinary bladder is normal in appearance with no wall thickening.      Uterus is nonenlarged      No abdominal wall hernia is identified.      Degenerative endplate hypertrophy in the spine is relatively mild.  More moderate partially bridging spurs are present in the lower  thoracic spine. No suspicious osseous lesions are noted.      Impression: There is no aneurysm or dissection of the aorta. Left subclavian  artery is widely patent.      No acute abnormality is noted in the chest, abdomen or pelvis.          MACRO:  None.      Signed by: Grisel Ryan 3/23/2024 1:28 PM  Dictation workstation:   [id number]      Assessment and Plan:    Principal Problem:    Chest pain, unspecified type      Patient Active Problem List   Diagnosis    Hypercholesterolemia    Anusitis    Anal fistula    Chest pain, unspecified type     Chest pain high suspicious for ACS  Pt has recent positive stress test, now ekg changes  - Admit to tele  - cardio consult  - statin/ASA  - hep gtt  - cycle trops    Diet: cardiac  DVT ppx: lovenox  Code status: Full, confirmed with pt   Dispo: admit under inpatient status anticipating greater than forty-eight hours or two midnights stay.      Kamala Bello MD  Hospitalist

## 2024-03-23 NOTE — ED NOTES
Pt arrives with c/o left sided chest pain radiating to left arm beginning this AM. Pt was recently seen at [facility] for same and d/c'd home. Pt is scheduled for cardiac catheterization next week. Pt instructed to come to the ED for worsening symptoms by Dr. Chase.      Wilson Key, RN  03/23/24 0106

## 2024-03-23 NOTE — H&P (VIEW-ONLY)
Inpatient consult to Cardiology  Consult performed by: SHEREE Mckenzie-CNP  Consult ordered by: Kamala Bello MD  Reason for consult: chest pain          Reason For Consult  Chest pain    History Of Present Illness  Reanna Pineda is a 63 y.o. female with a past medical history of chest pain and hyperlipidemia, dizziness, obesity, and perianal sepsis/ abscess rupture, GERD, arthritis, and bronchitis that presented to the ER complaining of chest pain.     Reanna was see in our office complaining of chest pain, bubble sensation in the center of her chest, and dizziness. An echocardiogram and stress test were performed, the stress test was concerning for ischemia. She was scheduled to have the LHC performed this Wednesday with Dr. Chase. She reports this morning she woke up with the left chest pain, bubble sensation and new onset of left hand/ arm numbness and left jaw pain that was sharp and stabbing. She presented to the ER concerned about the change. 12 lead EKG was reviewed and showed T wave inversions in inferior leads, and V3-V6.   She was admitted for further work up and to have a LHC performed.      Past Medical History  She has a past medical history of Adverse effect of anesthesia, Angina pectoris (CMS/HCC), Arthritis, Awareness under anesthesia, Bronchitis, GERD (gastroesophageal reflux disease), and High cholesterol.    Surgical History  She has a past surgical history that includes Endometrial ablation; Cholecystectomy; Abscess drainage (2021); Colonoscopy (2021); and Incision and drainage perirectal abscess (2021).     Social History  She reports that she has never smoked. She has never used smokeless tobacco. She reports current alcohol use of about 6.0 standard drinks of alcohol per week. She reports that she does not use drugs.    Family History  Family History   Problem Relation Name Age of Onset    Other (cardiac problems) Mother      Diabetes Mother      Hypertension Mother      Cancer  Mother      Other (cardiac problems) Father      Diabetes Father      Hypertension Father      Cancer Father          Allergies  Percocet [oxycodone-acetaminophen] and Other    Review of Systems  Review of Systems   Constitutional:  Negative for activity change, appetite change, chills, fatigue and fever.   HENT:  Negative for congestion.    Respiratory:  Positive for chest tightness. Negative for apnea, cough, shortness of breath, wheezing and stridor.    Cardiovascular:  Positive for chest pain. Negative for palpitations and leg swelling.   Gastrointestinal:  Positive for abdominal distention. Negative for abdominal pain, blood in stool, diarrhea, nausea and vomiting.   Endocrine: Negative for cold intolerance, heat intolerance, polydipsia, polyphagia and polyuria.   Genitourinary:  Negative for frequency and urgency.   Musculoskeletal:  Negative for arthralgias, back pain, gait problem, joint swelling, myalgias, neck pain and neck stiffness.   Skin:  Negative for color change and pallor.   Neurological:  Negative for dizziness and light-headedness.   Psychiatric/Behavioral:  Negative for behavioral problems.          Physical Exam  Physical Exam  Constitutional:       Appearance: Normal appearance.   HENT:      Head: Normocephalic and atraumatic.      Nose: Nose normal. No congestion.   Eyes:      Extraocular Movements: Extraocular movements intact.      Pupils: Pupils are equal, round, and reactive to light.   Cardiovascular:      Rate and Rhythm: Normal rate and regular rhythm.      Pulses: Normal pulses.      Heart sounds: Normal heart sounds. No murmur heard.     No friction rub. No gallop.   Pulmonary:      Effort: Pulmonary effort is normal. No tachypnea, bradypnea, accessory muscle usage or respiratory distress.      Breath sounds: Normal breath sounds. No stridor. No wheezing, rhonchi or rales.   Chest:      Chest wall: No tenderness.   Abdominal:      General: Bowel sounds are normal. There is no  distension.      Palpations: Abdomen is soft.      Tenderness: There is no abdominal tenderness.   Musculoskeletal:         General: Normal range of motion.      Cervical back: Normal range of motion and neck supple.   Skin:     General: Skin is warm and dry.      Capillary Refill: Capillary refill takes less than 2 seconds.   Neurological:      General: No focal deficit present.      Mental Status: She is alert and oriented to person, place, and time. Mental status is at baseline.   Psychiatric:         Mood and Affect: Mood normal.         Behavior: Behavior normal.             Last Recorded Vitals  BP (!) 116/97 (BP Location: Left arm, Patient Position: Sitting)   Pulse 84   Temp 36.8 °C (98.2 °F) (Temporal)   Resp 20   Wt 90.7 kg (200 lb)   SpO2 97%     Relevant Results  Results for orders placed or performed during the hospital encounter of 03/23/24 (from the past 24 hour(s))   CBC and Auto Differential   Result Value Ref Range    WBC 6.4 4.4 - 11.3 x10*3/uL    nRBC 0.0 0.0 - 0.0 /100 WBCs    RBC 4.92 4.00 - 5.20 x10*6/uL    Hemoglobin 14.6 12.0 - 16.0 g/dL    Hematocrit 45.4 36.0 - 46.0 %    MCV 92 80 - 100 fL    MCH 29.7 26.0 - 34.0 pg    MCHC 32.2 32.0 - 36.0 g/dL    RDW 13.1 11.5 - 14.5 %    Platelets 245 150 - 450 x10*3/uL    Neutrophils % 63.8 40.0 - 80.0 %    Immature Granulocytes %, Automated 0.2 0.0 - 0.9 %    Lymphocytes % 27.5 13.0 - 44.0 %    Monocytes % 6.5 2.0 - 10.0 %    Eosinophils % 1.7 0.0 - 6.0 %    Basophils % 0.3 0.0 - 2.0 %    Neutrophils Absolute 4.11 1.20 - 7.70 x10*3/uL    Immature Granulocytes Absolute, Automated 0.01 0.00 - 0.70 x10*3/uL    Lymphocytes Absolute 1.77 1.20 - 4.80 x10*3/uL    Monocytes Absolute 0.42 0.10 - 1.00 x10*3/uL    Eosinophils Absolute 0.11 0.00 - 0.70 x10*3/uL    Basophils Absolute 0.02 0.00 - 0.10 x10*3/uL   Comprehensive metabolic panel   Result Value Ref Range    Glucose 106 (H) 74 - 99 mg/dL    Sodium 139 136 - 145 mmol/L    Potassium 4.0 3.5 - 5.3  mmol/L    Chloride 101 98 - 107 mmol/L    Bicarbonate 30 21 - 32 mmol/L    Anion Gap 12 10 - 20 mmol/L    Urea Nitrogen 14 6 - 23 mg/dL    Creatinine 0.72 0.50 - 1.05 mg/dL    eGFR >90 >60 mL/min/1.73m*2    Calcium 9.1 8.6 - 10.3 mg/dL    Albumin 4.2 3.4 - 5.0 g/dL    Alkaline Phosphatase 92 33 - 136 U/L    Total Protein 7.3 6.4 - 8.2 g/dL    AST 18 9 - 39 U/L    Bilirubin, Total 0.9 0.0 - 1.2 mg/dL    ALT 25 7 - 45 U/L   Lipase   Result Value Ref Range    Lipase 19 9 - 82 U/L   Magnesium   Result Value Ref Range    Magnesium 2.28 1.60 - 2.40 mg/dL   Urinalysis with Reflex Microscopic   Result Value Ref Range    Color, Urine Straw Straw, Yellow    Appearance, Urine Clear Clear    Specific Gravity, Urine 1.005 1.005 - 1.035    pH, Urine 6.0 5.0, 5.5, 6.0, 6.5, 7.0, 7.5, 8.0    Protein, Urine NEGATIVE NEGATIVE mg/dL    Glucose, Urine NEGATIVE NEGATIVE mg/dL    Blood, Urine NEGATIVE NEGATIVE    Ketones, Urine NEGATIVE NEGATIVE mg/dL    Bilirubin, Urine NEGATIVE NEGATIVE    Urobilinogen, Urine <2.0 <2.0 mg/dL    Nitrite, Urine NEGATIVE NEGATIVE    Leukocyte Esterase, Urine NEGATIVE NEGATIVE   Troponin I, High Sensitivity, Initial   Result Value Ref Range    Troponin I, High Sensitivity <3 0 - 13 ng/L   B-type natriuretic peptide   Result Value Ref Range    BNP 30 0 - 99 pg/mL   Troponin, High Sensitivity, 1 Hour   Result Value Ref Range    Troponin I, High Sensitivity <3 0 - 13 ng/L        === 03/23/24 ===    XR CHEST 1 VIEW    - Impression -  No acute cardiopulmonary disease..  Signed by Darrell Lopez MD     No echocardiogram results found for the past 12 months     Patient Active Problem List   Diagnosis    Hypercholesterolemia    Anusitis    Anal fistula    Chest pain, unspecified type    T wave inversion in EKG          Assessment/Plan     3/6/2024 Stress Test  Equivocal study for mild inferolateral and septal ischemia, normal LV systolic function. Normal functional capacity, no angina or arrhythmias or ischemic ECG  changes.    3/5/2024 Echocardiogram  LVEF 60%. Normal valvular structure and function. Abnormal left ventricular relaxation. Unable to estimate pulmonary arterial systolic pressure. Normal estimated CVP.    Chest Pain  -Troponin negative  -I reviewed the 12 lead EKG, T wave inversions in   V3-V6, and inferior leads.   -CXR  negative  -I reviewed the echocardiogram from above  -Stress test concerning for ischemia, [facility] was previously scheduled for this Wednesday outpt  - Plan for [facility] on Monday  - Start aspirin, continue atorvastatin  - Check fasting lipid panel  - Start heparin gtt    Hyperlipidemia  - Continue atorvastatin  - Lipids reviewed, , Chol: 216, T    Dizziness  - monitor on tele  - outpt ambulatory monitor  - MRI/MRA head/ neck reviewed from 24 and were negative for significant intracranial stenosis or aneurysm    SHEREE Mckenzie-CNP

## 2024-03-23 NOTE — Clinical Note
Accessed site: right radial artery.   Ultrasound guidance was used. Access needle removed unable to access radial artery, manual pressure applied

## 2024-03-23 NOTE — ED PROVIDER NOTES
HPI:  Reanna Pineda is a 63 y.o. with a history of hyperlipidemia, GERD, CAD, presents to the emergency department with concern for chest pain.  She endorses a discomfort in the left anterior chest, radiating to her left arm, endorses numbness transiently in the left hand, left arm.  States her pain currently travels from her left anterior chest to her left arm until her elbow.  States that this is intermittent.  She first noticed the pain going into her arm around 2 AM.  She denies any weakness in the arm.  She denies any lightheadedness, dizziness, vision changes, nausea or vomiting.  She states that she sees Dr. Charles on outpatient and was recommended for an outpatient cath this coming week, when she started to have the chest pain came to the ER knowing that Dr. Chase has privileges at [facility].      ------------------------------------------------------------------------------------------------------------------------------------------    Physical Exam:    ED Triage Vitals   Temperature Heart Rate Respirations BP   03/23/24 1143 03/23/24 1143 03/23/24 1143 03/23/24 1143   36.8 °C (98.2 °F) 95 18 132/81      Pulse Ox Temp Source Heart Rate Source Patient Position   03/23/24 1143 03/23/24 1143 03/23/24 1143 03/23/24 1203   97 % Temporal Monitor Sitting      BP Location FiO2 (%)     03/23/24 1203 --     Left arm          Gen: Alert, NAD.  Vital signs are stable.  Patient is nontoxic.  Head/Neck: NCAT, neck w/ FROM  Eyes: EOMI, PERRL, anicteric sclerae, noninjected conjunctivae  Nose: Nares patent w/o rhinorrhea  Mouth:  MMM, no OP lesions noted  Heart: RRR, well perfused.  No anterior chest wall tenderness to palpation.  No overlying skin changes.  Lungs: CTA b/l no RRW, no increased work of breathing  Abdomen: soft, NT, ND, no rebound guarding or rigidity  Extremities: Warm, well perfused. Compartments soft, nontender.  2+ radial pulses, bilateral DP and PT pulses dopplerable and equal.  Neurologic: Cranial  nerves II through XII intact, no facial asymmetry.  Bilateral upper and lower extremity strength and sensation equal and intact.  Finger-nose intact bilaterally.    Skin: warm, dry   Psychological: calm, cooperative     ------------------------------------------------------------------------------------------------------------------------------------------    Medical Decision Making  63-year-old female the past medical history of HLD, GERD, CAD, presenting to the emergency department with concern for chest pain left anterior chest wall, rating down her left arm.  Vital signs on arrival are stable, patient is nontoxic.  Exam is as above.  Point-of-care ultrasound done showing no significant pericardial effusion, no significant RV strain, aortic root on ultrasound looks within normal limits.  She does have pain traveling from her chest into her left arm and has numbness in her left hand intermittently.  She is equal  strength and distal pulses are equal however there is persistent concern for a dissection like pathology, will obtain a CTA.  EKG initially obtained showed no STEMI, she does have new T wave inversions noted inferiorly and laterally.  Labs show no significant leukocytosis, hemoglobin is stable, metabolic panel is unremarkable, delta troponins are negative x 2, BNP, lipase and magnesium within normal limits.  On reevaluation, patient states that she feels like she is having increased gas, also endorses pain in her jaw.  She was given Pepcid.  I did discuss the patient's presentation, workup with Dr. Alexandre, Dr. Chase's partner, who is recommending admission at this time for likely cath early this week.  I discussed this with the medicine team who accepted the patient for admission.  She remains hemodynamically stable, well-appearing at this time.      ED Course as of 03/23/24 1516   Sat Mar 23, 2024   1142 EKG, interpreted by me, shows normal sinus rhythm at 80 bpm, normal axis, patient does have T  wave inversions noted in inferior lateral leads including 3, aVF, V3 through V6, these inversions are new compared to prior EKG.  No STEMI.  Intervals are within normal limits. [SB]   1416 Labs show negative troponin x 2, metabolic panel is unremarkable, CBC shows no leukocytosis, hemoglobin is stable, lipase, BNP, magnesium within normal limits.  CT angio of the chest and pelvis show no acute pathology, subclavian vessel is patent.  On reevaluation, patient states that she is feeling a gassy like feeling in her chest, endorsing some jaw pain as well.  Endorsing that her overt chest pain is different now. [SB]   1417 Repeat EKG which was obtained at 0 130 shows normal sinus rhythm at 85 bpm, RAD, T wave inversions are improved.  Intervals are within normal limits, no STEMI. [SB]      ED Course User Index  [SB] Nina Fraire DO         Diagnoses as of 03/23/24 1516   Chest pain, unspecified type   T wave inversion in EKG        Procedures      Chronic Medical Conditions Significantly Affecting Care: CAD     Discussion of Management with Other Providers:  Dr. Alexandre/Cardiology     Clinical Impression: chest pain, TWI     Dispo: [facility]ADM     Discussed with ED Attending, Dr. Connor       This note was dictated with Voice Recognition software, please excuse any dictation errors.     Nina Fraire DO   Emergency Medicine, PGY3      Nina Fraire DO  Resident  03/23/24 0253

## 2024-03-23 NOTE — ED TRIAGE NOTES
Patient c/o left sided chest pain radiating into her left arm with left arm numbness that started this AM. Patient was recently seen and admitted at [place] for CP and was discharged home. Patient then had pre surgical testing at [facility] a week ago and had another EKG done that was abnormal. She followed up with Dr. Chase and is scheduled for a heart cath on Monday but was told to come to the ED if she had worsening CP or SOB.

## 2024-03-23 NOTE — CONSULTS
Inpatient consult to Cardiology  Consult performed by: SHEREE Mckenzie-CNP  Consult ordered by: Kamala Bello MD  Reason for consult: chest pain          Reason For Consult  Chest pain    History Of Present Illness  Reanna Pineda is a 63 y.o. female with a past medical history of chest pain and hyperlipidemia, dizziness, obesity, and perianal sepsis/ abscess rupture, GERD, arthritis, and bronchitis that presented to the ER complaining of chest pain.     Reanna was see in our office complaining of chest pain, bubble sensation in the center of her chest, and dizziness. An echocardiogram and stress test were performed, the stress test was concerning for ischemia. She was scheduled to have the LHC performed this Wednesday with Dr. Chase. She reports this morning she woke up with the left chest pain, bubble sensation and new onset of left hand/ arm numbness and left jaw pain that was sharp and stabbing. She presented to the ER concerned about the change. 12 lead EKG was reviewed and showed T wave inversions in inferior leads, and V3-V6.   She was admitted for further work up and to have a LHC performed.      Past Medical History  She has a past medical history of Adverse effect of anesthesia, Angina pectoris (CMS/HCC), Arthritis, Awareness under anesthesia, Bronchitis, GERD (gastroesophageal reflux disease), and High cholesterol.    Surgical History  She has a past surgical history that includes Endometrial ablation; Cholecystectomy; Abscess drainage (2021); Colonoscopy (2021); and Incision and drainage perirectal abscess (2021).     Social History  She reports that she has never smoked. She has never used smokeless tobacco. She reports current alcohol use of about 6.0 standard drinks of alcohol per week. She reports that she does not use drugs.    Family History  Family History   Problem Relation Name Age of Onset    Other (cardiac problems) Mother      Diabetes Mother      Hypertension Mother      Cancer  Mother      Other (cardiac problems) Father      Diabetes Father      Hypertension Father      Cancer Father          Allergies  Percocet [oxycodone-acetaminophen] and Other    Review of Systems  Review of Systems   Constitutional:  Negative for activity change, appetite change, chills, fatigue and fever.   HENT:  Negative for congestion.    Respiratory:  Positive for chest tightness. Negative for apnea, cough, shortness of breath, wheezing and stridor.    Cardiovascular:  Positive for chest pain. Negative for palpitations and leg swelling.   Gastrointestinal:  Positive for abdominal distention. Negative for abdominal pain, blood in stool, diarrhea, nausea and vomiting.   Endocrine: Negative for cold intolerance, heat intolerance, polydipsia, polyphagia and polyuria.   Genitourinary:  Negative for frequency and urgency.   Musculoskeletal:  Negative for arthralgias, back pain, gait problem, joint swelling, myalgias, neck pain and neck stiffness.   Skin:  Negative for color change and pallor.   Neurological:  Negative for dizziness and light-headedness.   Psychiatric/Behavioral:  Negative for behavioral problems.          Physical Exam  Physical Exam  Constitutional:       Appearance: Normal appearance.   HENT:      Head: Normocephalic and atraumatic.      Nose: Nose normal. No congestion.   Eyes:      Extraocular Movements: Extraocular movements intact.      Pupils: Pupils are equal, round, and reactive to light.   Cardiovascular:      Rate and Rhythm: Normal rate and regular rhythm.      Pulses: Normal pulses.      Heart sounds: Normal heart sounds. No murmur heard.     No friction rub. No gallop.   Pulmonary:      Effort: Pulmonary effort is normal. No tachypnea, bradypnea, accessory muscle usage or respiratory distress.      Breath sounds: Normal breath sounds. No stridor. No wheezing, rhonchi or rales.   Chest:      Chest wall: No tenderness.   Abdominal:      General: Bowel sounds are normal. There is no  distension.      Palpations: Abdomen is soft.      Tenderness: There is no abdominal tenderness.   Musculoskeletal:         General: Normal range of motion.      Cervical back: Normal range of motion and neck supple.   Skin:     General: Skin is warm and dry.      Capillary Refill: Capillary refill takes less than 2 seconds.   Neurological:      General: No focal deficit present.      Mental Status: She is alert and oriented to person, place, and time. Mental status is at baseline.   Psychiatric:         Mood and Affect: Mood normal.         Behavior: Behavior normal.             Last Recorded Vitals  BP (!) 116/97 (BP Location: Left arm, Patient Position: Sitting)   Pulse 84   Temp 36.8 °C (98.2 °F) (Temporal)   Resp 20   Wt 90.7 kg (200 lb)   SpO2 97%     Relevant Results  Results for orders placed or performed during the hospital encounter of 03/23/24 (from the past 24 hour(s))   CBC and Auto Differential   Result Value Ref Range    WBC 6.4 4.4 - 11.3 x10*3/uL    nRBC 0.0 0.0 - 0.0 /100 WBCs    RBC 4.92 4.00 - 5.20 x10*6/uL    Hemoglobin 14.6 12.0 - 16.0 g/dL    Hematocrit 45.4 36.0 - 46.0 %    MCV 92 80 - 100 fL    MCH 29.7 26.0 - 34.0 pg    MCHC 32.2 32.0 - 36.0 g/dL    RDW 13.1 11.5 - 14.5 %    Platelets 245 150 - 450 x10*3/uL    Neutrophils % 63.8 40.0 - 80.0 %    Immature Granulocytes %, Automated 0.2 0.0 - 0.9 %    Lymphocytes % 27.5 13.0 - 44.0 %    Monocytes % 6.5 2.0 - 10.0 %    Eosinophils % 1.7 0.0 - 6.0 %    Basophils % 0.3 0.0 - 2.0 %    Neutrophils Absolute 4.11 1.20 - 7.70 x10*3/uL    Immature Granulocytes Absolute, Automated 0.01 0.00 - 0.70 x10*3/uL    Lymphocytes Absolute 1.77 1.20 - 4.80 x10*3/uL    Monocytes Absolute 0.42 0.10 - 1.00 x10*3/uL    Eosinophils Absolute 0.11 0.00 - 0.70 x10*3/uL    Basophils Absolute 0.02 0.00 - 0.10 x10*3/uL   Comprehensive metabolic panel   Result Value Ref Range    Glucose 106 (H) 74 - 99 mg/dL    Sodium 139 136 - 145 mmol/L    Potassium 4.0 3.5 - 5.3  mmol/L    Chloride 101 98 - 107 mmol/L    Bicarbonate 30 21 - 32 mmol/L    Anion Gap 12 10 - 20 mmol/L    Urea Nitrogen 14 6 - 23 mg/dL    Creatinine 0.72 0.50 - 1.05 mg/dL    eGFR >90 >60 mL/min/1.73m*2    Calcium 9.1 8.6 - 10.3 mg/dL    Albumin 4.2 3.4 - 5.0 g/dL    Alkaline Phosphatase 92 33 - 136 U/L    Total Protein 7.3 6.4 - 8.2 g/dL    AST 18 9 - 39 U/L    Bilirubin, Total 0.9 0.0 - 1.2 mg/dL    ALT 25 7 - 45 U/L   Lipase   Result Value Ref Range    Lipase 19 9 - 82 U/L   Magnesium   Result Value Ref Range    Magnesium 2.28 1.60 - 2.40 mg/dL   Urinalysis with Reflex Microscopic   Result Value Ref Range    Color, Urine Straw Straw, Yellow    Appearance, Urine Clear Clear    Specific Gravity, Urine 1.005 1.005 - 1.035    pH, Urine 6.0 5.0, 5.5, 6.0, 6.5, 7.0, 7.5, 8.0    Protein, Urine NEGATIVE NEGATIVE mg/dL    Glucose, Urine NEGATIVE NEGATIVE mg/dL    Blood, Urine NEGATIVE NEGATIVE    Ketones, Urine NEGATIVE NEGATIVE mg/dL    Bilirubin, Urine NEGATIVE NEGATIVE    Urobilinogen, Urine <2.0 <2.0 mg/dL    Nitrite, Urine NEGATIVE NEGATIVE    Leukocyte Esterase, Urine NEGATIVE NEGATIVE   Troponin I, High Sensitivity, Initial   Result Value Ref Range    Troponin I, High Sensitivity <3 0 - 13 ng/L   B-type natriuretic peptide   Result Value Ref Range    BNP 30 0 - 99 pg/mL   Troponin, High Sensitivity, 1 Hour   Result Value Ref Range    Troponin I, High Sensitivity <3 0 - 13 ng/L        === 03/23/24 ===    XR CHEST 1 VIEW    - Impression -  No acute cardiopulmonary disease..  Signed by Darrell Lopez MD     No echocardiogram results found for the past 12 months     Patient Active Problem List   Diagnosis    Hypercholesterolemia    Anusitis    Anal fistula    Chest pain, unspecified type    T wave inversion in EKG          Assessment/Plan     3/6/2024 Stress Test  Equivocal study for mild inferolateral and septal ischemia, normal LV systolic function. Normal functional capacity, no angina or arrhythmias or ischemic ECG  changes.    3/5/2024 Echocardiogram  LVEF 60%. Normal valvular structure and function. Abnormal left ventricular relaxation. Unable to estimate pulmonary arterial systolic pressure. Normal estimated CVP.    Chest Pain  -Troponin negative  -I reviewed the 12 lead EKG, T wave inversions in   V3-V6, and inferior leads.   -CXR  negative  -I reviewed the echocardiogram from above  -Stress test concerning for ischemia, [facility] was previously scheduled for this Wednesday outpt  - Plan for [facility] on Monday  - Start aspirin, continue atorvastatin  - Check fasting lipid panel  - Start heparin gtt    Hyperlipidemia  - Continue atorvastatin  - Lipids reviewed, , Chol: 216, T    Dizziness  - monitor on tele  - outpt ambulatory monitor  - MRI/MRA head/ neck reviewed from 24 and were negative for significant intracranial stenosis or aneurysm    SHEREE Mckenzie-CNP

## 2024-03-24 LAB
ALBUMIN SERPL BCP-MCNC: 3.6 G/DL (ref 3.4–5)
ALP SERPL-CCNC: 69 U/L (ref 33–136)
ALT SERPL W P-5'-P-CCNC: 22 U/L (ref 7–45)
ANION GAP SERPL CALC-SCNC: 10 MMOL/L (ref 10–20)
AST SERPL W P-5'-P-CCNC: 18 U/L (ref 9–39)
BASOPHILS # BLD AUTO: 0.03 X10*3/UL (ref 0–0.1)
BASOPHILS NFR BLD AUTO: 0.5 %
BILIRUB SERPL-MCNC: 1.1 MG/DL (ref 0–1.2)
BUN SERPL-MCNC: 12 MG/DL (ref 6–23)
CALCIUM SERPL-MCNC: 8.2 MG/DL (ref 8.6–10.3)
CARDIAC TROPONIN I PNL SERPL HS: 7 NG/L (ref 0–13)
CARDIAC TROPONIN I PNL SERPL HS: 9 NG/L (ref 0–13)
CHLORIDE SERPL-SCNC: 104 MMOL/L (ref 98–107)
CO2 SERPL-SCNC: 29 MMOL/L (ref 21–32)
CREAT SERPL-MCNC: 0.69 MG/DL (ref 0.5–1.05)
EGFRCR SERPLBLD CKD-EPI 2021: >90 ML/MIN/1.73M*2
EOSINOPHIL # BLD AUTO: 0.1 X10*3/UL (ref 0–0.7)
EOSINOPHIL NFR BLD AUTO: 1.8 %
ERYTHROCYTE [DISTWIDTH] IN BLOOD BY AUTOMATED COUNT: 13.2 % (ref 11.5–14.5)
GLUCOSE SERPL-MCNC: 95 MG/DL (ref 74–99)
HCT VFR BLD AUTO: 40.3 % (ref 36–46)
HGB BLD-MCNC: 13.1 G/DL (ref 12–16)
IMM GRANULOCYTES # BLD AUTO: 0.01 X10*3/UL (ref 0–0.7)
IMM GRANULOCYTES NFR BLD AUTO: 0.2 % (ref 0–0.9)
LYMPHOCYTES # BLD AUTO: 2.01 X10*3/UL (ref 1.2–4.8)
LYMPHOCYTES NFR BLD AUTO: 35.6 %
MCH RBC QN AUTO: 29.8 PG (ref 26–34)
MCHC RBC AUTO-ENTMCNC: 32.5 G/DL (ref 32–36)
MCV RBC AUTO: 92 FL (ref 80–100)
MONOCYTES # BLD AUTO: 0.43 X10*3/UL (ref 0.1–1)
MONOCYTES NFR BLD AUTO: 7.6 %
NEUTROPHILS # BLD AUTO: 3.06 X10*3/UL (ref 1.2–7.7)
NEUTROPHILS NFR BLD AUTO: 54.3 %
NRBC BLD-RTO: 0 /100 WBCS (ref 0–0)
PLATELET # BLD AUTO: 220 X10*3/UL (ref 150–450)
POTASSIUM SERPL-SCNC: 3.8 MMOL/L (ref 3.5–5.3)
PROT SERPL-MCNC: 6 G/DL (ref 6.4–8.2)
RBC # BLD AUTO: 4.4 X10*6/UL (ref 4–5.2)
SODIUM SERPL-SCNC: 139 MMOL/L (ref 136–145)
UFH PPP CHRO-ACNC: 0.4 IU/ML
UFH PPP CHRO-ACNC: 0.4 IU/ML
WBC # BLD AUTO: 5.6 X10*3/UL (ref 4.4–11.3)

## 2024-03-24 PROCEDURE — 80053 COMPREHEN METABOLIC PANEL: CPT | Performed by: STUDENT IN AN ORGANIZED HEALTH CARE EDUCATION/TRAINING PROGRAM

## 2024-03-24 PROCEDURE — 85520 HEPARIN ASSAY: CPT | Performed by: INTERNAL MEDICINE

## 2024-03-24 PROCEDURE — 84484 ASSAY OF TROPONIN QUANT: CPT | Performed by: NURSE PRACTITIONER

## 2024-03-24 PROCEDURE — 99233 SBSQ HOSP IP/OBS HIGH 50: CPT | Performed by: STUDENT IN AN ORGANIZED HEALTH CARE EDUCATION/TRAINING PROGRAM

## 2024-03-24 PROCEDURE — 2500000004 HC RX 250 GENERAL PHARMACY W/ HCPCS (ALT 636 FOR OP/ED): Performed by: NURSE PRACTITIONER

## 2024-03-24 PROCEDURE — 36415 COLL VENOUS BLD VENIPUNCTURE: CPT | Performed by: STUDENT IN AN ORGANIZED HEALTH CARE EDUCATION/TRAINING PROGRAM

## 2024-03-24 PROCEDURE — 1200000002 HC GENERAL ROOM WITH TELEMETRY DAILY

## 2024-03-24 PROCEDURE — 85520 HEPARIN ASSAY: CPT | Performed by: STUDENT IN AN ORGANIZED HEALTH CARE EDUCATION/TRAINING PROGRAM

## 2024-03-24 PROCEDURE — 2500000005 HC RX 250 GENERAL PHARMACY W/O HCPCS: Performed by: NURSE PRACTITIONER

## 2024-03-24 PROCEDURE — 99231 SBSQ HOSP IP/OBS SF/LOW 25: CPT | Performed by: NURSE PRACTITIONER

## 2024-03-24 PROCEDURE — 2500000002 HC RX 250 W HCPCS SELF ADMINISTERED DRUGS (ALT 637 FOR MEDICARE OP, ALT 636 FOR OP/ED): Performed by: NURSE PRACTITIONER

## 2024-03-24 PROCEDURE — 2500000001 HC RX 250 WO HCPCS SELF ADMINISTERED DRUGS (ALT 637 FOR MEDICARE OP)

## 2024-03-24 PROCEDURE — C9113 INJ PANTOPRAZOLE SODIUM, VIA: HCPCS | Performed by: NURSE PRACTITIONER

## 2024-03-24 PROCEDURE — 2500000004 HC RX 250 GENERAL PHARMACY W/ HCPCS (ALT 636 FOR OP/ED): Performed by: STUDENT IN AN ORGANIZED HEALTH CARE EDUCATION/TRAINING PROGRAM

## 2024-03-24 PROCEDURE — 2500000001 HC RX 250 WO HCPCS SELF ADMINISTERED DRUGS (ALT 637 FOR MEDICARE OP): Performed by: STUDENT IN AN ORGANIZED HEALTH CARE EDUCATION/TRAINING PROGRAM

## 2024-03-24 PROCEDURE — 36415 COLL VENOUS BLD VENIPUNCTURE: CPT | Performed by: NURSE PRACTITIONER

## 2024-03-24 PROCEDURE — 85025 COMPLETE CBC W/AUTO DIFF WBC: CPT | Performed by: STUDENT IN AN ORGANIZED HEALTH CARE EDUCATION/TRAINING PROGRAM

## 2024-03-24 RX ORDER — MAGNESIUM HYDROXIDE 2400 MG/10ML
10 SUSPENSION ORAL ONCE
Status: DISCONTINUED | OUTPATIENT
Start: 2024-03-24 | End: 2024-03-25 | Stop reason: HOSPADM

## 2024-03-24 RX ORDER — PANTOPRAZOLE SODIUM 40 MG/10ML
40 INJECTION, POWDER, LYOPHILIZED, FOR SOLUTION INTRAVENOUS DAILY
Status: DISCONTINUED | OUTPATIENT
Start: 2024-03-24 | End: 2024-03-25

## 2024-03-24 RX ORDER — DOCUSATE SODIUM 100 MG/1
100 CAPSULE, LIQUID FILLED ORAL 2 TIMES DAILY
Status: DISCONTINUED | OUTPATIENT
Start: 2024-03-24 | End: 2024-03-25 | Stop reason: HOSPADM

## 2024-03-24 RX ORDER — NITROGLYCERIN 0.4 MG/1
0.4 TABLET SUBLINGUAL EVERY 5 MIN PRN
Status: DISCONTINUED | OUTPATIENT
Start: 2024-03-24 | End: 2024-03-25 | Stop reason: HOSPADM

## 2024-03-24 RX ORDER — MORPHINE SULFATE 2 MG/ML
1 INJECTION, SOLUTION INTRAMUSCULAR; INTRAVENOUS ONCE
Status: COMPLETED | OUTPATIENT
Start: 2024-03-24 | End: 2024-03-24

## 2024-03-24 RX ADMIN — Medication 2 L/MIN: at 01:30

## 2024-03-24 RX ADMIN — MORPHINE SULFATE 1 MG: 2 INJECTION, SOLUTION INTRAMUSCULAR; INTRAVENOUS at 01:12

## 2024-03-24 RX ADMIN — ASPIRIN 81 MG CHEWABLE TABLET 81 MG: 81 TABLET CHEWABLE at 09:16

## 2024-03-24 RX ADMIN — DOCUSATE SODIUM 100 MG: 100 CAPSULE, LIQUID FILLED ORAL at 18:44

## 2024-03-24 RX ADMIN — ATORVASTATIN CALCIUM 80 MG: 80 TABLET, FILM COATED ORAL at 21:38

## 2024-03-24 RX ADMIN — HEPARIN SODIUM 1000 UNITS/HR: 10000 INJECTION, SOLUTION INTRAVENOUS at 21:42

## 2024-03-24 RX ADMIN — TICAGRELOR 90 MG: 90 TABLET ORAL at 09:16

## 2024-03-24 RX ADMIN — TICAGRELOR 90 MG: 90 TABLET ORAL at 21:38

## 2024-03-24 RX ADMIN — TICAGRELOR 180 MG: 90 TABLET ORAL at 01:12

## 2024-03-24 RX ADMIN — PANTOPRAZOLE SODIUM 40 MG: 40 INJECTION, POWDER, FOR SOLUTION INTRAVENOUS at 15:07

## 2024-03-24 ASSESSMENT — PAIN DESCRIPTION - ORIENTATION: ORIENTATION: MID

## 2024-03-24 ASSESSMENT — COGNITIVE AND FUNCTIONAL STATUS - GENERAL
DAILY ACTIVITIY SCORE: 24
MOBILITY SCORE: 24
DAILY ACTIVITIY SCORE: 24
MOBILITY SCORE: 24

## 2024-03-24 ASSESSMENT — PAIN - FUNCTIONAL ASSESSMENT: PAIN_FUNCTIONAL_ASSESSMENT: 0-10

## 2024-03-24 ASSESSMENT — PAIN DESCRIPTION - LOCATION: LOCATION: CHEST

## 2024-03-24 ASSESSMENT — PAIN SCALES - GENERAL
PAINLEVEL_OUTOF10: 0 - NO PAIN
PAINLEVEL_OUTOF10: 0 - NO PAIN
PAINLEVEL_OUTOF10: 7

## 2024-03-24 NOTE — PROGRESS NOTES
Reanna Pineda is a 63 y.o. female on day 1 of admission presenting with Chest pain, unspecified type.      Subjective   She is resting in the bed, states overnight she had an episode where she got a heavy sensation in her chest and felt like she had to burp. She was given Morphine and placed on oxygen with relief.       Review of systems:  Constitutional: negative for fever, chills, or malaise  Neuro: negative for dizziness, headache, numbness, tingling  ENT: Negative for nasal congestion or sore throat  Resp: negative for shortness of breath, cough, or wheezing  CV: negative for palpitations  GI: negative for abd pain, nausea, vomiting or diarrhea  : negative for dysuria, frequency, or urgency  Skin: negative for lesions, wounds, or rash  Musculoskeletal: Negative for weakness, myalgia, or arthralgia  Endocrine: Negative for polyuria or polydipsia         Objective   Constitutional: Well developed, awake/alert/oriented x3, no distress, alert and cooperative  Eyes: PERRL, EOMI, clear sclera  ENMT: mucous membranes moist, no apparent injury, no lesions seen  Head/Neck: Neck supple, no apparent injury, thyroid without mass or tenderness, No JVD, trachea midline, no bruits  Respiratory/Thorax: Patent airways, CTAB, normal breath sounds with good chest expansion, thorax symmetric  Cardiovascular: Regular, rate and rhythm, no murmurs, 2+ equal pulses of the extremities, normal S 1and S 2  Gastrointestinal: Nondistended, soft, non-tender, no rebound tenderness or guarding, no masses palpable, no organomegaly, +BS, no bruits  Musculoskeletal: ROM intact, no joint swelling, normal strength  Extremities: normal extremities, no cyanosis edema, contusions or wounds, no clubbing  Neurological: alert and oriented x3, intact senses, motor, response and reflexes, normal strength  Lymphatic: No significant lymphadenopathy  Psychological: Appropriate mood and behavior  Skin: Warm and dry, no lesions, no rashes      Last Recorded  "Vitals  /70 (BP Location: Right arm, Patient Position: Lying)   Pulse 72   Temp 36.4 °C (97.6 °F) (Oral)   Resp 18   Ht 1.702 m (5' 7\")   Wt 87.1 kg (192 lb)   SpO2 93%   BMI 30.07 kg/m²     Intake/Output last 3 Shifts:  No intake/output data recorded.  I/O this shift:  In: 664.7 [P.O.:480; I.V.:184.7]  Out: -     Relevant Results  Scheduled medications  aspirin, 81 mg, oral, Daily  atorvastatin, 80 mg, oral, Nightly  oxygen, , inhalation, Continuous - Inhalation  pantoprazole, 40 mg, intravenous, Daily  ticagrelor, 90 mg, oral, BID      Continuous medications  heparin, 0-4,000 Units/hr, Last Rate: 1,000 Units/hr (03/24/24 1135)      PRN medications  PRN medications: heparin, nitroglycerin    Results for orders placed or performed during the hospital encounter of 03/23/24 (from the past 24 hour(s))   Troponin, High Sensitivity, 1 Hour   Result Value Ref Range    Troponin I, High Sensitivity <3 0 - 13 ng/L   Troponin I, High Sensitivity   Result Value Ref Range    Troponin I, High Sensitivity <3 0 - 13 ng/L   Heparin Assay, UFH   Result Value Ref Range    Heparin Unfractionated 0.5 See Comment Below for Therapeutic Ranges IU/mL   Troponin I, High Sensitivity   Result Value Ref Range    Troponin I, High Sensitivity 7 0 - 13 ng/L   Heparin Assay, UFH   Result Value Ref Range    Heparin Unfractionated 0.4 See Comment Below for Therapeutic Ranges IU/mL   Comprehensive Metabolic Panel   Result Value Ref Range    Glucose 95 74 - 99 mg/dL    Sodium 139 136 - 145 mmol/L    Potassium 3.8 3.5 - 5.3 mmol/L    Chloride 104 98 - 107 mmol/L    Bicarbonate 29 21 - 32 mmol/L    Anion Gap 10 10 - 20 mmol/L    Urea Nitrogen 12 6 - 23 mg/dL    Creatinine 0.69 0.50 - 1.05 mg/dL    eGFR >90 >60 mL/min/1.73m*2    Calcium 8.2 (L) 8.6 - 10.3 mg/dL    Albumin 3.6 3.4 - 5.0 g/dL    Alkaline Phosphatase 69 33 - 136 U/L    Total Protein 6.0 (L) 6.4 - 8.2 g/dL    AST 18 9 - 39 U/L    Bilirubin, Total 1.1 0.0 - 1.2 mg/dL    ALT 22 7 " - 45 U/L   CBC and Auto Differential   Result Value Ref Range    WBC 5.6 4.4 - 11.3 x10*3/uL    nRBC 0.0 0.0 - 0.0 /100 WBCs    RBC 4.40 4.00 - 5.20 x10*6/uL    Hemoglobin 13.1 12.0 - 16.0 g/dL    Hematocrit 40.3 36.0 - 46.0 %    MCV 92 80 - 100 fL    MCH 29.8 26.0 - 34.0 pg    MCHC 32.5 32.0 - 36.0 g/dL    RDW 13.2 11.5 - 14.5 %    Platelets 220 150 - 450 x10*3/uL    Neutrophils % 54.3 40.0 - 80.0 %    Immature Granulocytes %, Automated 0.2 0.0 - 0.9 %    Lymphocytes % 35.6 13.0 - 44.0 %    Monocytes % 7.6 2.0 - 10.0 %    Eosinophils % 1.8 0.0 - 6.0 %    Basophils % 0.5 0.0 - 2.0 %    Neutrophils Absolute 3.06 1.20 - 7.70 x10*3/uL    Immature Granulocytes Absolute, Automated 0.01 0.00 - 0.70 x10*3/uL    Lymphocytes Absolute 2.01 1.20 - 4.80 x10*3/uL    Monocytes Absolute 0.43 0.10 - 1.00 x10*3/uL    Eosinophils Absolute 0.10 0.00 - 0.70 x10*3/uL    Basophils Absolute 0.03 0.00 - 0.10 x10*3/uL   Heparin Assay, UFH   Result Value Ref Range    Heparin Unfractionated 0.4 See Comment Below for Therapeutic Ranges IU/mL   Troponin I, High Sensitivity   Result Value Ref Range    Troponin I, High Sensitivity 9 0 - 13 ng/L       CT angio chest abdomen pelvis   Final Result   There is no aneurysm or dissection of the aorta. Left subclavian   artery is widely patent.        No acute abnormality is noted in the chest, abdomen or pelvis.             MACRO:   None.        Signed by: Grisel Ryan 3/23/2024 1:28 PM   Dictation workstation:   [id number]      XR chest 1 view   Final Result   No acute cardiopulmonary disease..   Signed by Darrell Lopez MD      Point of Care Ultrasound    (Results Pending)       No echocardiogram results found for the past 12 months         Assessment/Plan   Principal Problem:    Chest pain, unspecified type  Active Problems:    T wave inversion in EKG    3/6/2024 Stress Test  Equivocal study for mild inferolateral and septal ischemia, normal LV systolic function. Normal functional capacity, no  angina or arrhythmias or ischemic ECG changes.     3/5/2024 Echocardiogram  LVEF 60%. Normal valvular structure and function. Abnormal left ventricular relaxation. Unable to estimate pulmonary arterial systolic pressure. Normal estimated CVP.     Chest Pain  -Troponin negative  -I reviewed the 12 lead EKG, T wave inversions in   V3-V6, and inferior leads.   -CXR  negative  -I reviewed the echocardiogram from above  -Stress test concerning for ischemia, [facility] was previously scheduled for this Wednesday outpt  - Plan for [facility] tomorrow  - Cont Brilinta, aspirin, continue atorvastatin  - Check fasting lipid panel  - heparin gtt     Hyperlipidemia  - Continue atorvastatin  - Lipids from Dec 2023 reviewed, , Chol: 216, T  - Recheck lipid panel in am     Dizziness  - monitor on tele  - outpt ambulatory monitor  - MRI/MRA head/ neck reviewed from 24 and were negative for significant intracranial stenosis or aneurysm      SHEREE Lowe-CNP

## 2024-03-24 NOTE — PROGRESS NOTES
03/24/24 0854   Discharge Planning   Living Arrangements Friends   Support Systems Friends/neighbors   Assistance Needed A&Ox3, independent with ADLs, no DME, drives, room air at baseline   Type of Residence Private residence   Number of Stairs to Enter Residence 3   Number of Stairs Within Residence 10   Do you have animals or pets at home? Yes   Type of Animals or Pets 4 dogs, 1 cat   Home or Post Acute Services None   Patient expects to be discharged to: Home no needs   Does the patient need discharge transport arranged? No     03/24/2024 0855: Spoke to the patient at the bedside. Patient prefers to discharge to home when medically ready.

## 2024-03-24 NOTE — CARE PLAN
Problem: Pain - Adult  Goal: Verbalizes/displays adequate comfort level or baseline comfort level  Outcome: Progressing     Problem: Safety - Adult  Goal: Free from fall injury  Outcome: Progressing     Problem: Discharge Planning  Goal: Discharge to home or other facility with appropriate resources  Outcome: Progressing     Problem: Chronic Conditions and Co-morbidities  Goal: Patient's chronic conditions and co-morbidity symptoms are monitored and maintained or improved  Outcome: Progressing     Problem: Pain  Goal: Takes deep breaths with improved pain control throughout the shift  Outcome: Progressing     Problem: Pain  Goal: Performs ADL's with improved pain control throughout shift  Outcome: Progressing       The clinical goals for the shift include patient will have no new onset of chest pain

## 2024-03-24 NOTE — PROGRESS NOTES
"Reanna Pineda is a 63 y.o. female on day 1 of admission presenting with Chest pain, unspecified type.    Subjective   Pt had episode of chest pain overnight that was relived with morphine.       Objective     Physical Exam  Vitals and nursing note reviewed.   Constitutional:       General: She is not in acute distress.     Appearance: Normal appearance. She is not ill-appearing or toxic-appearing.   HENT:      Head: Normocephalic and atraumatic.      Mouth/Throat:      Mouth: Mucous membranes are moist.   Eyes:      Extraocular Movements: Extraocular movements intact.      Conjunctiva/sclera: Conjunctivae normal.      Pupils: Pupils are equal, round, and reactive to light.   Cardiovascular:      Rate and Rhythm: Normal rate and regular rhythm.      Heart sounds: No murmur heard.     No gallop.   Pulmonary:      Effort: Pulmonary effort is normal. No respiratory distress.      Breath sounds: Normal breath sounds. No wheezing, rhonchi or rales.   Abdominal:      General: Abdomen is flat. Bowel sounds are normal. There is no distension.      Palpations: Abdomen is soft. There is no mass.      Tenderness: There is no abdominal tenderness.   Musculoskeletal:         General: No swelling or tenderness. Normal range of motion.      Cervical back: Normal range of motion and neck supple.   Skin:     General: Skin is warm and dry.   Neurological:      General: No focal deficit present.      Mental Status: She is alert and oriented to person, place, and time. Mental status is at baseline.   Psychiatric:         Mood and Affect: Mood normal.         Behavior: Behavior normal.         Thought Content: Thought content normal.         Judgment: Judgment normal.         Last Recorded Vitals:  /70 (BP Location: Right arm, Patient Position: Lying)   Pulse 72   Temp 36.4 °C (97.6 °F) (Oral)   Resp 18   Ht 1.702 m (5' 7\")   Wt 87.1 kg (192 lb)   SpO2 93%   BMI 30.07 kg/m²      Scheduled medications:  aspirin, 81 mg, " oral, Daily  atorvastatin, 80 mg, oral, Nightly  oxygen, , inhalation, Continuous - Inhalation  ticagrelor, 90 mg, oral, BID      Continuous medications:  heparin, 0-4,000 Units/hr, Last Rate: 1,000 Units/hr (03/23/24 1707)      PRN medications:  PRN medications: heparin     Relevant Results:  Results for orders placed or performed during the hospital encounter of 03/23/24 (from the past 24 hour(s))   CBC and Auto Differential   Result Value Ref Range    WBC 6.4 4.4 - 11.3 x10*3/uL    nRBC 0.0 0.0 - 0.0 /100 WBCs    RBC 4.92 4.00 - 5.20 x10*6/uL    Hemoglobin 14.6 12.0 - 16.0 g/dL    Hematocrit 45.4 36.0 - 46.0 %    MCV 92 80 - 100 fL    MCH 29.7 26.0 - 34.0 pg    MCHC 32.2 32.0 - 36.0 g/dL    RDW 13.1 11.5 - 14.5 %    Platelets 245 150 - 450 x10*3/uL    Neutrophils % 63.8 40.0 - 80.0 %    Immature Granulocytes %, Automated 0.2 0.0 - 0.9 %    Lymphocytes % 27.5 13.0 - 44.0 %    Monocytes % 6.5 2.0 - 10.0 %    Eosinophils % 1.7 0.0 - 6.0 %    Basophils % 0.3 0.0 - 2.0 %    Neutrophils Absolute 4.11 1.20 - 7.70 x10*3/uL    Immature Granulocytes Absolute, Automated 0.01 0.00 - 0.70 x10*3/uL    Lymphocytes Absolute 1.77 1.20 - 4.80 x10*3/uL    Monocytes Absolute 0.42 0.10 - 1.00 x10*3/uL    Eosinophils Absolute 0.11 0.00 - 0.70 x10*3/uL    Basophils Absolute 0.02 0.00 - 0.10 x10*3/uL   Comprehensive metabolic panel   Result Value Ref Range    Glucose 106 (H) 74 - 99 mg/dL    Sodium 139 136 - 145 mmol/L    Potassium 4.0 3.5 - 5.3 mmol/L    Chloride 101 98 - 107 mmol/L    Bicarbonate 30 21 - 32 mmol/L    Anion Gap 12 10 - 20 mmol/L    Urea Nitrogen 14 6 - 23 mg/dL    Creatinine 0.72 0.50 - 1.05 mg/dL    eGFR >90 >60 mL/min/1.73m*2    Calcium 9.1 8.6 - 10.3 mg/dL    Albumin 4.2 3.4 - 5.0 g/dL    Alkaline Phosphatase 92 33 - 136 U/L    Total Protein 7.3 6.4 - 8.2 g/dL    AST 18 9 - 39 U/L    Bilirubin, Total 0.9 0.0 - 1.2 mg/dL    ALT 25 7 - 45 U/L   Lipase   Result Value Ref Range    Lipase 19 9 - 82 U/L   Magnesium    Result Value Ref Range    Magnesium 2.28 1.60 - 2.40 mg/dL   Urinalysis with Reflex Microscopic   Result Value Ref Range    Color, Urine Straw Straw, Yellow    Appearance, Urine Clear Clear    Specific Gravity, Urine 1.005 1.005 - 1.035    pH, Urine 6.0 5.0, 5.5, 6.0, 6.5, 7.0, 7.5, 8.0    Protein, Urine NEGATIVE NEGATIVE mg/dL    Glucose, Urine NEGATIVE NEGATIVE mg/dL    Blood, Urine NEGATIVE NEGATIVE    Ketones, Urine NEGATIVE NEGATIVE mg/dL    Bilirubin, Urine NEGATIVE NEGATIVE    Urobilinogen, Urine <2.0 <2.0 mg/dL    Nitrite, Urine NEGATIVE NEGATIVE    Leukocyte Esterase, Urine NEGATIVE NEGATIVE   Troponin I, High Sensitivity, Initial   Result Value Ref Range    Troponin I, High Sensitivity <3 0 - 13 ng/L   B-type natriuretic peptide   Result Value Ref Range    BNP 30 0 - 99 pg/mL   Troponin, High Sensitivity, 1 Hour   Result Value Ref Range    Troponin I, High Sensitivity <3 0 - 13 ng/L   Troponin I, High Sensitivity   Result Value Ref Range    Troponin I, High Sensitivity <3 0 - 13 ng/L   Heparin Assay, UFH   Result Value Ref Range    Heparin Unfractionated 0.5 See Comment Below for Therapeutic Ranges IU/mL   Troponin I, High Sensitivity   Result Value Ref Range    Troponin I, High Sensitivity 7 0 - 13 ng/L   Heparin Assay, UFH   Result Value Ref Range    Heparin Unfractionated 0.4 See Comment Below for Therapeutic Ranges IU/mL   Comprehensive Metabolic Panel   Result Value Ref Range    Glucose 95 74 - 99 mg/dL    Sodium 139 136 - 145 mmol/L    Potassium 3.8 3.5 - 5.3 mmol/L    Chloride 104 98 - 107 mmol/L    Bicarbonate 29 21 - 32 mmol/L    Anion Gap 10 10 - 20 mmol/L    Urea Nitrogen 12 6 - 23 mg/dL    Creatinine 0.69 0.50 - 1.05 mg/dL    eGFR >90 >60 mL/min/1.73m*2    Calcium 8.2 (L) 8.6 - 10.3 mg/dL    Albumin 3.6 3.4 - 5.0 g/dL    Alkaline Phosphatase 69 33 - 136 U/L    Total Protein 6.0 (L) 6.4 - 8.2 g/dL    AST 18 9 - 39 U/L    Bilirubin, Total 1.1 0.0 - 1.2 mg/dL    ALT 22 7 - 45 U/L   CBC and Auto  Differential   Result Value Ref Range    WBC 5.6 4.4 - 11.3 x10*3/uL    nRBC 0.0 0.0 - 0.0 /100 WBCs    RBC 4.40 4.00 - 5.20 x10*6/uL    Hemoglobin 13.1 12.0 - 16.0 g/dL    Hematocrit 40.3 36.0 - 46.0 %    MCV 92 80 - 100 fL    MCH 29.8 26.0 - 34.0 pg    MCHC 32.5 32.0 - 36.0 g/dL    RDW 13.2 11.5 - 14.5 %    Platelets 220 150 - 450 x10*3/uL    Neutrophils % 54.3 40.0 - 80.0 %    Immature Granulocytes %, Automated 0.2 0.0 - 0.9 %    Lymphocytes % 35.6 13.0 - 44.0 %    Monocytes % 7.6 2.0 - 10.0 %    Eosinophils % 1.8 0.0 - 6.0 %    Basophils % 0.5 0.0 - 2.0 %    Neutrophils Absolute 3.06 1.20 - 7.70 x10*3/uL    Immature Granulocytes Absolute, Automated 0.01 0.00 - 0.70 x10*3/uL    Lymphocytes Absolute 2.01 1.20 - 4.80 x10*3/uL    Monocytes Absolute 0.43 0.10 - 1.00 x10*3/uL    Eosinophils Absolute 0.10 0.00 - 0.70 x10*3/uL    Basophils Absolute 0.03 0.00 - 0.10 x10*3/uL   Heparin Assay, UFH   Result Value Ref Range    Heparin Unfractionated 0.4 See Comment Below for Therapeutic Ranges IU/mL   Troponin I, High Sensitivity   Result Value Ref Range    Troponin I, High Sensitivity 9 0 - 13 ng/L       XR chest 1 view    Result Date: 3/23/2024  STUDY: Chest Radiograph;  3/23/2024 12:35 PM INDICATION: Chest pain. COMPARISON: CT AP 1/18/2024. ACCESSION NUMBER(S): [id number] ORDERING CLINICIAN: LAVON GARCIA TECHNIQUE:  Frontal chest was obtained at 12:34 hours. FINDINGS: CARDIOMEDIASTINAL SILHOUETTE: Cardiomediastinal silhouette is normal in size and configuration.  LUNGS: Lungs are clear.  ABDOMEN: No remarkable upper abdominal findings.  BONES: No acute osseous changes.    No acute cardiopulmonary disease.. Signed by Darrell Lopez MD    CT angio chest abdomen pelvis    Result Date: 3/23/2024  Interpreted By:  Grisel Ryan, STUDY: CT ANGIO CHEST ABDOMEN PELVIS; ;  3/23/2024 1:05 pm   INDICATION: Signs/Symptoms:c/f dissection. c/o L cp with numbness down L UE.   COMPARISON: CT abdomen pelvis 01/18/2024   ACCESSION  NUMBER(S): [id number]   ORDERING CLINICIAN: LAVON GARCIA   TECHNIQUE: Imaging was performed from thoracic apex through ischial tuberosities prior to and following intravenous administration of 90 mL Omnipaque 350. Postcontrast images or timed for arterial opacification. Multi planer reconstructions were generated at a separate workstation [id number] and MIP reconstructions were generated.   FINDINGS: Minimal athero sclerotic calcification is present in the aortic arch with no other vascular calcification noted in the length of the aorta. There is no aneurysmal dilatation in the aorta, brachiocephalic vessels or iliac arteries. Postcontrast there is no dissection identified in the aorta and there is no focal extravasation of opacify blood.   Brachiocephalic vessels originate from the aorta in typical fashion and are widely patent. With regard to the left subclavian artery there is no stenosis or evidence of dissection. Celiac and superior mesenteric arteries are widely patent. There are single renal arteries bilaterally which are widely patent. Inferior mesenteric artery and iliac arteries are patent.   Main pulmonary artery is normal in size. Heart is normal in size. Mild coronary calcification is noted, particularly in the LAD. There is no pericardial effusion.   Trachea and mainstem bronchi are patent with no endoluminal mass. Lungs are clear. No pleural effusion is present.   Visible portion of the thyroid appears normal. There is no mediastinal or hilar lymphadenopathy.   Esophagus appears normal.   No mass is noted in the liver. There is no intra or extrahepatic biliary dilatation. Gallbladder is surgically absent.   No mass or inflammation is noted involving the pancreas.   Spleen is normal in size with no focal mass noted.   Adrenal glands appear normal. Kidneys enhance symmetrically with no hydronephrosis noted. Mid posterior left kidney has a 2 cm cyst. No suspicious renal mass is detected   Bowel loops are  nondilated. Appendix appears normal. No ascites, pneumoperitoneum or mesenteric inflammation is identified.   Vena cava is normal in size. There are no pathologically enlarged retroperitoneal, iliac or inguinal lymph nodes identified.   Urinary bladder is normal in appearance with no wall thickening.   Uterus is nonenlarged   No abdominal wall hernia is identified.   Degenerative endplate hypertrophy in the spine is relatively mild. More moderate partially bridging spurs are present in the lower thoracic spine. No suspicious osseous lesions are noted.       There is no aneurysm or dissection of the aorta. Left subclavian artery is widely patent.   No acute abnormality is noted in the chest, abdomen or pelvis.     MACRO: None.   Signed by: Grisel Ryan 3/23/2024 1:28 PM Dictation workstation:   [id number]            Assessment/Plan   Principal Problem:    Chest pain, unspecified type  Active Problems:    T wave inversion in EKG    Chest pain high suspicious for ACS  Pt has recent positive stress test, now ekg changes  - cardio following  - will need LHC per cardio  - statin/ASA  - hep gtt  - brilinta  -  morphine PRN    Diet: cardiac  DVT ppx: hep gtt  Code status: Full, confirmed with pt   Dispo: monitor clinically, possible LHC tomorrow per cardio         Kamala Bello MD  Hospitalist

## 2024-03-24 NOTE — PROGRESS NOTES
Reanna Pineda is a 63 y.o. female on day 1 of admission presenting with Chest pain, unspecified type.      Subjective   Called overnight for c/o severe left sided chest pain overnight that resolved after 10 min. and was associated with left arm pain while at rest. She received morphine and added Brilinta and oxygen. Troponin increased  to 7. EKG unchanged.       Objective     Last Recorded Vitals  /73 (BP Location: Left arm, Patient Position: Lying)   Pulse 70   Temp 37 °C (98.6 °F) (Temporal)   Resp 20   Wt 87.1 kg (192 lb)   SpO2 92%   Intake/Output last 3 Shifts:  No intake or output data in the 24 hours ending 03/24/24 0226    Admission Weight  Weight: 90.7 kg (200 lb) (03/23/24 1143)    Daily Weight  03/23/24 : 87.1 kg (192 lb)    Image Results  XR chest 1 view  Narrative: STUDY:  Chest Radiograph;  3/23/2024 12:35 PM  INDICATION:  Chest pain.  COMPARISON:  CT AP 1/18/2024.  ACCESSION NUMBER(S):  [id number]  ORDERING CLINICIAN:  LAVON GARCIA  TECHNIQUE:  Frontal chest was obtained at 12:34 hours.  FINDINGS:  CARDIOMEDIASTINAL SILHOUETTE:  Cardiomediastinal silhouette is normal in size and configuration.     LUNGS:  Lungs are clear.     ABDOMEN:  No remarkable upper abdominal findings.     BONES:  No acute osseous changes.  Impression: No acute cardiopulmonary disease..  Signed by Darrell Lopez MD  CT angio chest abdomen pelvis  Narrative: Interpreted By:  Grisel Ryan,   STUDY:  CT ANGIO CHEST ABDOMEN PELVIS; ;  3/23/2024 1:05 pm      INDICATION:  Signs/Symptoms:c/f dissection. c/o L cp with numbness down L UE.      COMPARISON:  CT abdomen pelvis 01/18/2024      ACCESSION NUMBER(S):  [id number]      ORDERING CLINICIAN:  LAVON GARCAI      TECHNIQUE:  Imaging was performed from thoracic apex through ischial tuberosities  prior to and following intravenous administration of 90 mL Omnipaque  350. Postcontrast images or timed for arterial opacification. Multi  planer reconstructions were  generated at a separate workstation [id number]  and MIP reconstructions were generated.      FINDINGS:  Minimal athero sclerotic calcification is present in the aortic arch  with no other vascular calcification noted in the length of the  aorta. There is no aneurysmal dilatation in the aorta,  brachiocephalic vessels or iliac arteries. Postcontrast there is no  dissection identified in the aorta and there is no focal  extravasation of opacify blood.      Brachiocephalic vessels originate from the aorta in typical fashion  and are widely patent. With regard to the left subclavian artery  there is no stenosis or evidence of dissection. Celiac and superior  mesenteric arteries are widely patent. There are single renal  arteries bilaterally which are widely patent. Inferior mesenteric  artery and iliac arteries are patent.      Main pulmonary artery is normal in size. Heart is normal in size.  Mild coronary calcification is noted, particularly in the LAD. There  is no pericardial effusion.      Trachea and mainstem bronchi are patent with no endoluminal mass.  Lungs are clear. No pleural effusion is present.      Visible portion of the thyroid appears normal. There is no  mediastinal or hilar lymphadenopathy.      Esophagus appears normal.      No mass is noted in the liver. There is no intra or extrahepatic  biliary dilatation. Gallbladder is surgically absent.      No mass or inflammation is noted involving the pancreas.      Spleen is normal in size with no focal mass noted.      Adrenal glands appear normal. Kidneys enhance symmetrically with no  hydronephrosis noted. Mid posterior left kidney has a 2 cm cyst. No  suspicious renal mass is detected      Bowel loops are nondilated. Appendix appears normal. No ascites,  pneumoperitoneum or mesenteric inflammation is identified.      Vena cava is normal in size. There are no pathologically enlarged  retroperitoneal, iliac or inguinal lymph nodes identified.      Urinary bladder  is normal in appearance with no wall thickening.      Uterus is nonenlarged      No abdominal wall hernia is identified.      Degenerative endplate hypertrophy in the spine is relatively mild.  More moderate partially bridging spurs are present in the lower  thoracic spine. No suspicious osseous lesions are noted.      Impression: There is no aneurysm or dissection of the aorta. Left subclavian  artery is widely patent.      No acute abnormality is noted in the chest, abdomen or pelvis.          MACRO:  None.      Signed by: Grisel Ryan 3/23/2024 1:28 PM  Dictation workstation:   [id number]      Physical Exam  Constitutional:       General: She is not in acute distress.     Appearance: She is obese. She is not ill-appearing, toxic-appearing or diaphoretic.   HENT:      Head: Normocephalic and atraumatic.      Mouth/Throat:      Mouth: Mucous membranes are moist.      Pharynx: Oropharynx is clear.   Eyes:      Conjunctiva/sclera: Conjunctivae normal.   Cardiovascular:      Rate and Rhythm: Normal rate and regular rhythm.      Pulses: Normal pulses.      Heart sounds: Normal heart sounds. No murmur heard.     Comments: Distal pulses palpable  Pulmonary:      Effort: Pulmonary effort is normal. No respiratory distress.      Breath sounds: Normal breath sounds. No wheezing, rhonchi or rales.   Abdominal:      General: Bowel sounds are normal. There is no distension.      Palpations: Abdomen is soft.      Tenderness: There is no abdominal tenderness. There is no guarding or rebound.   Musculoskeletal:         General: No swelling.      Cervical back: Normal range of motion.      Right lower leg: No edema.      Left lower leg: No edema.   Skin:     General: Skin is warm and dry.      Coloration: Skin is pale.   Neurological:      General: No focal deficit present.      Mental Status: She is alert and oriented to person, place, and time. Mental status is at baseline.   Psychiatric:         Behavior: Behavior normal.              Relevant Results  Scheduled medications  aspirin, 81 mg, oral, Daily  atorvastatin, 80 mg, oral, Nightly  oxygen, , inhalation, Continuous - Inhalation  ticagrelor, 90 mg, oral, BID      Continuous medications  heparin, 0-4,000 Units/hr, Last Rate: 1,000 Units/hr (03/23/24 1707)      PRN medications  PRN medications: heparin    XR chest 1 view    Result Date: 3/23/2024  STUDY: Chest Radiograph;  3/23/2024 12:35 PM INDICATION: Chest pain. COMPARISON: CT AP 1/18/2024. ACCESSION NUMBER(S): [id number] ORDERING CLINICIAN: LAVON GARCIA TECHNIQUE:  Frontal chest was obtained at 12:34 hours. FINDINGS: CARDIOMEDIASTINAL SILHOUETTE: Cardiomediastinal silhouette is normal in size and configuration.  LUNGS: Lungs are clear.  ABDOMEN: No remarkable upper abdominal findings.  BONES: No acute osseous changes.    No acute cardiopulmonary disease.. Signed by Darrell Lopez MD    CT angio chest abdomen pelvis    Result Date: 3/23/2024  Interpreted By:  Grisel Ryan, STUDY: CT ANGIO CHEST ABDOMEN PELVIS; ;  3/23/2024 1:05 pm   INDICATION: Signs/Symptoms:c/f dissection. c/o L cp with numbness down L UE.   COMPARISON: CT abdomen pelvis 01/18/2024   ACCESSION NUMBER(S): [id number]   ORDERING CLINICIAN: LAVON GARCIA   TECHNIQUE: Imaging was performed from thoracic apex through ischial tuberosities prior to and following intravenous administration of 90 mL Omnipaque 350. Postcontrast images or timed for arterial opacification. Multi planer reconstructions were generated at a separate workstation [id number] and MIP reconstructions were generated.   FINDINGS: Minimal athero sclerotic calcification is present in the aortic arch with no other vascular calcification noted in the length of the aorta. There is no aneurysmal dilatation in the aorta, brachiocephalic vessels or iliac arteries. Postcontrast there is no dissection identified in the aorta and there is no focal extravasation of opacify blood.   Brachiocephalic vessels  originate from the aorta in typical fashion and are widely patent. With regard to the left subclavian artery there is no stenosis or evidence of dissection. Celiac and superior mesenteric arteries are widely patent. There are single renal arteries bilaterally which are widely patent. Inferior mesenteric artery and iliac arteries are patent.   Main pulmonary artery is normal in size. Heart is normal in size. Mild coronary calcification is noted, particularly in the LAD. There is no pericardial effusion.   Trachea and mainstem bronchi are patent with no endoluminal mass. Lungs are clear. No pleural effusion is present.   Visible portion of the thyroid appears normal. There is no mediastinal or hilar lymphadenopathy.   Esophagus appears normal.   No mass is noted in the liver. There is no intra or extrahepatic biliary dilatation. Gallbladder is surgically absent.   No mass or inflammation is noted involving the pancreas.   Spleen is normal in size with no focal mass noted.   Adrenal glands appear normal. Kidneys enhance symmetrically with no hydronephrosis noted. Mid posterior left kidney has a 2 cm cyst. No suspicious renal mass is detected   Bowel loops are nondilated. Appendix appears normal. No ascites, pneumoperitoneum or mesenteric inflammation is identified.   Vena cava is normal in size. There are no pathologically enlarged retroperitoneal, iliac or inguinal lymph nodes identified.   Urinary bladder is normal in appearance with no wall thickening.   Uterus is nonenlarged   No abdominal wall hernia is identified.   Degenerative endplate hypertrophy in the spine is relatively mild. More moderate partially bridging spurs are present in the lower thoracic spine. No suspicious osseous lesions are noted.       There is no aneurysm or dissection of the aorta. Left subclavian artery is widely patent.   No acute abnormality is noted in the chest, abdomen or pelvis.     MACRO: None.   Signed by: Grisel Ryan 3/23/2024  1:28 PM Dictation workstation:   [id number]     Results for orders placed or performed during the hospital encounter of 03/23/24 (from the past 24 hour(s))   CBC and Auto Differential   Result Value Ref Range    WBC 6.4 4.4 - 11.3 x10*3/uL    nRBC 0.0 0.0 - 0.0 /100 WBCs    RBC 4.92 4.00 - 5.20 x10*6/uL    Hemoglobin 14.6 12.0 - 16.0 g/dL    Hematocrit 45.4 36.0 - 46.0 %    MCV 92 80 - 100 fL    MCH 29.7 26.0 - 34.0 pg    MCHC 32.2 32.0 - 36.0 g/dL    RDW 13.1 11.5 - 14.5 %    Platelets 245 150 - 450 x10*3/uL    Neutrophils % 63.8 40.0 - 80.0 %    Immature Granulocytes %, Automated 0.2 0.0 - 0.9 %    Lymphocytes % 27.5 13.0 - 44.0 %    Monocytes % 6.5 2.0 - 10.0 %    Eosinophils % 1.7 0.0 - 6.0 %    Basophils % 0.3 0.0 - 2.0 %    Neutrophils Absolute 4.11 1.20 - 7.70 x10*3/uL    Immature Granulocytes Absolute, Automated 0.01 0.00 - 0.70 x10*3/uL    Lymphocytes Absolute 1.77 1.20 - 4.80 x10*3/uL    Monocytes Absolute 0.42 0.10 - 1.00 x10*3/uL    Eosinophils Absolute 0.11 0.00 - 0.70 x10*3/uL    Basophils Absolute 0.02 0.00 - 0.10 x10*3/uL   Comprehensive metabolic panel   Result Value Ref Range    Glucose 106 (H) 74 - 99 mg/dL    Sodium 139 136 - 145 mmol/L    Potassium 4.0 3.5 - 5.3 mmol/L    Chloride 101 98 - 107 mmol/L    Bicarbonate 30 21 - 32 mmol/L    Anion Gap 12 10 - 20 mmol/L    Urea Nitrogen 14 6 - 23 mg/dL    Creatinine 0.72 0.50 - 1.05 mg/dL    eGFR >90 >60 mL/min/1.73m*2    Calcium 9.1 8.6 - 10.3 mg/dL    Albumin 4.2 3.4 - 5.0 g/dL    Alkaline Phosphatase 92 33 - 136 U/L    Total Protein 7.3 6.4 - 8.2 g/dL    AST 18 9 - 39 U/L    Bilirubin, Total 0.9 0.0 - 1.2 mg/dL    ALT 25 7 - 45 U/L   Lipase   Result Value Ref Range    Lipase 19 9 - 82 U/L   Magnesium   Result Value Ref Range    Magnesium 2.28 1.60 - 2.40 mg/dL   Urinalysis with Reflex Microscopic   Result Value Ref Range    Color, Urine Straw Straw, Yellow    Appearance, Urine Clear Clear    Specific Gravity, Urine 1.005 1.005 - 1.035    pH, Urine  6.0 5.0, 5.5, 6.0, 6.5, 7.0, 7.5, 8.0    Protein, Urine NEGATIVE NEGATIVE mg/dL    Glucose, Urine NEGATIVE NEGATIVE mg/dL    Blood, Urine NEGATIVE NEGATIVE    Ketones, Urine NEGATIVE NEGATIVE mg/dL    Bilirubin, Urine NEGATIVE NEGATIVE    Urobilinogen, Urine <2.0 <2.0 mg/dL    Nitrite, Urine NEGATIVE NEGATIVE    Leukocyte Esterase, Urine NEGATIVE NEGATIVE   Troponin I, High Sensitivity, Initial   Result Value Ref Range    Troponin I, High Sensitivity <3 0 - 13 ng/L   B-type natriuretic peptide   Result Value Ref Range    BNP 30 0 - 99 pg/mL   Troponin, High Sensitivity, 1 Hour   Result Value Ref Range    Troponin I, High Sensitivity <3 0 - 13 ng/L   Troponin I, High Sensitivity   Result Value Ref Range    Troponin I, High Sensitivity <3 0 - 13 ng/L   Heparin Assay, UFH   Result Value Ref Range    Heparin Unfractionated 0.5 See Comment Below for Therapeutic Ranges IU/mL            Assessment/Plan    Principal Problem:    Chest pain, unspecified type  Active Problems:    T wave inversion in EKG  Already being seen by cardiology and cardiac workup in place  Continue current txt  Added one time dose morphine and Brilinta  Repeat EKG without any changes   Repeat troponin increased to 7 and will repeat again-f/U  Will place pt on 2 L NC  Chest pain resolved with morphine            Dorie Reyes, APRN-CNP

## 2024-03-25 VITALS
RESPIRATION RATE: 16 BRPM | DIASTOLIC BLOOD PRESSURE: 69 MMHG | WEIGHT: 192 LBS | SYSTOLIC BLOOD PRESSURE: 100 MMHG | BODY MASS INDEX: 30.13 KG/M2 | OXYGEN SATURATION: 98 % | HEIGHT: 67 IN | HEART RATE: 65 BPM | TEMPERATURE: 96.8 F

## 2024-03-25 LAB
ALBUMIN SERPL BCP-MCNC: 3.9 G/DL (ref 3.4–5)
ALP SERPL-CCNC: 79 U/L (ref 33–136)
ALT SERPL W P-5'-P-CCNC: 23 U/L (ref 7–45)
ANION GAP SERPL CALC-SCNC: 9 MMOL/L (ref 10–20)
AST SERPL W P-5'-P-CCNC: 17 U/L (ref 9–39)
BACTERIA UR CULT: NORMAL
BASOPHILS # BLD AUTO: 0.02 X10*3/UL (ref 0–0.1)
BASOPHILS NFR BLD AUTO: 0.3 %
BILIRUB SERPL-MCNC: 1.1 MG/DL (ref 0–1.2)
BUN SERPL-MCNC: 13 MG/DL (ref 6–23)
CALCIUM SERPL-MCNC: 8.8 MG/DL (ref 8.6–10.3)
CHLORIDE SERPL-SCNC: 104 MMOL/L (ref 98–107)
CHOLEST SERPL-MCNC: 146 MG/DL (ref 0–199)
CHOLESTEROL/HDL RATIO: 2.5
CO2 SERPL-SCNC: 32 MMOL/L (ref 21–32)
CREAT SERPL-MCNC: 0.77 MG/DL (ref 0.5–1.05)
EGFRCR SERPLBLD CKD-EPI 2021: 86 ML/MIN/1.73M*2
EOSINOPHIL # BLD AUTO: 0.13 X10*3/UL (ref 0–0.7)
EOSINOPHIL NFR BLD AUTO: 1.9 %
ERYTHROCYTE [DISTWIDTH] IN BLOOD BY AUTOMATED COUNT: 13.2 % (ref 11.5–14.5)
GLUCOSE SERPL-MCNC: 94 MG/DL (ref 74–99)
HCT VFR BLD AUTO: 45.5 % (ref 36–46)
HDLC SERPL-MCNC: 57.3 MG/DL
HGB BLD-MCNC: 14.4 G/DL (ref 12–16)
IMM GRANULOCYTES # BLD AUTO: 0.02 X10*3/UL (ref 0–0.7)
IMM GRANULOCYTES NFR BLD AUTO: 0.3 % (ref 0–0.9)
LDLC SERPL CALC-MCNC: 71 MG/DL
LYMPHOCYTES # BLD AUTO: 2.64 X10*3/UL (ref 1.2–4.8)
LYMPHOCYTES NFR BLD AUTO: 38.7 %
MCH RBC QN AUTO: 29.4 PG (ref 26–34)
MCHC RBC AUTO-ENTMCNC: 31.6 G/DL (ref 32–36)
MCV RBC AUTO: 93 FL (ref 80–100)
MONOCYTES # BLD AUTO: 0.53 X10*3/UL (ref 0.1–1)
MONOCYTES NFR BLD AUTO: 7.8 %
NEUTROPHILS # BLD AUTO: 3.48 X10*3/UL (ref 1.2–7.7)
NEUTROPHILS NFR BLD AUTO: 51 %
NON HDL CHOLESTEROL: 89 MG/DL (ref 0–149)
NRBC BLD-RTO: 0 /100 WBCS (ref 0–0)
PLATELET # BLD AUTO: 258 X10*3/UL (ref 150–450)
POTASSIUM SERPL-SCNC: 4.1 MMOL/L (ref 3.5–5.3)
PROT SERPL-MCNC: 6.6 G/DL (ref 6.4–8.2)
RBC # BLD AUTO: 4.89 X10*6/UL (ref 4–5.2)
SODIUM SERPL-SCNC: 141 MMOL/L (ref 136–145)
TRIGL SERPL-MCNC: 89 MG/DL (ref 0–149)
VLDL: 18 MG/DL (ref 0–40)
WBC # BLD AUTO: 6.8 X10*3/UL (ref 4.4–11.3)

## 2024-03-25 PROCEDURE — 80061 LIPID PANEL: CPT | Performed by: NURSE PRACTITIONER

## 2024-03-25 PROCEDURE — 4A023N7 MEASUREMENT OF CARDIAC SAMPLING AND PRESSURE, LEFT HEART, PERCUTANEOUS APPROACH: ICD-10-PCS | Performed by: INTERNAL MEDICINE

## 2024-03-25 PROCEDURE — 2500000001 HC RX 250 WO HCPCS SELF ADMINISTERED DRUGS (ALT 637 FOR MEDICARE OP): Performed by: NURSE PRACTITIONER

## 2024-03-25 PROCEDURE — 2500000005 HC RX 250 GENERAL PHARMACY W/O HCPCS: Performed by: INTERNAL MEDICINE

## 2024-03-25 PROCEDURE — 99239 HOSP IP/OBS DSCHRG MGMT >30: CPT | Performed by: STUDENT IN AN ORGANIZED HEALTH CARE EDUCATION/TRAINING PROGRAM

## 2024-03-25 PROCEDURE — 99152 MOD SED SAME PHYS/QHP 5/>YRS: CPT | Performed by: INTERNAL MEDICINE

## 2024-03-25 PROCEDURE — 2550000001 HC RX 255 CONTRASTS: Performed by: INTERNAL MEDICINE

## 2024-03-25 PROCEDURE — C1894 INTRO/SHEATH, NON-LASER: HCPCS | Performed by: INTERNAL MEDICINE

## 2024-03-25 PROCEDURE — 2500000002 HC RX 250 W HCPCS SELF ADMINISTERED DRUGS (ALT 637 FOR MEDICARE OP, ALT 636 FOR OP/ED): Performed by: NURSE PRACTITIONER

## 2024-03-25 PROCEDURE — C1887 CATHETER, GUIDING: HCPCS | Performed by: INTERNAL MEDICINE

## 2024-03-25 PROCEDURE — 93458 L HRT ARTERY/VENTRICLE ANGIO: CPT | Performed by: INTERNAL MEDICINE

## 2024-03-25 PROCEDURE — 85025 COMPLETE CBC W/AUTO DIFF WBC: CPT | Performed by: STUDENT IN AN ORGANIZED HEALTH CARE EDUCATION/TRAINING PROGRAM

## 2024-03-25 PROCEDURE — 2500000004 HC RX 250 GENERAL PHARMACY W/ HCPCS (ALT 636 FOR OP/ED): Performed by: INTERNAL MEDICINE

## 2024-03-25 PROCEDURE — B2111ZZ FLUOROSCOPY OF MULTIPLE CORONARY ARTERIES USING LOW OSMOLAR CONTRAST: ICD-10-PCS | Performed by: INTERNAL MEDICINE

## 2024-03-25 PROCEDURE — 7100000010 HC PHASE TWO TIME - EACH INCREMENTAL 1 MINUTE: Performed by: INTERNAL MEDICINE

## 2024-03-25 PROCEDURE — 7100000009 HC PHASE TWO TIME - INITIAL BASE CHARGE: Performed by: INTERNAL MEDICINE

## 2024-03-25 PROCEDURE — 2780000003 HC OR 278 NO HCPCS: Performed by: INTERNAL MEDICINE

## 2024-03-25 PROCEDURE — 84075 ASSAY ALKALINE PHOSPHATASE: CPT | Performed by: STUDENT IN AN ORGANIZED HEALTH CARE EDUCATION/TRAINING PROGRAM

## 2024-03-25 PROCEDURE — 2720000007 HC OR 272 NO HCPCS: Performed by: INTERNAL MEDICINE

## 2024-03-25 PROCEDURE — C1760 CLOSURE DEV, VASC: HCPCS | Performed by: INTERNAL MEDICINE

## 2024-03-25 PROCEDURE — 36415 COLL VENOUS BLD VENIPUNCTURE: CPT | Performed by: STUDENT IN AN ORGANIZED HEALTH CARE EDUCATION/TRAINING PROGRAM

## 2024-03-25 PROCEDURE — 2500000001 HC RX 250 WO HCPCS SELF ADMINISTERED DRUGS (ALT 637 FOR MEDICARE OP)

## 2024-03-25 PROCEDURE — G0269 OCCLUSIVE DEVICE IN VEIN ART: HCPCS | Mod: TC | Performed by: INTERNAL MEDICINE

## 2024-03-25 RX ORDER — FENTANYL CITRATE 50 UG/ML
INJECTION, SOLUTION INTRAMUSCULAR; INTRAVENOUS AS NEEDED
Status: DISCONTINUED | OUTPATIENT
Start: 2024-03-25 | End: 2024-03-25 | Stop reason: HOSPADM

## 2024-03-25 RX ORDER — LIDOCAINE HYDROCHLORIDE 20 MG/ML
INJECTION, SOLUTION INFILTRATION; PERINEURAL AS NEEDED
Status: DISCONTINUED | OUTPATIENT
Start: 2024-03-25 | End: 2024-03-25 | Stop reason: HOSPADM

## 2024-03-25 RX ORDER — MIDAZOLAM HYDROCHLORIDE 1 MG/ML
INJECTION, SOLUTION INTRAMUSCULAR; INTRAVENOUS AS NEEDED
Status: DISCONTINUED | OUTPATIENT
Start: 2024-03-25 | End: 2024-03-25 | Stop reason: HOSPADM

## 2024-03-25 RX ORDER — SODIUM CHLORIDE 9 MG/ML
INJECTION, SOLUTION INTRAVENOUS CONTINUOUS PRN
Status: COMPLETED | OUTPATIENT
Start: 2024-03-25 | End: 2024-03-25

## 2024-03-25 RX ORDER — SODIUM CHLORIDE 9 MG/ML
1 INJECTION, SOLUTION INTRAVENOUS CONTINUOUS
Status: CANCELLED | OUTPATIENT
Start: 2024-03-25 | End: 2024-03-25

## 2024-03-25 RX ORDER — PANTOPRAZOLE SODIUM 40 MG/1
40 TABLET, DELAYED RELEASE ORAL
Qty: 30 TABLET | Refills: 0 | Status: SHIPPED | OUTPATIENT
Start: 2024-03-25 | End: 2024-03-28 | Stop reason: SDUPTHER

## 2024-03-25 RX ORDER — PANTOPRAZOLE SODIUM 40 MG/1
40 TABLET, DELAYED RELEASE ORAL
Status: DISCONTINUED | OUTPATIENT
Start: 2024-03-25 | End: 2024-03-25 | Stop reason: HOSPADM

## 2024-03-25 RX ADMIN — ASPIRIN 81 MG CHEWABLE TABLET 81 MG: 81 TABLET CHEWABLE at 09:00

## 2024-03-25 RX ADMIN — TICAGRELOR 90 MG: 90 TABLET ORAL at 09:00

## 2024-03-25 RX ADMIN — PANTOPRAZOLE SODIUM 40 MG: 40 TABLET, DELAYED RELEASE ORAL at 12:15

## 2024-03-25 ASSESSMENT — PAIN SCALES - GENERAL
PAINLEVEL_OUTOF10: 0 - NO PAIN

## 2024-03-25 ASSESSMENT — COGNITIVE AND FUNCTIONAL STATUS - GENERAL
DAILY ACTIVITIY SCORE: 24
MOBILITY SCORE: 24

## 2024-03-25 ASSESSMENT — PAIN - FUNCTIONAL ASSESSMENT
PAIN_FUNCTIONAL_ASSESSMENT: 0-10

## 2024-03-25 NOTE — PROGRESS NOTES
03/25/24 1127   Discharge Planning   Living Arrangements Friends   Support Systems Friends/neighbors   Assistance Needed A&Ox3, independent with ADLs, no DME, drives, room air at baseline   Type of Residence Private residence   Number of Stairs to Enter Residence 3   Number of Stairs Within Residence 10   Do you have animals or pets at home? Yes   Type of Animals or Pets 4 dogs, 1 cat   Home or Post Acute Services None   Patient expects to be discharged to: Home no needs   Does the patient need discharge transport arranged? No

## 2024-03-25 NOTE — POST-PROCEDURE NOTE
Physician Transition of Care Summary  Invasive Cardiovascular Lab    Procedure Date: 3/25/2024  Attending:    * Amador Ortiz - Primary  Resident/Fellow/Other Assistant: Surgeon(s) and Role:    Indications:   Pre-op Diagnosis     * Chest pain, unspecified type [R07.9]    Post-procedure diagnosis:   Post-op Diagnosis     * Chest pain, unspecified type [R07.9]    Procedure(s):   Left Heart Cath  88345 - SD CATH PLMT L HRT & ARTS W/NJX & ANGIO IMG S&I        Procedure Findings:   Right dominant, normal coronary arteries    Description of the Procedure:   [facility]    Complications:   None    Stents/Implants:       Anticoagulation/Antiplatelet Plan:   NOne    Estimated Blood Loss:   5 mL    Anesthesia: Moderate Sedation Anesthesia Staff: No anesthesia staff entered.    Any Specimen(s) Removed:   No specimens collected during this procedure.    Disposition:   Post femoral cardiac cath order set.      Electronically signed by: Amador Ortiz MD, 3/25/2024 9:24 AM

## 2024-03-25 NOTE — DISCHARGE INSTRUCTIONS

## 2024-03-25 NOTE — DISCHARGE SUMMARY
Discharge Diagnosis  Atypical Chest pain    Issues Requiring Follow-Up  Post hospital follow up    Discharge Meds     Your medication list        START taking these medications        Instructions Last Dose Given Next Dose Due   pantoprazole 40 mg EC tablet  Commonly known as: ProtoNix      Take 1 tablet (40 mg) by mouth once daily in the morning. Take before meals. Do not crush, chew, or split.              CONTINUE taking these medications        Instructions Last Dose Given Next Dose Due   ascorbic acid 500 mg tablet  Commonly known as: Vitamin C           cholecalciferol 25 MCG (1000 UT) tablet  Commonly known as: Vitamin D-3           cyanocobalamin 1,000 mcg tablet  Commonly known as: Vitamin B-12           simvastatin 20 mg tablet  Commonly known as: Zocor      Take 1 tablet (20 mg) by mouth once daily at bedtime.       vitamin E 180 mg (400 unit) capsule                  STOP taking these medications      aspirin 81 mg EC tablet        ibuprofen 800 mg tablet                  Where to Get Your Medications        These medications were sent to [id number] - [place], [place] - [address]  [address] [address]      Phone: [phone]   pantoprazole 40 mg EC tablet         Test Results Pending At Discharge  Pending Labs       No current pending labs.            Hospital Course   Reanna Pineda is a 63 y.o. year old female with PMH HLD presented to the ED for worsening chest pain. Cardio was consulted. LHC showed NAP. Her CP could be GI etiology. Protonix is started. CP improved. Pt is hemodynamically stable for discharge at this time with close outpatient follow ups.     The patient was discharged in satisfactory condition.   Medications and side effect profile reviewed with patient.  More than 30 minutes were spent in coordinating patient discharge     Pertinent Physical Exam At Time of Discharge  Physical Exam  Vitals and nursing note reviewed.   Constitutional:        General: She is not in acute distress.     Appearance: Normal appearance. She is not ill-appearing or toxic-appearing.   HENT:      Head: Normocephalic and atraumatic.      Mouth/Throat:      Mouth: Mucous membranes are moist.   Eyes:      Extraocular Movements: Extraocular movements intact.      Conjunctiva/sclera: Conjunctivae normal.      Pupils: Pupils are equal, round, and reactive to light.   Cardiovascular:      Rate and Rhythm: Normal rate and regular rhythm.      Heart sounds: No murmur heard.     No gallop.   Pulmonary:      Effort: Pulmonary effort is normal. No respiratory distress.      Breath sounds: Normal breath sounds. No wheezing, rhonchi or rales.   Abdominal:      General: Abdomen is flat. Bowel sounds are normal. There is no distension.      Palpations: Abdomen is soft. There is no mass.      Tenderness: There is no abdominal tenderness.   Musculoskeletal:         General: No swelling or tenderness. Normal range of motion.      Cervical back: Normal range of motion and neck supple.   Skin:     General: Skin is warm and dry.   Neurological:      General: No focal deficit present.      Mental Status: She is alert and oriented to person, place, and time. Mental status is at baseline.   Psychiatric:         Mood and Affect: Mood normal.         Behavior: Behavior normal.         Thought Content: Thought content normal.         Judgment: Judgment normal.     Outpatient Follow-Up  Future Appointments   Date Time Provider Department Center   5/28/2024 10:45 AM Randy Davis MD [id number] [facility]         Kamala Bello MD  Hospitalist

## 2024-03-25 NOTE — PROGRESS NOTES
Reanna Pineda is a 63 y.o. female on day 2 of admission presenting with Chest pain, unspecified type.      Subjective   She is resting in the bed, now s/p LHC which showed normal coronary arteries. No further chest pain since yesterday.       Review of systems:  Constitutional: negative for fever, chills, or malaise  Neuro: negative for dizziness, headache, numbness, tingling  ENT: Negative for nasal congestion or sore throat  Resp: negative for shortness of breath, cough, or wheezing  CV: negative for palpitations  GI: negative for abd pain, nausea, vomiting or diarrhea  : negative for dysuria, frequency, or urgency  Skin: negative for lesions, wounds, or rash  Musculoskeletal: Negative for weakness, myalgia, or arthralgia  Endocrine: Negative for polyuria or polydipsia         Objective   Constitutional: Well developed, awake/alert/oriented x3, no distress, alert and cooperative  Eyes: PERRL, EOMI, clear sclera  ENMT: mucous membranes moist, no apparent injury, no lesions seen  Head/Neck: Neck supple, no apparent injury, thyroid without mass or tenderness, No JVD, trachea midline, no bruits  Respiratory/Thorax: Patent airways, CTAB, normal breath sounds with good chest expansion, thorax symmetric  Cardiovascular: Regular, rate and rhythm, no murmurs, 2+ equal pulses of the extremities, normal S 1and S 2  Gastrointestinal: Nondistended, soft, non-tender, no rebound tenderness or guarding, no masses palpable, no organomegaly, +BS, no bruits  Musculoskeletal: ROM intact, no joint swelling, normal strength  Extremities: normal extremities, no cyanosis edema, contusions or wounds, no clubbing  Neurological: alert and oriented x3, intact senses, motor, response and reflexes, normal strength  Lymphatic: No significant lymphadenopathy  Psychological: Appropriate mood and behavior  Skin: Warm and dry, cath site dressing to right groin dry and intact      Last Recorded Vitals  /69   Pulse 65   Temp 36 °C (96.8  "°F) (Temporal)   Resp 16   Ht 1.702 m (5' 7\")   Wt 87.1 kg (192 lb)   SpO2 98%   BMI 30.07 kg/m²     Intake/Output last 3 Shifts:  I/O last 3 completed shifts:  In: 765.8 (8.8 mL/kg) [P.O.:480; I.V.:285.8 (3.3 mL/kg)]  Out: - (0 mL/kg)   Weight: 87.1 kg   I/O this shift:  In: -   Out: 5 [Blood:5]    Relevant Results  Scheduled medications  aspirin, 81 mg, oral, Daily  atorvastatin, 80 mg, oral, Nightly  docusate sodium, 100 mg, oral, BID  magnesium hydroxide, 10 mL, oral, Once  oxygen, , inhalation, Continuous - Inhalation  pantoprazole, 40 mg, oral, Daily before breakfast      Continuous medications       PRN medications  PRN medications: nitroglycerin    Results for orders placed or performed during the hospital encounter of 03/23/24 (from the past 24 hour(s))   Comprehensive Metabolic Panel   Result Value Ref Range    Glucose 94 74 - 99 mg/dL    Sodium 141 136 - 145 mmol/L    Potassium 4.1 3.5 - 5.3 mmol/L    Chloride 104 98 - 107 mmol/L    Bicarbonate 32 21 - 32 mmol/L    Anion Gap 9 (L) 10 - 20 mmol/L    Urea Nitrogen 13 6 - 23 mg/dL    Creatinine 0.77 0.50 - 1.05 mg/dL    eGFR 86 >60 mL/min/1.73m*2    Calcium 8.8 8.6 - 10.3 mg/dL    Albumin 3.9 3.4 - 5.0 g/dL    Alkaline Phosphatase 79 33 - 136 U/L    Total Protein 6.6 6.4 - 8.2 g/dL    AST 17 9 - 39 U/L    Bilirubin, Total 1.1 0.0 - 1.2 mg/dL    ALT 23 7 - 45 U/L   CBC and Auto Differential   Result Value Ref Range    WBC 6.8 4.4 - 11.3 x10*3/uL    nRBC 0.0 0.0 - 0.0 /100 WBCs    RBC 4.89 4.00 - 5.20 x10*6/uL    Hemoglobin 14.4 12.0 - 16.0 g/dL    Hematocrit 45.5 36.0 - 46.0 %    MCV 93 80 - 100 fL    MCH 29.4 26.0 - 34.0 pg    MCHC 31.6 (L) 32.0 - 36.0 g/dL    RDW 13.2 11.5 - 14.5 %    Platelets 258 150 - 450 x10*3/uL    Neutrophils % 51.0 40.0 - 80.0 %    Immature Granulocytes %, Automated 0.3 0.0 - 0.9 %    Lymphocytes % 38.7 13.0 - 44.0 %    Monocytes % 7.8 2.0 - 10.0 %    Eosinophils % 1.9 0.0 - 6.0 %    Basophils % 0.3 0.0 - 2.0 %    Neutrophils " Absolute 3.48 1.20 - 7.70 x10*3/uL    Immature Granulocytes Absolute, Automated 0.02 0.00 - 0.70 x10*3/uL    Lymphocytes Absolute 2.64 1.20 - 4.80 x10*3/uL    Monocytes Absolute 0.53 0.10 - 1.00 x10*3/uL    Eosinophils Absolute 0.13 0.00 - 0.70 x10*3/uL    Basophils Absolute 0.02 0.00 - 0.10 x10*3/uL   Lipid Panel   Result Value Ref Range    Cholesterol 146 0 - 199 mg/dL    HDL-Cholesterol 57.3 mg/dL    Cholesterol/HDL Ratio 2.5     LDL Calculated 71 <=99 mg/dL    VLDL 18 0 - 40 mg/dL    Triglycerides 89 0 - 149 mg/dL    Non HDL Cholesterol 89 0 - 149 mg/dL       CT angio chest abdomen pelvis   Final Result   There is no aneurysm or dissection of the aorta. Left subclavian   artery is widely patent.        No acute abnormality is noted in the chest, abdomen or pelvis.             MACRO:   None.        Signed by: Grisel Ryan 3/23/2024 1:28 PM   Dictation workstation:   [id number]      XR chest 1 view   Final Result   No acute cardiopulmonary disease..   Signed by Darrell Lopez MD      Point of Care Ultrasound    (Results Pending)   Cardiac Catheterization Procedure    (Results Pending)       No echocardiogram results found for the past 12 months         Assessment/Plan   Principal Problem:    Chest pain, unspecified type  Active Problems:    T wave inversion in EKG    3/6/2024 Stress Test  Equivocal study for mild inferolateral and septal ischemia, normal LV systolic function. Normal functional capacity, no angina or arrhythmias or ischemic ECG changes.     3/5/2024 Echocardiogram  LVEF 60%. Normal valvular structure and function. Abnormal left ventricular relaxation. Unable to estimate pulmonary arterial systolic pressure. Normal estimated CVP.     Chest Pain  -Troponin negative  -I reviewed the 12 lead EKG, T wave inversions in   V3-V6, and inferior leads.   -CXR  negative  -I reviewed the echocardiogram from above  -Stress test concerning for ischemia as above  - lipid panel reviewed  - s/p [facility] today showed  normal coronary arteries  -Stop asa and brilinta  -lipid panel reviewed  -Cont PPI  -Suggest follow up with GI as outpt to rule out other causes of pain     Hyperlipidemia  - Continue atorvastatin  - Lipids from Dec 2023 reviewed, , Chol: 216, T  - Repeat lipid reviewed     Dizziness  - monitor on tele  - outpt ambulatory monitor  - MRI/MRA head/ neck reviewed from 24 and were negative for significant intracranial stenosis or aneurysm      Eli Escobar, APRN-CNP

## 2024-03-25 NOTE — NURSING NOTE
1003: Received report from Preeti in cath lab, pt had left heart cath via R femoral access. Minx closure at 0921.

## 2024-03-25 NOTE — INTERVAL H&P NOTE
H&P reviewed. The patient was examined and there are no changes to the H&P.    Risks, benefits, and alternatives discussed in full. Written informed consent obtained and placed in the chart. Proceed with [facility].

## 2024-03-26 ENCOUNTER — APPOINTMENT (OUTPATIENT)
Dept: SURGERY | Facility: CLINIC | Age: 64
End: 2024-03-26
Payer: COMMERCIAL

## 2024-03-26 ENCOUNTER — TELEPHONE (OUTPATIENT)
Dept: PRIMARY CARE | Facility: CLINIC | Age: 64
End: 2024-03-26
Payer: COMMERCIAL

## 2024-03-26 NOTE — TELEPHONE ENCOUNTER
Transition of Care    Inpatient facility: [facility]  Discharge diagnosis: atypical chest pain  Discharged to: home  Discharge date: 03/25/24  Initial Call date: 03/26/24  Spoke with patient/caregiver: patient                                                                      Do you need assistance  visits prior to your PCP visit: No  Home health care ordered: No  Have you been contacted by home care and have a start of care date: No  Are you taking medications as prescribed at discharge: Yes    Referral to [facility] Pharmacist: No  Patient advised to bring all medications to PCP follow-up appointment.  Patient advised to follow discharge instructions until provider follow-up.  TCM visit date: 03/28/24  TCM provider visit with: Dr. Randy Davis    TCM visit must be scheduled to occur within 14 days of discharge (included DC date).  When possible TCM visit should be scheduled within 7 days.: Visit schedule within 7 days of discharge

## 2024-03-26 NOTE — SIGNIFICANT EVENT
Cath Lab Procedure Call Back  Procedure Date: _____3/26/2024__________   Procedure Performed:__________LHC__________  Physician:__Dr.Oommen_____________  Spoke with:_____________________________  Date/Time Contacted: 1st attempt: _________ ___:____ 2nd attempt: _________ ___:____  Phone#:_______________ Unable to reach/Left message:__X__________  Access Site: Pain______Bleeding______Bruised______Infection______WNL______  Dressing Removal Date:______________ Anticoagulation Post Intervention_________  Satisfied with Care:________________ D/C Instructions adequate_______________  Follow Up Appointment:_____________________ Length of Call:______2min____________  Comments:_______Unable to reach the pt , so I left a voice message. _______________________________________________________________________________________________________________________________________

## 2024-03-27 ENCOUNTER — PATIENT OUTREACH (OUTPATIENT)
Dept: CARE COORDINATION | Facility: CLINIC | Age: 64
End: 2024-03-27
Payer: COMMERCIAL

## 2024-03-27 NOTE — SIGNIFICANT EVENT
Follow Up Phone Call    Outgoing phone call    Spoke to: Reanna DAIANA Pineda Relationship:self   Phone number: [phone]      Outcome: contacted patient/ family   Chief Complaint   Patient presents with    Chest Pain          Diagnosis:Not applicable    States she is feeling a little better. Still has GI symptoms, the need to burp feeling. Will discuss with PCP tomorrow.  No further questions or concerns.

## 2024-03-27 NOTE — PROGRESS NOTES
Outreach call to patient to support a smooth transition of care from recent admission.  Spoke with patient, reviewed discharge medications, discharge instructions, assessed social needs, and provided education on importance of follow-up appointment with provider. Enrolled patient in [employer] chatbot for additional support and education through transition period.  Will continue to monitor through transition period.  Medications  Medications reviewed with patient/caregiver?: Yes (3/27/2024 11:32 AM)  Is the patient having any side effects they believe may be caused by any medication additions or changes?: No (3/27/2024 11:32 AM)  Does the patient have all medications ordered at discharge?: Yes (3/27/2024 11:32 AM)  Care Management Interventions: No intervention needed (3/27/2024 11:32 AM)  Is the patient taking all medications as directed (includes completed medication regime)?: Yes (3/27/2024 11:32 AM)    Appointments  Care Management Interventions: Verified appointment date/time/provider (3/27/2024 11:32 AM)    Patient Teaching  Does the patient have access to their discharge instructions?: Yes (3/27/2024 11:32 AM)  Care Management Interventions: Reviewed instructions with patient (3/27/2024 11:32 AM)  What is the patient's perception of their health status since discharge?: Improving (3/27/2024 11:32 AM)      alvaro

## 2024-03-28 ENCOUNTER — OFFICE VISIT (OUTPATIENT)
Dept: PRIMARY CARE | Facility: CLINIC | Age: 64
End: 2024-03-28
Payer: COMMERCIAL

## 2024-03-28 VITALS
TEMPERATURE: 97.2 F | BODY MASS INDEX: 31.26 KG/M2 | OXYGEN SATURATION: 98 % | SYSTOLIC BLOOD PRESSURE: 112 MMHG | HEART RATE: 87 BPM | DIASTOLIC BLOOD PRESSURE: 70 MMHG | WEIGHT: 199.6 LBS

## 2024-03-28 DIAGNOSIS — R00.2 PALPITATIONS: ICD-10-CM

## 2024-03-28 DIAGNOSIS — Z78.9 ALCOHOL USE: ICD-10-CM

## 2024-03-28 DIAGNOSIS — E78.00 HYPERCHOLESTEROLEMIA: ICD-10-CM

## 2024-03-28 DIAGNOSIS — R07.89 ATYPICAL CHEST PAIN: Primary | ICD-10-CM

## 2024-03-28 DIAGNOSIS — R07.9 CHEST PAIN, UNSPECIFIED TYPE: ICD-10-CM

## 2024-03-28 DIAGNOSIS — K21.9 GASTROESOPHAGEAL REFLUX DISEASE WITHOUT ESOPHAGITIS: ICD-10-CM

## 2024-03-28 DIAGNOSIS — Z12.31 ENCOUNTER FOR SCREENING MAMMOGRAM FOR MALIGNANT NEOPLASM OF BREAST: ICD-10-CM

## 2024-03-28 PROCEDURE — 1036F TOBACCO NON-USER: CPT | Performed by: INTERNAL MEDICINE

## 2024-03-28 PROCEDURE — 99496 TRANSJ CARE MGMT HIGH F2F 7D: CPT | Performed by: INTERNAL MEDICINE

## 2024-03-28 RX ORDER — PANTOPRAZOLE SODIUM 40 MG/1
40 TABLET, DELAYED RELEASE ORAL
Qty: 90 TABLET | Refills: 0 | Status: SHIPPED | OUTPATIENT
Start: 2024-03-28 | End: 2024-05-08

## 2024-03-28 ASSESSMENT — ENCOUNTER SYMPTOMS
BRUISES/BLEEDS EASILY: 0
DIFFICULTY URINATING: 0
BLOOD IN STOOL: 0
FEVER: 0
ARTHRALGIAS: 0
DIZZINESS: 0
SORE THROAT: 0
COUGH: 0
ABDOMINAL PAIN: 0
HEADACHES: 0
SINUS PAIN: 0
PALPITATIONS: 0
FATIGUE: 0
UNEXPECTED WEIGHT CHANGE: 0
WHEEZING: 0
DIARRHEA: 0

## 2024-03-28 NOTE — PROGRESS NOTES
Subjective   Patient ID: Reanna Pineda is a 63 y.o. female who presents for Hospital Follow-up (TCM, hospital follow up for atypical chest pain, discharged on  3/25/24).    Transition of care  Patient admission history and physical, hospital course, medications, verified and reviewed  Patient contacted after discharge, medications verified comes today for follow-up    Inpatient facility: [facility] diagnosis: atypical chest pain  Discharged to: home  Discharge date: 03/25/24  Initial Call date: 03/26/24  Spoke with patient/caregiver: patient                                                                       Do you need assistance  visits prior to your PCP visit: No  Home health care ordered: No  Have you been contacted by home care and have a start of care date: No  Are you taking medications as prescribed at discharge: Yes     Referral to [facility] Pharmacist: No  Patient advised to bring all medications to PCP follow-up appointment.  Patient advised to follow discharge instructions until provider follow-up.  TCM visit date: 03/28/24  TCM provider visit with: Dr. Randy Davis     TCM visit must be scheduled to occur within 14 days of discharge (included DC date).  When possible TCM visit should be scheduled within 7 days.: Visit schedule within 7 days of discharge     High complexity patient  Electronically signed by Reva Slaughter CMA at 3/26/2024 10:53 AM      Patient admitted to the hospital for chest pressure radiating to the left arm patient with abnormal EKG was supposed to have cardiac catheterization for further eval recommendation abnormal EKG  Patient transferred to [facility] underwent cardiac catheterization which came back normal results maximize medical management underlying chest pain due to reflux disease    - Status postcardiac catheterization no significant findings known coronary artery disease significant at this time continue with cholesterol medication  maximize max medical management  -Reflux disease symptoms continues Protonix 40 mg daily patient declined endoscopy at this time will reevaluate patient in 4 weeks for further recommendation  - Patient using multi vitamins with good compliance patient sent to discontinue all vitamins to take only vitamin B12 and vitamin D in addition to Centrum Silver daily  -- Hypercholesterolemia patient counseled about maximizing low-fat diet low triglyceride diet  Counseled about weight loss  Continue simvastatin  -Needs to proceed with perianal fistula surgery as recommended  -Screening colonoscopy done in 2021 need to repeat in 2026  -Patient consumes large amount of beer on a weekly basis counseled about alcohol abstinence.  Counseled about reflux disease and hiatal hernia possibility patient aware will readdress symptoms again after cardiac event and surgical intervention for perianal.           Review of Systems   Constitutional:  Negative for fatigue, fever and unexpected weight change.   HENT:  Negative for congestion, ear discharge, ear pain, mouth sores, sinus pain and sore throat.    Eyes:  Negative for visual disturbance.   Respiratory:  Negative for cough and wheezing.    Cardiovascular:  Negative for chest pain, palpitations and leg swelling.   Gastrointestinal:  Negative for abdominal pain, blood in stool and diarrhea.   Genitourinary:  Negative for difficulty urinating.   Musculoskeletal:  Negative for arthralgias.   Skin:  Negative for rash.   Neurological:  Negative for dizziness and headaches.   Hematological:  Does not bruise/bleed easily.   Psychiatric/Behavioral:  Negative for behavioral problems.    All other systems reviewed and are negative.      Objective   Lab Results   Component Value Date    HGBA1C 5.9 (H) 01/18/2024      /70   Pulse 87   Temp 36.2 °C (97.2 °F)   Wt 90.5 kg (199 lb 9.6 oz)   SpO2 98%   BMI 31.26 kg/m²     Physical Exam  Vitals and nursing note reviewed.   Constitutional:        Appearance: Normal appearance.   HENT:      Head: Normocephalic.      Nose: Nose normal.   Eyes:      Conjunctiva/sclera: Conjunctivae normal.      Pupils: Pupils are equal, round, and reactive to light.   Cardiovascular:      Rate and Rhythm: Regular rhythm.   Pulmonary:      Effort: Pulmonary effort is normal.      Breath sounds: Normal breath sounds.   Abdominal:      General: Abdomen is flat.      Palpations: Abdomen is soft.   Musculoskeletal:      Cervical back: Neck supple.   Skin:     General: Skin is warm.   Neurological:      General: No focal deficit present.      Mental Status: She is oriented to person, place, and time.   Psychiatric:         Mood and Affect: Mood normal.         Assessment/Plan   Reanna was seen today for hospital follow-up.  Diagnoses and all orders for this visit:  Atypical chest pain (Primary)  Gastroesophageal reflux disease without esophagitis  -     pantoprazole (ProtoNix) 40 mg EC tablet; Take 1 tablet (40 mg) by mouth once daily in the morning. Take before meals. Do not crush, chew, or split.  Alcohol use  Palpitations  Chest pain, unspecified type  Hypercholesterolemia  Encounter for screening mammogram for malignant neoplasm of breast  -     BI mammo bilateral screening tomosynthesis; Future   Transition of care  Patient admission history and physical, hospital course, medications, verified and reviewed  Patient contacted after discharge, medications verified comes today for follow-up    Inpatient facility: [facility]  Discharge diagnosis: atypical chest pain  Discharged to: home  Discharge date: 03/25/24  Initial Call date: 03/26/24  Spoke with patient/caregiver: patient                                                                       Do you need assistance  visits prior to your PCP visit: No  Home health care ordered: No  Have you been contacted by home care and have a start of care date: No  Are you taking medications as prescribed at discharge:  Yes     Referral to [facility] Pharmacist: No  Patient advised to bring all medications to PCP follow-up appointment.  Patient advised to follow discharge instructions until provider follow-up.  TCM visit date: 03/28/24  TCM provider visit with: Dr. Randy Davis     TCM visit must be scheduled to occur within 14 days of discharge (included DC date).  When possible TCM visit should be scheduled within 7 days.: Visit schedule within 7 days of discharge     High complexity patient  Electronically signed by Reva Slaughter CMA at 3/26/2024 10:53 AM      Patient admitted to the hospital for chest pressure radiating to the left arm patient with abnormal EKG was supposed to have cardiac catheterization for further eval recommendation abnormal EKG  Patient transferred to [facility] underwent cardiac catheterization which came back normal results maximize medical management underlying chest pain due to reflux disease    - Status postcardiac catheterization no significant findings known coronary artery disease significant at this time continue with cholesterol medication maximize max medical management  -Reflux disease symptoms continues Protonix 40 mg daily patient declined endoscopy at this time will reevaluate patient in 4 weeks for further recommendation  - Patient using multi vitamins with good compliance patient sent to discontinue all vitamins to take only vitamin B12 and vitamin D in addition to Centrum Silver daily  -- Hypercholesterolemia patient counseled about maximizing low-fat diet low triglyceride diet  Counseled about weight loss  Continue simvastatin  -Needs to proceed with perianal fistula surgery as recommended  -Screening colonoscopy done in 2021 need to repeat in 2026  -Patient consumes large amount of beer on a weekly basis counseled about alcohol abstinence.  Counseled about reflux disease and hiatal hernia possibility patient aware will readdress symptoms again after cardiac event and surgical  intervention for perianal.

## 2024-03-29 ENCOUNTER — PATIENT OUTREACH (OUTPATIENT)
Dept: CARE COORDINATION | Facility: CLINIC | Age: 64
End: 2024-03-29
Payer: COMMERCIAL

## 2024-03-29 LAB
ATRIAL RATE: 80 BPM
ATRIAL RATE: 85 BPM
P AXIS: 12 DEGREES
P AXIS: 44 DEGREES
P OFFSET: 190 MS
P OFFSET: 190 MS
P ONSET: 130 MS
P ONSET: 132 MS
PR INTERVAL: 168 MS
PR INTERVAL: 180 MS
Q ONSET: 216 MS
Q ONSET: 220 MS
QRS COUNT: 13 BEATS
QRS COUNT: 14 BEATS
QRS DURATION: 94 MS
QRS DURATION: 94 MS
QT INTERVAL: 358 MS
QT INTERVAL: 402 MS
QTC CALCULATION(BAZETT): 412 MS
QTC CALCULATION(BAZETT): 478 MS
QTC FREDERICIA: 394 MS
QTC FREDERICIA: 451 MS
R AXIS: 108 DEGREES
R AXIS: 15 DEGREES
T AXIS: -19 DEGREES
T AXIS: 16 DEGREES
T OFFSET: 395 MS
T OFFSET: 421 MS
VENTRICULAR RATE: 80 BPM
VENTRICULAR RATE: 85 BPM

## 2024-03-29 NOTE — PROGRESS NOTES
Attempted outreach to follow up on the doctors appointment post discharge.  Left a voice message with my contact information.  alvaro

## 2024-04-16 ENCOUNTER — TELEPHONE (OUTPATIENT)
Dept: SURGERY | Facility: HOSPITAL | Age: 64
End: 2024-04-16
Payer: COMMERCIAL

## 2024-04-16 DIAGNOSIS — K60.4 FISTULA, PERIRECTAL: Primary | ICD-10-CM

## 2024-04-30 ENCOUNTER — DOCUMENTATION (OUTPATIENT)
Dept: CARE COORDINATION | Facility: CLINIC | Age: 64
End: 2024-04-30
Payer: COMMERCIAL

## 2024-05-08 DIAGNOSIS — K21.9 GASTROESOPHAGEAL REFLUX DISEASE WITHOUT ESOPHAGITIS: Primary | ICD-10-CM

## 2024-05-08 RX ORDER — PANTOPRAZOLE SODIUM 40 MG/1
40 TABLET, DELAYED RELEASE ORAL
Qty: 90 TABLET | Refills: 3 | Status: SHIPPED | OUTPATIENT
Start: 2024-05-08 | End: 2025-05-08

## 2024-05-09 DIAGNOSIS — E78.00 HYPERCHOLESTEROLEMIA: ICD-10-CM

## 2024-05-09 RX ORDER — SIMVASTATIN 20 MG/1
20 TABLET, FILM COATED ORAL NIGHTLY
Qty: 90 TABLET | Refills: 1 | Status: SHIPPED | OUTPATIENT
Start: 2024-05-09

## 2024-05-28 ENCOUNTER — APPOINTMENT (OUTPATIENT)
Dept: PRIMARY CARE | Facility: CLINIC | Age: 64
End: 2024-05-28
Payer: COMMERCIAL

## 2024-08-09 ENCOUNTER — HOSPITAL ENCOUNTER (OUTPATIENT)
Dept: RADIOLOGY | Facility: HOSPITAL | Age: 64
Discharge: HOME | End: 2024-08-09
Payer: COMMERCIAL

## 2024-08-09 VITALS — BODY MASS INDEX: 32.33 KG/M2 | WEIGHT: 206 LBS | HEIGHT: 67 IN

## 2024-08-09 DIAGNOSIS — Z12.31 ENCOUNTER FOR SCREENING MAMMOGRAM FOR MALIGNANT NEOPLASM OF BREAST: ICD-10-CM

## 2024-08-09 PROCEDURE — 77067 SCR MAMMO BI INCL CAD: CPT | Performed by: RADIOLOGY

## 2024-08-09 PROCEDURE — 77063 BREAST TOMOSYNTHESIS BI: CPT | Performed by: RADIOLOGY

## 2024-08-09 PROCEDURE — 77067 SCR MAMMO BI INCL CAD: CPT

## 2024-08-23 DIAGNOSIS — E78.00 HYPERCHOLESTEROLEMIA: ICD-10-CM

## 2024-08-26 ENCOUNTER — HOSPITAL ENCOUNTER (OUTPATIENT)
Dept: RADIOLOGY | Facility: HOSPITAL | Age: 64
Discharge: HOME | End: 2024-08-26
Payer: COMMERCIAL

## 2024-08-26 ENCOUNTER — APPOINTMENT (OUTPATIENT)
Dept: RADIOLOGY | Facility: HOSPITAL | Age: 64
End: 2024-08-26
Payer: COMMERCIAL

## 2024-08-26 DIAGNOSIS — R92.8 ABNORMAL MAMMOGRAM: ICD-10-CM

## 2024-08-26 PROCEDURE — 77065 DX MAMMO INCL CAD UNI: CPT | Mod: RIGHT SIDE | Performed by: RADIOLOGY

## 2024-08-26 PROCEDURE — 76642 ULTRASOUND BREAST LIMITED: CPT | Mod: RIGHT SIDE | Performed by: RADIOLOGY

## 2024-08-26 PROCEDURE — 77061 BREAST TOMOSYNTHESIS UNI: CPT | Mod: RT

## 2024-08-26 PROCEDURE — 77061 BREAST TOMOSYNTHESIS UNI: CPT | Mod: RIGHT SIDE | Performed by: RADIOLOGY

## 2024-08-26 PROCEDURE — 76642 ULTRASOUND BREAST LIMITED: CPT | Mod: RT

## 2024-08-26 RX ORDER — SIMVASTATIN 20 MG/1
20 TABLET, FILM COATED ORAL NIGHTLY
Qty: 90 TABLET | Refills: 1 | Status: SHIPPED | OUTPATIENT
Start: 2024-08-26

## 2024-08-30 DIAGNOSIS — R92.8 ABNORMAL MAMMOGRAM: Primary | ICD-10-CM

## 2024-09-03 ENCOUNTER — TELEPHONE (OUTPATIENT)
Dept: PRIMARY CARE | Facility: CLINIC | Age: 64
End: 2024-09-03
Payer: COMMERCIAL

## 2024-09-03 DIAGNOSIS — R92.8 ABNORMAL MAMMOGRAM: ICD-10-CM

## 2024-09-03 NOTE — TELEPHONE ENCOUNTER
Patient called, does not want to drive [place] wants to see Dr. Fernandez? Patient was going to call Dr. Fernandez's office     Please advise

## 2024-09-04 ENCOUNTER — OFFICE VISIT (OUTPATIENT)
Dept: SURGERY | Facility: CLINIC | Age: 64
End: 2024-09-04
Payer: COMMERCIAL

## 2024-09-04 VITALS
BODY MASS INDEX: 30.46 KG/M2 | HEIGHT: 68 IN | DIASTOLIC BLOOD PRESSURE: 83 MMHG | WEIGHT: 201 LBS | HEART RATE: 103 BPM | SYSTOLIC BLOOD PRESSURE: 128 MMHG | TEMPERATURE: 97.1 F

## 2024-09-04 DIAGNOSIS — R92.8 ABNORMAL MAMMOGRAM: ICD-10-CM

## 2024-09-04 DIAGNOSIS — R92.8 ABNORMAL MAMMOGRAM OF RIGHT BREAST: ICD-10-CM

## 2024-09-04 DIAGNOSIS — R92.1 CALCIFICATION OF RIGHT BREAST ON MAMMOGRAPHY: Primary | ICD-10-CM

## 2024-09-04 PROCEDURE — 99205 OFFICE O/P NEW HI 60 MIN: CPT | Performed by: SURGERY

## 2024-09-04 PROCEDURE — 3008F BODY MASS INDEX DOCD: CPT | Performed by: SURGERY

## 2024-09-04 PROCEDURE — 1036F TOBACCO NON-USER: CPT | Performed by: SURGERY

## 2024-09-04 NOTE — PROGRESS NOTES
Subjective   Patient ID: Reanna Pineda is a 63 y.o. female who presents for an abnormal mammogram of the right breast with calcifications    HPI   This patient presented with a mammographic abnormality of the right breast consisting of a hypoechoic focus with calcifications.  NO FH of breast or ovarian cancer, NO previous breast surgery , Menses at 16 , Menopause at 46 , Children  NON, Nipple discharge NO, Breast pain NO, Birth control pill NO, Radiation NO, History of collagen vascular disease NO .        Past Medical History:   Diagnosis Date    Adverse effect of anesthesia     COMBATIVE X 1 AFTER    Angina pectoris (CMS-HCC)     Arthritis     Awareness under anesthesia     during colonoscopy    Bronchitis     GERD (gastroesophageal reflux disease)     High cholesterol         Current Outpatient Medications on File Prior to Visit   Medication Sig Dispense Refill    cholecalciferol (Vitamin D-3) 25 MCG (1000 UT) tablet Take 1 tablet (25 mcg) by mouth once daily.      cyanocobalamin (Vitamin B-12) 1,000 mcg tablet Take 100 mcg by mouth once daily.      pantoprazole (ProtoNix) 40 mg EC tablet Take 1 tablet (40 mg) by mouth once daily in the morning. Take before meals. 90 tablet 3    simvastatin (Zocor) 20 mg tablet Take 1 tablet (20 mg) by mouth once daily at bedtime. 90 tablet 1    vitamin E 180 mg (400 unit) capsule Take 1 capsule (400 Units) by mouth once daily.       No current facility-administered medications on file prior to visit.        Review of Systems   All other systems reviewed and are negative.      Vitals:    09/04/24 1307   BP: 128/83   Pulse: 103   Temp: 36.2 °C (97.1 °F)        Objective     Physical Exam  Vitals reviewed. Exam conducted with a chaperone present.   Constitutional:       Appearance: Normal appearance.   HENT:      Head: Normocephalic.   Cardiovascular:      Rate and Rhythm: Normal rate and regular rhythm.      Heart sounds: Normal heart sounds.   Pulmonary:      Effort: Pulmonary  effort is normal.      Breath sounds: Normal breath sounds.   Chest:   Breasts:     Right: Normal.      Left: Normal.   Abdominal:      General: Abdomen is flat.      Palpations: Abdomen is soft. There is no mass.      Tenderness: There is no abdominal tenderness. There is no guarding.   Musculoskeletal:         General: Normal range of motion.      Cervical back: Normal range of motion.   Lymphadenopathy:      Upper Body:      Right upper body: No axillary adenopathy.      Left upper body: No axillary adenopathy.   Skin:     General: Skin is warm.   Neurological:      General: No focal deficit present.   Psychiatric:         Mood and Affect: Mood normal.       Review of imaging 8/9/2024  IMPRESSION:  Right inner lower quadrant regionally grouped pleomorphic  calcifications and architectural distortion should be biopsied.    Problem List Items Addressed This Visit       Abnormal mammogram of right breast    Relevant Orders    BI US guided breast localization and biopsy right    BI stereotactic guided breast right localization and biopsy    Calcification of right breast on mammography - Primary    Relevant Orders    BI stereotactic guided breast right localization and biopsy     Other Visit Diagnoses       Abnormal mammogram                 Assessment/Plan   Image guided biopsy right breast at the [facility]  Used to be until November when you have a Medicare number we will therefore schedule it as soon as this is available  Follow-up after biopsy  I will call you soon as results are available    Sherwin Fernandez MD

## 2024-09-04 NOTE — PATIENT INSTRUCTIONS
You have an abnormal mammogram of the right breast.  You will be scheduled for a breast biopsy at the [facility] [place] in the first week of November per your request based on your insurance coverage.  I will call you with that result.  I will see you after that.

## 2024-10-12 ENCOUNTER — APPOINTMENT (OUTPATIENT)
Dept: OBSTETRICS AND GYNECOLOGY | Facility: CLINIC | Age: 64
End: 2024-10-12
Payer: COMMERCIAL

## 2024-10-12 VITALS
DIASTOLIC BLOOD PRESSURE: 80 MMHG | HEIGHT: 67 IN | BODY MASS INDEX: 31.39 KG/M2 | TEMPERATURE: 98 F | HEART RATE: 90 BPM | SYSTOLIC BLOOD PRESSURE: 134 MMHG | WEIGHT: 200 LBS

## 2024-10-12 DIAGNOSIS — Z12.4 PAP SMEAR FOR CERVICAL CANCER SCREENING: ICD-10-CM

## 2024-10-12 DIAGNOSIS — Z01.419 WELL WOMAN EXAM: Primary | ICD-10-CM

## 2024-10-12 PROCEDURE — 3008F BODY MASS INDEX DOCD: CPT | Performed by: MIDWIFE

## 2024-10-12 PROCEDURE — 87624 HPV HI-RISK TYP POOLED RSLT: CPT

## 2024-10-12 PROCEDURE — 99386 PREV VISIT NEW AGE 40-64: CPT | Performed by: MIDWIFE

## 2024-10-12 PROCEDURE — 1036F TOBACCO NON-USER: CPT | Performed by: MIDWIFE

## 2024-10-12 PROCEDURE — 88175 CYTOPATH C/V AUTO FLUID REDO: CPT

## 2024-10-12 NOTE — PROGRESS NOTES
"Subjective   Patient ID: Reanna Pineda is a 63 y.o. female who presents for Annual Exam.  HPI  PMHx: G0; last pap 2017 NIL Neg; followed by breast specialist for breast calcifications-- bx planned 11/2024  SocHx: not SA in \"years\"   ROS: no pelvic pain, no urinary c/o, NAD  Review of Systems    Objective   Physical Exam  Vitals and nursing note reviewed.   Constitutional:       Appearance: Normal appearance.   HENT:      Nose: Nose normal.   Eyes:      Pupils: Pupils are equal, round, and reactive to light.   Neck:      Thyroid: No thyroid mass.   Cardiovascular:      Rate and Rhythm: Normal rate and regular rhythm.   Pulmonary:      Effort: Pulmonary effort is normal.      Breath sounds: Normal breath sounds.   Chest:      Chest wall: No mass.   Breasts:     Right: Normal.      Left: Normal.   Abdominal:      Palpations: Abdomen is soft. There is no mass.      Tenderness: There is no abdominal tenderness.   Genitourinary:     General: Normal vulva.      Labia:         Right: No rash, tenderness or lesion.         Left: No rash, tenderness or lesion.       Vagina: Normal.      Cervix: Normal.      Uterus: Normal.       Adnexa: Right adnexa normal and left adnexa normal.      Comments: Pap obtained  Vulvovaginal atrophic changes  Musculoskeletal:         General: Normal range of motion.   Lymphadenopathy:      Cervical: No cervical adenopathy.      Lower Body: No right inguinal adenopathy. No left inguinal adenopathy.   Skin:     General: Skin is warm and dry.   Neurological:      Mental Status: She is alert and oriented to person, place, and time.      Motor: Motor function is intact.      Gait: Gait is intact.   Psychiatric:         Mood and Affect: Mood normal.         Assessment/Plan   Diagnoses and all orders for this visit:  Well woman exam  -     THINPREP PAP TEST  Pap smear for cervical cancer screening  -     THINPREP PAP TEST    Reassurance given re exam  RTO AE/PRN       Cristina Medina, APRN-CNM, ND " 10/12/24 12:44 PM

## 2024-10-18 LAB
CYTOLOGY CMNT CVX/VAG CYTO-IMP: NORMAL
HPV HR 12 DNA GENITAL QL NAA+PROBE: NEGATIVE
HPV HR GENOTYPES PNL CVX NAA+PROBE: NEGATIVE
HPV16 DNA SPEC QL NAA+PROBE: NEGATIVE
HPV18 DNA SPEC QL NAA+PROBE: NEGATIVE
LAB AP HPV GENOTYPE QUESTION: YES
LAB AP HPV HR: NORMAL
LABORATORY COMMENT REPORT: NORMAL
PATH REPORT.TOTAL CANCER: NORMAL

## 2024-10-24 DIAGNOSIS — K21.9 GASTROESOPHAGEAL REFLUX DISEASE WITHOUT ESOPHAGITIS: ICD-10-CM

## 2024-10-25 RX ORDER — PANTOPRAZOLE SODIUM 40 MG/1
40 TABLET, DELAYED RELEASE ORAL
Qty: 90 TABLET | Refills: 0 | Status: SHIPPED | OUTPATIENT
Start: 2024-10-25 | End: 2025-10-25

## 2024-11-06 ENCOUNTER — HOSPITAL ENCOUNTER (OUTPATIENT)
Dept: RADIOLOGY | Facility: HOSPITAL | Age: 64
Discharge: HOME | End: 2024-11-06
Payer: MEDICARE

## 2024-11-06 DIAGNOSIS — N64.89 BREAST ASYMMETRY: ICD-10-CM

## 2024-11-06 DIAGNOSIS — R92.8 OTHER ABNORMAL AND INCONCLUSIVE FINDINGS ON DIAGNOSTIC IMAGING OF BREAST: ICD-10-CM

## 2024-11-06 PROCEDURE — 19081 BX BREAST 1ST LESION STRTCTC: CPT | Mod: RT

## 2024-11-06 PROCEDURE — 19081 BX BREAST 1ST LESION STRTCTC: CPT | Mod: RIGHT SIDE | Performed by: STUDENT IN AN ORGANIZED HEALTH CARE EDUCATION/TRAINING PROGRAM

## 2024-11-06 PROCEDURE — 2720000007 HC OR 272 NO HCPCS

## 2024-11-06 PROCEDURE — 76642 ULTRASOUND BREAST LIMITED: CPT | Mod: RT,RSC

## 2024-11-06 PROCEDURE — 77061 BREAST TOMOSYNTHESIS UNI: CPT | Mod: RT

## 2024-11-06 PROCEDURE — 76642 ULTRASOUND BREAST LIMITED: CPT | Mod: RIGHT SIDE | Performed by: STUDENT IN AN ORGANIZED HEALTH CARE EDUCATION/TRAINING PROGRAM

## 2024-11-06 PROCEDURE — 2500000004 HC RX 250 GENERAL PHARMACY W/ HCPCS (ALT 636 FOR OP/ED): Performed by: STUDENT IN AN ORGANIZED HEALTH CARE EDUCATION/TRAINING PROGRAM

## 2024-11-06 PROCEDURE — 77065 DX MAMMO INCL CAD UNI: CPT | Mod: RIGHT SIDE | Performed by: STUDENT IN AN ORGANIZED HEALTH CARE EDUCATION/TRAINING PROGRAM

## 2024-11-06 PROCEDURE — A4648 IMPLANTABLE TISSUE MARKER: HCPCS

## 2024-11-06 PROCEDURE — 77061 BREAST TOMOSYNTHESIS UNI: CPT | Mod: RIGHT SIDE | Performed by: STUDENT IN AN ORGANIZED HEALTH CARE EDUCATION/TRAINING PROGRAM

## 2024-11-06 RX ORDER — LIDOCAINE HYDROCHLORIDE 10 MG/ML
INJECTION, SOLUTION EPIDURAL; INFILTRATION; INTRACAUDAL; PERINEURAL AS NEEDED
Status: COMPLETED | OUTPATIENT
Start: 2024-11-06 | End: 2024-11-06

## 2024-11-06 ASSESSMENT — PAIN SCALES - GENERAL
PAINLEVEL_OUTOF10: 0 - NO PAIN

## 2024-11-11 ENCOUNTER — TELEPHONE (OUTPATIENT)
Dept: SURGERY | Facility: HOSPITAL | Age: 64
End: 2024-11-11
Payer: COMMERCIAL

## 2024-11-11 DIAGNOSIS — N60.91 ATYPICAL DUCTAL HYPERPLASIA OF RIGHT BREAST: Primary | ICD-10-CM

## 2024-11-11 DIAGNOSIS — R92.8 ABNORMAL MAMMOGRAM OF RIGHT BREAST: ICD-10-CM

## 2024-11-11 DIAGNOSIS — R92.8 ABNORMAL MAMMOGRAM: ICD-10-CM

## 2024-11-11 PROBLEM — N64.89 RADIAL SCAR OF RIGHT BREAST: Status: ACTIVE | Noted: 2024-11-11

## 2024-11-11 LAB
LAB AP ASR DISCLAIMER: NORMAL
LABORATORY COMMENT REPORT: NORMAL
PATH REPORT.FINAL DX SPEC: NORMAL
PATH REPORT.GROSS SPEC: NORMAL
PATH REPORT.RELEVANT HX SPEC: NORMAL
PATH REPORT.TOTAL CANCER: NORMAL

## 2024-11-11 NOTE — TELEPHONE ENCOUNTER
Spoke with the patient today regarding biopsy results.  Has atypical ductal hyperplasia and a radial scar of the right breast.  Recommend Magseed lumpectomy.  She will be scheduled for this in 12/19/2024.  She is due to see me on 11/27/2024.  Magseed has been ordered.

## 2024-11-18 ENCOUNTER — APPOINTMENT (OUTPATIENT)
Dept: PRIMARY CARE | Facility: CLINIC | Age: 64
End: 2024-11-18
Payer: COMMERCIAL

## 2024-11-18 ENCOUNTER — LAB (OUTPATIENT)
Dept: LAB | Facility: LAB | Age: 64
End: 2024-11-18
Payer: COMMERCIAL

## 2024-11-18 VITALS
SYSTOLIC BLOOD PRESSURE: 136 MMHG | OXYGEN SATURATION: 96 % | BODY MASS INDEX: 30.01 KG/M2 | HEART RATE: 92 BPM | WEIGHT: 202.6 LBS | HEIGHT: 69 IN | DIASTOLIC BLOOD PRESSURE: 94 MMHG | TEMPERATURE: 95 F

## 2024-11-18 DIAGNOSIS — M85.80 OSTEOPENIA AFTER MENOPAUSE: ICD-10-CM

## 2024-11-18 DIAGNOSIS — Z00.00 ROUTINE GENERAL MEDICAL EXAMINATION AT HEALTH CARE FACILITY: ICD-10-CM

## 2024-11-18 DIAGNOSIS — E78.00 HYPERCHOLESTEROLEMIA: ICD-10-CM

## 2024-11-18 DIAGNOSIS — K21.9 GASTROESOPHAGEAL REFLUX DISEASE, UNSPECIFIED WHETHER ESOPHAGITIS PRESENT: ICD-10-CM

## 2024-11-18 DIAGNOSIS — R07.89 ATYPICAL CHEST PAIN: ICD-10-CM

## 2024-11-18 DIAGNOSIS — N60.91 ATYPICAL DUCTAL HYPERPLASIA OF RIGHT BREAST: ICD-10-CM

## 2024-11-18 DIAGNOSIS — Z78.0 OSTEOPENIA AFTER MENOPAUSE: ICD-10-CM

## 2024-11-18 DIAGNOSIS — E55.9 VITAMIN D DEFICIENCY: ICD-10-CM

## 2024-11-18 DIAGNOSIS — R79.9 ABNORMAL FINDING OF BLOOD CHEMISTRY, UNSPECIFIED: ICD-10-CM

## 2024-11-18 DIAGNOSIS — K21.9 GASTROESOPHAGEAL REFLUX DISEASE WITHOUT ESOPHAGITIS: ICD-10-CM

## 2024-11-18 DIAGNOSIS — Z78.0 MENOPAUSE: ICD-10-CM

## 2024-11-18 DIAGNOSIS — Z00.00 ROUTINE GENERAL MEDICAL EXAMINATION AT HEALTH CARE FACILITY: Primary | ICD-10-CM
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
## 2024-11-18 DIAGNOSIS — Z00.00 MEDICARE WELCOME EXAM: ICD-10-CM

## 2024-11-18 DIAGNOSIS — B37.9 CANDIDIASIS: ICD-10-CM

## 2024-11-18 LAB
25(OH)D3 SERPL-MCNC: 34 NG/ML (ref 30–100)
ALBUMIN SERPL BCP-MCNC: 4.3 G/DL (ref 3.4–5)
ALP SERPL-CCNC: 116 U/L (ref 33–136)
ALT SERPL W P-5'-P-CCNC: 36 U/L (ref 7–45)
ANION GAP SERPL CALC-SCNC: 11 MMOL/L (ref 10–20)
AST SERPL W P-5'-P-CCNC: 29 U/L (ref 9–39)
BASOPHILS # BLD AUTO: 0.02 X10*3/UL (ref 0–0.1)
BASOPHILS NFR BLD AUTO: 0.3 %
BILIRUB SERPL-MCNC: 0.9 MG/DL (ref 0–1.2)
BUN SERPL-MCNC: 16 MG/DL (ref 6–23)
CALCIUM SERPL-MCNC: 9.3 MG/DL (ref 8.6–10.3)
CHLORIDE SERPL-SCNC: 101 MMOL/L (ref 98–107)
CHOLEST SERPL-MCNC: 217 MG/DL (ref 0–199)
CHOLESTEROL/HDL RATIO: 2.8
CO2 SERPL-SCNC: 33 MMOL/L (ref 21–32)
CREAT SERPL-MCNC: 0.66 MG/DL (ref 0.5–1.05)
EGFRCR SERPLBLD CKD-EPI 2021: >90 ML/MIN/1.73M*2
EOSINOPHIL # BLD AUTO: 0.09 X10*3/UL (ref 0–0.7)
EOSINOPHIL NFR BLD AUTO: 1.3 %
ERYTHROCYTE [DISTWIDTH] IN BLOOD BY AUTOMATED COUNT: 13.2 % (ref 11.5–14.5)
EST. AVERAGE GLUCOSE BLD GHB EST-MCNC: 126 MG/DL
GLUCOSE SERPL-MCNC: 99 MG/DL (ref 74–99)
HBA1C MFR BLD: 6 %
HCT VFR BLD AUTO: 43.9 % (ref 36–46)
HCV AB SER QL: NONREACTIVE
HDLC SERPL-MCNC: 76.2 MG/DL
HGB BLD-MCNC: 14.4 G/DL (ref 12–16)
IMM GRANULOCYTES # BLD AUTO: 0.02 X10*3/UL (ref 0–0.7)
IMM GRANULOCYTES NFR BLD AUTO: 0.3 % (ref 0–0.9)
LDLC SERPL CALC-MCNC: 121 MG/DL
LYMPHOCYTES # BLD AUTO: 2.3 X10*3/UL (ref 1.2–4.8)
LYMPHOCYTES NFR BLD AUTO: 34 %
MAGNESIUM SERPL-MCNC: 2.4 MG/DL (ref 1.6–2.4)
MCH RBC QN AUTO: 30.8 PG (ref 26–34)
MCHC RBC AUTO-ENTMCNC: 32.8 G/DL (ref 32–36)
MCV RBC AUTO: 94 FL (ref 80–100)
MONOCYTES # BLD AUTO: 0.46 X10*3/UL (ref 0.1–1)
MONOCYTES NFR BLD AUTO: 6.8 %
NEUTROPHILS # BLD AUTO: 3.87 X10*3/UL (ref 1.2–7.7)
NEUTROPHILS NFR BLD AUTO: 57.3 %
NON HDL CHOLESTEROL: 141 MG/DL (ref 0–149)
NRBC BLD-RTO: 0 /100 WBCS (ref 0–0)
PLATELET # BLD AUTO: 252 X10*3/UL (ref 150–450)
POTASSIUM SERPL-SCNC: 4 MMOL/L (ref 3.5–5.3)
PROT SERPL-MCNC: 7.2 G/DL (ref 6.4–8.2)
RBC # BLD AUTO: 4.68 X10*6/UL (ref 4–5.2)
SODIUM SERPL-SCNC: 141 MMOL/L (ref 136–145)
TRIGL SERPL-MCNC: 98 MG/DL (ref 0–149)
TSH SERPL-ACNC: 1.16 MIU/L (ref 0.44–3.98)
VLDL: 20 MG/DL (ref 0–40)
WBC # BLD AUTO: 6.8 X10*3/UL (ref 4.4–11.3)

## 2024-11-18 PROCEDURE — 36415 COLL VENOUS BLD VENIPUNCTURE: CPT

## 2024-11-18 PROCEDURE — 1170F FXNL STATUS ASSESSED: CPT | Performed by: INTERNAL MEDICINE

## 2024-11-18 PROCEDURE — 1160F RVW MEDS BY RX/DR IN RCRD: CPT | Performed by: INTERNAL MEDICINE

## 2024-11-18 PROCEDURE — 82306 VITAMIN D 25 HYDROXY: CPT

## 2024-11-18 PROCEDURE — 1159F MED LIST DOCD IN RCRD: CPT | Performed by: INTERNAL MEDICINE

## 2024-11-18 PROCEDURE — 86618 LYME DISEASE ANTIBODY: CPT

## 2024-11-18 PROCEDURE — 3008F BODY MASS INDEX DOCD: CPT | Performed by: INTERNAL MEDICINE

## 2024-11-18 PROCEDURE — G0402 INITIAL PREVENTIVE EXAM: HCPCS | Performed by: INTERNAL MEDICINE

## 2024-11-18 PROCEDURE — 1036F TOBACCO NON-USER: CPT | Performed by: INTERNAL MEDICINE

## 2024-11-18 PROCEDURE — 83036 HEMOGLOBIN GLYCOSYLATED A1C: CPT

## 2024-11-18 PROCEDURE — 86803 HEPATITIS C AB TEST: CPT

## 2024-11-18 PROCEDURE — G0403 EKG FOR INITIAL PREVENT EXAM: HCPCS | Performed by: INTERNAL MEDICINE

## 2024-11-18 PROCEDURE — 99214 OFFICE O/P EST MOD 30 MIN: CPT | Performed by: INTERNAL MEDICINE

## 2024-11-18 RX ORDER — DOXYCYCLINE 100 MG/1
100 CAPSULE ORAL 2 TIMES DAILY
Qty: 20 CAPSULE | Refills: 0 | Status: SHIPPED | OUTPATIENT
Start: 2024-11-18 | End: 2024-11-28

## 2024-11-18 RX ORDER — NYSTATIN 100000 [USP'U]/G
1 POWDER TOPICAL 2 TIMES DAILY
COMMUNITY
End: 2024-11-18 | Stop reason: SDUPTHER

## 2024-11-18 RX ORDER — NYSTATIN 100000 [USP'U]/G
1 POWDER TOPICAL 2 TIMES DAILY
Qty: 60 G | Refills: 2 | Status: SHIPPED | OUTPATIENT
Start: 2024-11-18

## 2024-11-18 ASSESSMENT — ENCOUNTER SYMPTOMS
BRUISES/BLEEDS EASILY: 0
DIZZINESS: 0
SORE THROAT: 0
PALPITATIONS: 0
DIARRHEA: 0
UNEXPECTED WEIGHT CHANGE: 1
ABDOMINAL PAIN: 0
SINUS PAIN: 0
HEADACHES: 0
WHEEZING: 0
BLOOD IN STOOL: 0
FEVER: 0
FATIGUE: 0
DIFFICULTY URINATING: 0
ARTHRALGIAS: 0
COUGH: 0

## 2024-11-18 ASSESSMENT — PATIENT HEALTH QUESTIONNAIRE - PHQ9
SUM OF ALL RESPONSES TO PHQ9 QUESTIONS 1 AND 2: 1
1. LITTLE INTEREST OR PLEASURE IN DOING THINGS: NOT AT ALL
10. IF YOU CHECKED OFF ANY PROBLEMS, HOW DIFFICULT HAVE THESE PROBLEMS MADE IT FOR YOU TO DO YOUR WORK, TAKE CARE OF THINGS AT HOME, OR GET ALONG WITH OTHER PEOPLE: NOT DIFFICULT AT ALL
2. FEELING DOWN, DEPRESSED OR HOPELESS: SEVERAL DAYS

## 2024-11-18 ASSESSMENT — ACTIVITIES OF DAILY LIVING (ADL)
MANAGING_FINANCES: INDEPENDENT
TAKING_MEDICATION: INDEPENDENT
BATHING: INDEPENDENT
GROCERY_SHOPPING: INDEPENDENT
DRESSING: INDEPENDENT
DOING_HOUSEWORK: INDEPENDENT

## 2024-11-18 NOTE — PROGRESS NOTES
"Subjective   Patient ID: Reanna Pineda is a 64 y.o. female who presents for Welcome To Medicare, Tick Removal (2 ticks in last month, on left upper chest/arm area, left hip area), Rash (ROUND AREA ON UPPER LT ARM AREA), and RED SKIN UNDER BREASTS (Uses nystatin powder).    Patient admitted to the hospital for chest pressure radiating to the left arm patient with abnormal EKG was supposed to have cardiac catheterization for further eval recommendation abnormal EKG  Patient transferred to [facility] underwent cardiac catheterization which came back normal results maximize medical management underlying chest pain due to reflux disease     - Status postcardiac catheterization no significant findings known coronary artery disease significant at this time continue with cholesterol medication maximize max medical management  -Reflux disease symptoms continues Protonix 40 mg daily patient declined endoscopy at this time will reevaluate patient in 4 weeks for further recommendation  - Patient using multi vitamins with good compliance patient sent to discontinue all vitamins to take only vitamin B12 and vitamin D in addition to Centrum Silver daily  -- Hypercholesterolemia patient counseled about maximizing low-fat diet low triglyceride diet  Counseled about weight loss  Continue simvastatin  -Needs to proceed with perianal fistula surgery as recommended  -Screening colonoscopy done in 2021 need to repeat in 2026  -Patient consumes large amount of beer on a weekly basis counseled about alcohol abstinence.  Counseled about reflux disease and hiatal hernia possibility patient aware will readdress symptoms again after cardiac event and surgical intervention for perianal.         Rash           Review of Systems   Skin:  Positive for rash.       Objective   Lab Results   Component Value Date    HGBA1C 5.9 (H) 01/18/2024      BP (!) 136/94   Pulse 92   Temp 35 °C (95 °F)   Ht 1.753 m (5' 9\")   Wt 91.9 kg (202 lb 9.6 oz) "   SpO2 96%   BMI 29.92 kg/m²   Lab Results   Component Value Date    WBC 6.8 03/25/2024    HGB 14.4 03/25/2024    HCT 45.5 03/25/2024     03/25/2024    CHOL 146 03/25/2024    TRIG 89 03/25/2024    HDL 57.3 03/25/2024    ALT 23 03/25/2024    AST 17 03/25/2024     03/25/2024    K 4.1 03/25/2024     03/25/2024    CREATININE 0.77 03/25/2024    BUN 13 03/25/2024    CO2 32 03/25/2024    TSH 3.91 12/27/2023    INR 1.0 03/15/2024    HGBA1C 5.9 (H) 01/18/2024     par   Physical Exam    Assessment/Plan   Reanna was seen today for welcome to medicare, tick removal, rash and red skin under breasts.  Diagnoses and all orders for this visit:  Medicare welcome exam  -     ECG 12 Lead  Candidiasis

## 2024-11-18 NOTE — ASSESSMENT & PLAN NOTE
Welcome to Medicare exam  - Vaccinations reviewedComment up to date and up-to-date  -Screening mammogram up-to-date  -Counseled about osteoporosis follow-up bone density  -Screening for depression negative  - Advance of directive reviewed  - Need to proceed with complete blood work  - Needs screening for colon cancer obtained 2021 and repeat upper endoscopy repeat in 2026  - EKG no changes from previous EKG patient reassured patient cardiac workup negative    -Follow-up  - Patient recently had tick bite with cellulitis start on doxycycline twice a day for 10 days  Obtain Lyme titers previous tick bites  -Recent diagnosis of right breast abnormality atypical ductal hyperplasia awaiting for lumpectomy follow-up results closely  -- Status postcardiac catheterization no significant findings known coronary artery disease significant at this time continue with cholesterol medication maximize max medical management  -Reflux disease symptoms continues Protonix 40 mg daily patient declined endoscopy at this time will reevaluate patient in 4 weeks for further recommendation  - Patient using multi vitamins with good compliance patient sent to discontinue all vitamins to take only vitamin B12 and vitamin D in addition to Centrum Silver daily  -- Hypercholesterolemia patient counseled about maximizing low-fat diet low triglyceride diet  Counseled about weight loss  Continue simvastatin  --Patient consumes large amount of beer on a weekly basis counseled about alcohol abstinence.  Follow-up closely as needed  Follow-up results follow-up 6 months

## 2024-11-18 NOTE — PROGRESS NOTES
Subjective   Reason for Visit: Reanna Pineda is an 64 y.o. female here for a Medicare Wellness visit.     Past Medical, Surgical, and Family History reviewed and updated in chart.    Reviewed all medications by prescribing practitioner or clinical pharmacist (such as prescriptions, OTCs, herbal therapies and supplements) and documented in the medical record.    Welcome to Medicare exam  - Vaccinations reviewedComment up to date and up-to-date  -Screening mammogram up-to-date  -Counseled about osteoporosis follow-up bone density  -Screening for depression negative  - Advance of directive reviewed  - Need to proceed with complete blood work  - Needs screening for colon cancer obtained 2021 and repeat upper endoscopy repeat in 2026  - EKG no changes from previous EKG patient reassured patient cardiac workup negative    -Follow-up  - Patient recently had tick bite with cellulitis start on doxycycline twice a day for 10 days  Obtain Lyme titers previous tick bites  -Recent diagnosis of right breast abnormality atypical ductal hyperplasia awaiting for lumpectomy follow-up results closely  -- Status postcardiac catheterization no significant findings known coronary artery disease significant at this time continue with cholesterol medication maximize max medical management  -Reflux disease symptoms continues Protonix 40 mg daily patient declined endoscopy at this time will reevaluate patient in 4 weeks for further recommendation  - Patient using multi vitamins with good compliance patient sent to discontinue all vitamins to take only vitamin B12 and vitamin D in addition to Centrum Silver daily  -- Hypercholesterolemia patient counseled about maximizing low-fat diet low triglyceride diet  Counseled about weight loss  Continue simvastatin  --Patient consumes large amount of beer on a weekly basis counseled about alcohol abstinence.  Follow-up closely as needed  Follow-up results follow-up 6 months        Patient Care  "Team:  Randy Davis MD as PCP - General  Randy Davis MD as PCP - [facility] PCP  Ruth Mcintosh MD as Surgeon (General Surgery)     Review of Systems   Constitutional:  Positive for unexpected weight change. Negative for fatigue and fever.   HENT:  Negative for congestion, ear discharge, ear pain, mouth sores, sinus pain and sore throat.    Eyes:  Negative for visual disturbance.   Respiratory:  Negative for cough and wheezing.    Cardiovascular:  Negative for chest pain, palpitations and leg swelling.   Gastrointestinal:  Negative for abdominal pain, blood in stool and diarrhea.   Genitourinary:  Negative for difficulty urinating.   Musculoskeletal:  Negative for arthralgias.   Skin:  Negative for rash.   Neurological:  Negative for dizziness and headaches.   Hematological:  Does not bruise/bleed easily.   Psychiatric/Behavioral:  Negative for behavioral problems.    All other systems reviewed and are negative.      Objective   Vitals:  BP (!) 136/94   Pulse 92   Temp 35 °C (95 °F)   Ht 1.753 m (5' 9\")   Wt 91.9 kg (202 lb 9.6 oz)   SpO2 96%   BMI 29.92 kg/m²       Physical Exam  Vitals and nursing note reviewed.   Constitutional:       Appearance: Normal appearance.   HENT:      Head: Normocephalic.      Nose: Nose normal.   Eyes:      Conjunctiva/sclera: Conjunctivae normal.      Pupils: Pupils are equal, round, and reactive to light.   Cardiovascular:      Rate and Rhythm: Regular rhythm.   Pulmonary:      Effort: Pulmonary effort is normal.      Breath sounds: Normal breath sounds.   Abdominal:      General: Abdomen is flat.      Palpations: Abdomen is soft.   Musculoskeletal:      Cervical back: Neck supple.   Skin:     General: Skin is warm.   Neurological:      General: No focal deficit present.      Mental Status: She is oriented to person, place, and time.   Psychiatric:         Mood and Affect: Mood normal.         Assessment & Plan  Medicare welcome exam    Orders:    ECG 12 " Lead    Candidiasis    Orders:    nystatin (Mycostatin) 100,000 unit/gram powder; Apply 1 Application topically 2 times a day.    Routine general medical examination at health care facility    Orders:    1 Year Follow Up In Primary Care - Wellness Exam; Future    Hepatitis C antibody; Future    XR DEXA bone density; Future    Hemoglobin A1c; Future    Albumin-Creatinine Ratio, Urine Random; Future    CBC and Auto Differential; Future    Comprehensive Metabolic Panel; Future    Lipid Panel; Future    Magnesium; Future    TSH with reflex to Free T4 if abnormal; Future    Vitamin D 25-Hydroxy,Total (for eval of Vitamin D levels); Future    Tick bite, unspecified site, initial encounter    Orders:    CBC and Auto Differential; Future    doxycycline (Vibramycin) 100 mg capsule; Take 1 capsule (100 mg) by mouth 2 times a day for 10 days. Take with at least 8 ounces (large glass) of water, do not lie down for 30 minutes after    LYME (B. BURGDORFERI) AB MODIFIED 2-TITER TESTING, WITH REFLEX TO IGM AND IGG BY MESSI; Future    Osteopenia after menopause    Orders:    Vitamin D 25-Hydroxy,Total (for eval of Vitamin D levels); Future    Hypercholesterolemia    Orders:    Hemoglobin A1c; Future    Gastroesophageal reflux disease, unspecified whether esophagitis present    Orders:    Hemoglobin A1c; Future    Gastroesophageal reflux disease without esophagitis    Orders:    Hemoglobin A1c; Future    Atypical chest pain    Orders:    Hemoglobin A1c; Future    Comprehensive Metabolic Panel; Future    Vitamin D deficiency    Orders:    Vitamin D 25-Hydroxy,Total (for eval of Vitamin D levels); Future    Menopause    Orders:    XR DEXA bone density; Future    Hemoglobin A1c; Future    Abnormal finding of blood chemistry, unspecified    Orders:    Hemoglobin A1c; Future    Atypical ductal hyperplasia of right breast       Welcome to Medicare exam  - Vaccinations reviewedComment up to date and up-to-date  -Screening mammogram  up-to-date  -Counseled about osteoporosis follow-up bone density  -Screening for depression negative  - Advance of directive reviewed  - Need to proceed with complete blood work  - Needs screening for colon cancer obtained 2021 and repeat upper endoscopy repeat in 2026  - EKG no changes from previous EKG patient reassured patient cardiac workup negative    -Follow-up  - Patient recently had tick bite with cellulitis start on doxycycline twice a day for 10 days  Obtain Lyme titers previous tick bites  -Recent diagnosis of right breast abnormality atypical ductal hyperplasia awaiting for lumpectomy follow-up results closely  -- Status postcardiac catheterization no significant findings known coronary artery disease significant at this time continue with cholesterol medication maximize max medical management  -Reflux disease symptoms continues Protonix 40 mg daily patient declined endoscopy at this time will reevaluate patient in 4 weeks for further recommendation  - Patient using multi vitamins with good compliance patient sent to discontinue all vitamins to take only vitamin B12 and vitamin D in addition to Centrum Silver daily  -- Hypercholesterolemia patient counseled about maximizing low-fat diet low triglyceride diet  Counseled about weight loss  Continue simvastatin  --Patient consumes large amount of beer on a weekly basis counseled about alcohol abstinence.  Follow-up closely as needed  Follow-up results follow-up 6 months

## 2024-11-20 LAB — B BURGDOR.VLSE1+PEPC10 AB SER IA-ACNC: 0.29 IV

## 2024-11-27 ENCOUNTER — PREP FOR PROCEDURE (OUTPATIENT)
Dept: SURGERY | Facility: HOSPITAL | Age: 64
End: 2024-11-27

## 2024-11-27 ENCOUNTER — HOSPITAL ENCOUNTER (OUTPATIENT)
Dept: RADIOLOGY | Facility: HOSPITAL | Age: 64
Discharge: HOME | End: 2024-11-27
Payer: MEDICARE

## 2024-11-27 ENCOUNTER — APPOINTMENT (OUTPATIENT)
Dept: SURGERY | Facility: CLINIC | Age: 64
End: 2024-11-27
Payer: COMMERCIAL

## 2024-11-27 ENCOUNTER — LAB (OUTPATIENT)
Dept: LAB | Facility: LAB | Age: 64
End: 2024-11-27
Payer: MEDICARE

## 2024-11-27 VITALS
TEMPERATURE: 97 F | HEIGHT: 67 IN | DIASTOLIC BLOOD PRESSURE: 83 MMHG | BODY MASS INDEX: 31.55 KG/M2 | HEART RATE: 100 BPM | SYSTOLIC BLOOD PRESSURE: 122 MMHG | WEIGHT: 201 LBS

## 2024-11-27 DIAGNOSIS — N60.91 ATYPICAL DUCTAL HYPERPLASIA OF RIGHT BREAST: Primary | ICD-10-CM

## 2024-11-27 DIAGNOSIS — Z78.0 MENOPAUSE: ICD-10-CM

## 2024-11-27 DIAGNOSIS — Z00.00 ROUTINE GENERAL MEDICAL EXAMINATION AT HEALTH CARE FACILITY: ICD-10-CM

## 2024-11-27 LAB
CREAT UR-MCNC: 171.7 MG/DL (ref 20–320)
MICROALBUMIN UR-MCNC: <7 MG/L
MICROALBUMIN/CREAT UR: NORMAL MG/G{CREAT}

## 2024-11-27 PROCEDURE — 1160F RVW MEDS BY RX/DR IN RCRD: CPT | Performed by: SURGERY

## 2024-11-27 PROCEDURE — 99214 OFFICE O/P EST MOD 30 MIN: CPT | Performed by: SURGERY

## 2024-11-27 PROCEDURE — 82570 ASSAY OF URINE CREATININE: CPT

## 2024-11-27 PROCEDURE — 3008F BODY MASS INDEX DOCD: CPT | Performed by: SURGERY

## 2024-11-27 PROCEDURE — 1159F MED LIST DOCD IN RCRD: CPT | Performed by: SURGERY

## 2024-11-27 PROCEDURE — 77080 DXA BONE DENSITY AXIAL: CPT

## 2024-11-27 PROCEDURE — 82043 UR ALBUMIN QUANTITATIVE: CPT

## 2024-11-27 PROCEDURE — 77080 DXA BONE DENSITY AXIAL: CPT | Performed by: RADIOLOGY

## 2024-11-27 PROCEDURE — 1036F TOBACCO NON-USER: CPT | Performed by: SURGERY

## 2024-11-27 RX ORDER — CELECOXIB 50 MG/1
200 CAPSULE ORAL ONCE
OUTPATIENT
Start: 2024-11-27 | End: 2024-11-27

## 2024-11-27 NOTE — PATIENT INSTRUCTIONS
You have an atypical ductal hyperplasia of the right breast.  As discussed a recommendations made for a right Magseed lumpectomy.  You will be undergoing Magseed placement on 12/4/2024.  Your surgery has been scheduled for 12/17/2024.  My office will provide you with preprocedure instructions.

## 2024-11-27 NOTE — PROGRESS NOTES
Subjective   Patient ID: Reanna Pineda is a 64 y.o. female who presents for follow-up of radial scar and atypical ductal hyperplasia right breast after image guided biopsy    HPI   Patient following up to discuss right Magseed lumpectomy after undergoing image guided biopsy right breast mammographic abnormality.  Noted to have atypical ductal hyperplasia.  Recommendations for a Magseed lumpectomy.  Past Medical History:   Diagnosis Date    Adverse effect of anesthesia     COMBATIVE X 1 AFTER    Angina pectoris     Arthritis     Awareness under anesthesia     during colonoscopy    Bronchitis     GERD (gastroesophageal reflux disease)     High cholesterol         Current Outpatient Medications on File Prior to Visit   Medication Sig Dispense Refill    cholecalciferol (Vitamin D-3) 25 MCG (1000 UT) tablet Take 1 tablet (25 mcg) by mouth once daily.      cyanocobalamin (Vitamin B-12) 1,000 mcg tablet Take 100 mcg by mouth once daily.      multivitamin with minerals iron-free (Centrum Silver) Take 1 tablet by mouth once daily.      pantoprazole (ProtoNix) 40 mg EC tablet Take 1 tablet (40 mg) by mouth once daily in the morning. Take before meals. 90 tablet 0    simvastatin (Zocor) 20 mg tablet Take 1 tablet (20 mg) by mouth once daily at bedtime. 90 tablet 1    vitamin E 180 mg (400 unit) capsule Take 1 capsule (400 Units) by mouth once daily.      doxycycline (Vibramycin) 100 mg capsule Take 1 capsule (100 mg) by mouth 2 times a day for 10 days. Take with at least 8 ounces (large glass) of water, do not lie down for 30 minutes after (Patient not taking: Reported on 11/27/2024) 20 capsule 0    nystatin (Mycostatin) 100,000 unit/gram powder Apply 1 Application topically 2 times a day. (Patient not taking: Reported on 11/27/2024) 60 g 2     No current facility-administered medications on file prior to visit.        Review of Systems   All other systems reviewed and are negative.      Vitals:    11/27/24 1302   BP: 122/83    Pulse: 100   Temp: 36.1 °C (97 °F)        Objective     Physical Exam  Constitutional:       Appearance: Normal appearance.   Cardiovascular:      Heart sounds: Normal heart sounds.   Pulmonary:      Breath sounds: Normal breath sounds and air entry.   Chest:   Breasts:     Right: Normal.      Left: Normal.      Comments: Some ecchymosis right breast  Abdominal:      General: Abdomen is flat.      Palpations: Abdomen is soft.      Tenderness: There is no abdominal tenderness.   Lymphadenopathy:      Upper Body:      Right upper body: No axillary adenopathy.      Left upper body: No axillary adenopathy.   Neurological:      Mental Status: She is alert.     Review of pathology  Surgical Pathology Exam: [id number]  Order: [id number]   Collected 11/6/2024 11:53       Status: Final result       Visible to patient: Yes (not seen)       Dx: Breast asymmetry; Other abnormal and ...    0 Result Notes       Component  Resulting Agency   FINAL DIAGNOSIS   A. BREAST ASYMMETRY, RIGHT, LOWER INNER QUADRANT, CORE BIOPSY:      -- Atypical ductal hyperplasia, see note.  -- Radial scar.  -- Sclerosing adenosis and microcyst with microcalcifications.     Note: Immunohistochemical stains for CK5/6 and estrogen receptor were performed, supporting the diagnosis.        Problem List Items Addressed This Visit       Atypical ductal hyperplasia of right breast - Primary        Assessment/Plan   Plan-findings discussed with the patient procedure discussed as below  RIGHT MAGSEED PARTIAL LUMPECTOMY 12.17.24 .  Risks include, but not limited to pain, infection, bleeding,  risk of positive margins, need for re-excision, risk of cardiac, pulmonary, neurologic, locomotor, anesthetic events, and other unforeseen complications including death.    Your  Magseed will be placed on December 4, 2024    Sherwin Fernandez MD

## 2024-11-27 NOTE — H&P (VIEW-ONLY)
Subjective   Patient ID: Reanna Pineda is a 64 y.o. female who presents for follow-up of radial scar and atypical ductal hyperplasia right breast after image guided biopsy    HPI   Patient following up to discuss right Magseed lumpectomy after undergoing image guided biopsy right breast mammographic abnormality.  Noted to have atypical ductal hyperplasia.  Recommendations for a Magseed lumpectomy.  Past Medical History:   Diagnosis Date    Adverse effect of anesthesia     COMBATIVE X 1 AFTER    Angina pectoris     Arthritis     Awareness under anesthesia     during colonoscopy    Bronchitis     GERD (gastroesophageal reflux disease)     High cholesterol         Current Outpatient Medications on File Prior to Visit   Medication Sig Dispense Refill    cholecalciferol (Vitamin D-3) 25 MCG (1000 UT) tablet Take 1 tablet (25 mcg) by mouth once daily.      cyanocobalamin (Vitamin B-12) 1,000 mcg tablet Take 100 mcg by mouth once daily.      multivitamin with minerals iron-free (Centrum Silver) Take 1 tablet by mouth once daily.      pantoprazole (ProtoNix) 40 mg EC tablet Take 1 tablet (40 mg) by mouth once daily in the morning. Take before meals. 90 tablet 0    simvastatin (Zocor) 20 mg tablet Take 1 tablet (20 mg) by mouth once daily at bedtime. 90 tablet 1    vitamin E 180 mg (400 unit) capsule Take 1 capsule (400 Units) by mouth once daily.      doxycycline (Vibramycin) 100 mg capsule Take 1 capsule (100 mg) by mouth 2 times a day for 10 days. Take with at least 8 ounces (large glass) of water, do not lie down for 30 minutes after (Patient not taking: Reported on 11/27/2024) 20 capsule 0    nystatin (Mycostatin) 100,000 unit/gram powder Apply 1 Application topically 2 times a day. (Patient not taking: Reported on 11/27/2024) 60 g 2     No current facility-administered medications on file prior to visit.        Review of Systems   All other systems reviewed and are negative.      Vitals:    11/27/24 1302   BP: 122/83    Pulse: 100   Temp: 36.1 °C (97 °F)        Objective     Physical Exam  Constitutional:       Appearance: Normal appearance.   Cardiovascular:      Heart sounds: Normal heart sounds.   Pulmonary:      Breath sounds: Normal breath sounds and air entry.   Chest:   Breasts:     Right: Normal.      Left: Normal.      Comments: Some ecchymosis right breast  Abdominal:      General: Abdomen is flat.      Palpations: Abdomen is soft.      Tenderness: There is no abdominal tenderness.   Lymphadenopathy:      Upper Body:      Right upper body: No axillary adenopathy.      Left upper body: No axillary adenopathy.   Neurological:      Mental Status: She is alert.     Review of pathology  Surgical Pathology Exam: [id number]  Order: [id number]   Collected 11/6/2024 11:53       Status: Final result       Visible to patient: Yes (not seen)       Dx: Breast asymmetry; Other abnormal and ...    0 Result Notes       Component  Resulting Agency   FINAL DIAGNOSIS   A. BREAST ASYMMETRY, RIGHT, LOWER INNER QUADRANT, CORE BIOPSY:      -- Atypical ductal hyperplasia, see note.  -- Radial scar.  -- Sclerosing adenosis and microcyst with microcalcifications.     Note: Immunohistochemical stains for CK5/6 and estrogen receptor were performed, supporting the diagnosis.        Problem List Items Addressed This Visit       Atypical ductal hyperplasia of right breast - Primary        Assessment/Plan   Plan-findings discussed with the patient procedure discussed as below  RIGHT MAGSEED PARTIAL LUMPECTOMY 12.17.24 .  Risks include, but not limited to pain, infection, bleeding,  risk of positive margins, need for re-excision, risk of cardiac, pulmonary, neurologic, locomotor, anesthetic events, and other unforeseen complications including death.    Your  Magseed will be placed on December 4, 2024    Sherwin Fernandez MD

## 2024-11-29 DIAGNOSIS — E78.00 HYPERCHOLESTEROLEMIA: ICD-10-CM

## 2024-12-01 RX ORDER — SIMVASTATIN 20 MG/1
20 TABLET, FILM COATED ORAL NIGHTLY
Qty: 90 TABLET | Refills: 1 | Status: SHIPPED | OUTPATIENT
Start: 2024-12-01

## 2024-12-03 ENCOUNTER — ANESTHESIA EVENT (OUTPATIENT)
Dept: OPERATING ROOM | Facility: HOSPITAL | Age: 64
End: 2024-12-03
Payer: MEDICARE

## 2024-12-03 NOTE — ANESTHESIA PREPROCEDURE EVALUATION
Patient: Reanna Pineda    Procedure Information       Date/Time: 12/17/24 0915    Procedure: Right breast magseed lumpectomy (Right)    Location: [facility] / [facility]    Surgeons: Sherwin Fernandez MD            Relevant Problems   Anesthesia  Combative with wake up one time   (+) Awareness under anesthesia      Cardiac   (+) Hypercholesterolemia      Pulmonary (within normal limits)      Neuro (within normal limits)      GI (within normal limits)      /Renal (within normal limits)      Liver (within normal limits)      Endocrine (within normal limits)      Hematology (within normal limits)      Musculoskeletal (within normal limits)      HEENT (within normal limits)      ID (within normal limits)      Skin (within normal limits)      GYN (within normal limits)      Digestive   (+) Anal fistula     There were no vitals filed for this visit.    Past Surgical History:   Procedure Laterality Date    ABSCESS DRAINAGE  2021    CARDIAC CATHETERIZATION N/A 3/25/2024    Procedure: Left Heart Cath;  Surgeon: Amador Ortiz MD;  Location: [facility] Cardiac Cath Lab;  Service: Cardiovascular;  Laterality: N/A;    CHOLECYSTECTOMY      COLONOSCOPY  2021    ENDOMETRIAL ABLATION      INCISION AND DRAINAGE PERIRECTAL ABSCESS  2021    X 2     Past Medical History:   Diagnosis Date    Adverse effect of anesthesia     COMBATIVE X 1 AFTER    Angina pectoris     Arthritis     Awareness under anesthesia     during colonoscopy    Bronchitis     GERD (gastroesophageal reflux disease)     High cholesterol      No current facility-administered medications for this encounter.    Current Outpatient Medications:     chlorhexidine (Hibiclens) 4 % external liquid, Apply topically once daily for 5 days., Disp: 354 mL, Rfl: 0    chlorhexidine (Peridex) 0.12 % solution, Use 15 mL in the mouth or throat once daily for 2 days. Once the night before surgery and once the morning of, Disp: 30 mL, Rfl: 0    cholecalciferol (Vitamin D-3) 25 MCG (1000  UT) tablet, Take 1 tablet (25 mcg) by mouth once daily., Disp: , Rfl:     cyanocobalamin (Vitamin B-12) 1,000 mcg tablet, Take 100 mcg by mouth once daily., Disp: , Rfl:     multivitamin with minerals iron-free (Centrum Silver), Take 1 tablet by mouth once daily., Disp: , Rfl:     nystatin (Mycostatin) 100,000 unit/gram powder, Apply 1 Application topically 2 times a day. (Patient not taking: Reported on 11/27/2024), Disp: 60 g, Rfl: 2    pantoprazole (ProtoNix) 40 mg EC tablet, Take 1 tablet (40 mg) by mouth once daily in the morning. Take before meals., Disp: 90 tablet, Rfl: 0    simvastatin (Zocor) 20 mg tablet, Take 1 tablet (20 mg) by mouth once daily at bedtime., Disp: 90 tablet, Rfl: 1    vitamin E 180 mg (400 unit) capsule, Take 1 capsule (400 Units) by mouth once daily., Disp: , Rfl:   Prior to Admission medications    Medication Sig Start Date End Date Taking? Authorizing Provider   chlorhexidine (Hibiclens) 4 % external liquid Apply topically once daily for 5 days. 12/3/24 12/8/24  Zulema Riley PA-C   chlorhexidine (Peridex) 0.12 % solution Use 15 mL in the mouth or throat once daily for 2 days. Once the night before surgery and once the morning of 12/3/24 12/5/24  Zulema Riley PA-C   cholecalciferol (Vitamin D-3) 25 MCG (1000 UT) tablet Take 1 tablet (25 mcg) by mouth once daily.    Historical Provider, MD   cyanocobalamin (Vitamin B-12) 1,000 mcg tablet Take 100 mcg by mouth once daily.    Historical Provider, MD   doxycycline (Vibramycin) 100 mg capsule Take 1 capsule (100 mg) by mouth 2 times a day for 10 days. Take with at least 8 ounces (large glass) of water, do not lie down for 30 minutes after  Patient not taking: Reported on 11/27/2024 11/18/24 11/28/24  Randy Davis MD   multivitamin with minerals iron-free (Centrum Silver) Take 1 tablet by mouth once daily.    Historical Provider, MD   nystatin (Mycostatin) 100,000 unit/gram powder Apply 1 Application topically 2 times a  day.  Patient not taking: Reported on 11/27/2024 11/18/24   Randy Davis MD   pantoprazole (ProtoNix) 40 mg EC tablet Take 1 tablet (40 mg) by mouth once daily in the morning. Take before meals. 10/25/24 10/25/25  Randy Davis MD   simvastatin (Zocor) 20 mg tablet Take 1 tablet (20 mg) by mouth once daily at bedtime. 12/1/24   Randy Davis MD   vitamin E 180 mg (400 unit) capsule Take 1 capsule (400 Units) by mouth once daily.    Historical Provider, MD   simvastatin (Zocor) 20 mg tablet Take 1 tablet (20 mg) by mouth once daily at bedtime. 8/26/24 12/1/24  Randy Davis MD     Allergies   Allergen Reactions    Percocet [Oxycodone-Acetaminophen] GI Upset and Nausea/vomiting    Other Other     POSSIBLE PCN ALLERGY as child    Penicillins Unknown     Social History     Tobacco Use    Smoking status: Never    Smokeless tobacco: Never   Substance Use Topics    Alcohol use: Yes     Alcohol/week: 15.0 standard drinks of alcohol     Types: 15 Standard drinks or equivalent per week         Chemistry    Lab Results   Component Value Date/Time     11/18/2024 1316    K 4.0 11/18/2024 1316     11/18/2024 1316    CO2 33 (H) 11/18/2024 1316    BUN 16 11/18/2024 1316    CREATININE 0.66 11/18/2024 1316    Lab Results   Component Value Date/Time    CALCIUM 9.3 11/18/2024 1316    ALKPHOS 116 11/18/2024 1316    AST 29 11/18/2024 1316    ALT 36 11/18/2024 1316    BILITOT 0.9 11/18/2024 1316          Lab Results   Component Value Date/Time    WBC 6.8 11/18/2024 1316    HGB 14.4 11/18/2024 1316    HCT 43.9 11/18/2024 1316     11/18/2024 1316     Lab Results   Component Value Date/Time    PROTIME 11.1 03/15/2024 1109    INR 1.0 03/15/2024 1109     Encounter Date: 11/18/24   ECG 12 Lead    Narrative    Sinus rhythm no ST-T wave changes no change from previous EKG     No results found for this or any previous visit from the past 1095 days.    Clinical information reviewed:                 Chart  reviewed.  Pt was hospitalized with atypical chest pain 3/24.  Cardiac cath was negative.    3/25/24 Cardiac Catheterization-  CONCLUSIONS:   1. Right dominant system, normal coronary arteries.    3/6/2024 Stress Test  Equivocal study for mild inferolateral and septal ischemia, normal LV systolic function. Normal functional capacity, no angina or arrhythmias or ischemic ECG changes.     3/5/2024 Echocardiogram  LVEF 60%. Normal valvular structure and function. Abnormal left ventricular relaxation. Unable to estimate pulmonary arterial systolic pressure. Normal estimated CVP.    NPO Detail:  No data recorded     Physical Exam    Airway  Mallampati: II     Cardiovascular - normal exam     Dental    Pulmonary - normal exam     Abdominal - normal exam             Anesthesia Plan    History of general anesthesia?: yes  History of complications of general anesthesia?: no    ASA 3     general     The patient is not a current smoker.  Patient did not smoke on day of procedure.    intravenous induction   Anesthetic plan and risks discussed with patient.

## 2024-12-04 ENCOUNTER — HOSPITAL ENCOUNTER (OUTPATIENT)
Dept: RADIOLOGY | Facility: HOSPITAL | Age: 64
Discharge: HOME | End: 2024-12-04
Payer: MEDICARE

## 2024-12-04 DIAGNOSIS — N60.91 ATYPICAL DUCTAL HYPERPLASIA OF RIGHT BREAST: ICD-10-CM

## 2024-12-04 PROCEDURE — 2500000004 HC RX 250 GENERAL PHARMACY W/ HCPCS (ALT 636 FOR OP/ED): Performed by: STUDENT IN AN ORGANIZED HEALTH CARE EDUCATION/TRAINING PROGRAM

## 2024-12-04 PROCEDURE — 19281 PERQ DEVICE BREAST 1ST IMAG: CPT | Mod: RT

## 2024-12-04 PROCEDURE — A4648 IMPLANTABLE TISSUE MARKER: HCPCS

## 2024-12-04 PROCEDURE — 2780000003 HC OR 278 NO HCPCS

## 2024-12-04 RX ORDER — LIDOCAINE HYDROCHLORIDE 10 MG/ML
5 INJECTION, SOLUTION EPIDURAL; INFILTRATION; INTRACAUDAL; PERINEURAL ONCE
Status: COMPLETED | OUTPATIENT
Start: 2024-12-04 | End: 2024-12-04

## 2024-12-04 RX ORDER — LIDOCAINE HYDROCHLORIDE 10 MG/ML
5 INJECTION, SOLUTION EPIDURAL; INFILTRATION; INTRACAUDAL; PERINEURAL ONCE
Status: DISCONTINUED | OUTPATIENT
Start: 2024-12-04 | End: 2024-12-04

## 2024-12-04 ASSESSMENT — PAIN SCALES - GENERAL
PAINLEVEL_OUTOF10: 0 - NO PAIN

## 2024-12-09 ENCOUNTER — TELEMEDICINE (OUTPATIENT)
Dept: PRIMARY CARE | Facility: CLINIC | Age: 64
End: 2024-12-09
Payer: MEDICARE

## 2024-12-09 ENCOUNTER — PRE-ADMISSION TESTING (OUTPATIENT)
Dept: PREADMISSION TESTING | Facility: HOSPITAL | Age: 64
End: 2024-12-09
Payer: MEDICARE

## 2024-12-09 VITALS — BODY MASS INDEX: 31.32 KG/M2 | WEIGHT: 200 LBS

## 2024-12-09 DIAGNOSIS — J20.9 ACUTE BRONCHITIS DUE TO INFECTION: Primary | ICD-10-CM

## 2024-12-09 DIAGNOSIS — Z01.818 PREOP TESTING: ICD-10-CM

## 2024-12-09 DIAGNOSIS — Z91.199 NO-SHOW FOR APPOINTMENT: Primary | ICD-10-CM

## 2024-12-09 PROCEDURE — 87081 CULTURE SCREEN ONLY: CPT | Mod: GENLAB

## 2024-12-09 PROCEDURE — 1159F MED LIST DOCD IN RCRD: CPT | Performed by: INTERNAL MEDICINE

## 2024-12-09 PROCEDURE — 1160F RVW MEDS BY RX/DR IN RCRD: CPT | Performed by: INTERNAL MEDICINE

## 2024-12-09 ASSESSMENT — DUKE ACTIVITY SCORE INDEX (DASI)
CAN YOU PARTICIPATE IN STRENOUS SPORTS LIKE SWIMMING, SINGLES TENNIS, FOOTBALL, BASKETBALL, OR SKIING: NO
CAN YOU DO LIGHT WORK AROUND THE HOUSE LIKE DUSTING OR WASHING DISHES: YES
CAN YOU DO MODERATE WORK AROUND THE HOUSE LIKE VACUUMING, SWEEPING FLOORS OR CARRYING GROCERIES: YES
CAN YOU WALK A BLOCK OR TWO ON LEVEL GROUND: YES
CAN YOU WALK INDOORS, SUCH AS AROUND YOUR HOUSE: YES
TOTAL_SCORE: 50.7
CAN YOU DO YARD WORK LIKE RAKING LEAVES, WEEDING OR PUSHING A MOWER: YES
CAN YOU PARTICIPATE IN MODERATE RECREATIONAL ACTIVITIES LIKE GOLF, BOWLING, DANCING, DOUBLES TENNIS OR THROWING A BASEBALL OR FOOTBALL: YES
CAN YOU HAVE SEXUAL RELATIONS: YES
DASI METS SCORE: 9
CAN YOU CLIMB A FLIGHT OF STAIRS OR WALK UP A HILL: YES
CAN YOU RUN A SHORT DISTANCE: YES
CAN YOU TAKE CARE OF YOURSELF (EAT, DRESS, BATHE, OR USE TOILET): YES
CAN YOU DO HEAVY WORK AROUND THE HOUSE LIKE SCRUBBING FLOORS OR LIFTING AND MOVING HEAVY FURNITURE: YES

## 2024-12-09 NOTE — PREPROCEDURE INSTRUCTIONS
Medication List            Accurate as of December 9, 2024 10:17 AM. Always use your most recent med list.                cholecalciferol 25 MCG (1000 UT) tablet  Commonly known as: Vitamin D-3  Additional Medication Adjustments for Surgery: Take last dose 7 days before surgery     cyanocobalamin 1,000 mcg tablet  Commonly known as: Vitamin B-12  Additional Medication Adjustments for Surgery: Take last dose 7 days before surgery     multivitamin with minerals iron-free  Commonly known as: Centrum Silver  Additional Medication Adjustments for Surgery: Take last dose 7 days before surgery     nystatin 100,000 unit/gram powder  Commonly known as: Mycostatin  Apply 1 Application topically 2 times a day.  Medication Adjustments for Surgery: Take/Use as prescribed     pantoprazole 40 mg EC tablet  Commonly known as: ProtoNix  Take 1 tablet (40 mg) by mouth once daily in the morning. Take before meals.  Medication Adjustments for Surgery: Take on the morning of surgery     simvastatin 20 mg tablet  Commonly known as: Zocor  Take 1 tablet (20 mg) by mouth once daily at bedtime.  Medication Adjustments for Surgery: Do Not take on the morning of surgery     vitamin E 180 mg (400 unit) capsule  Additional Medication Adjustments for Surgery: Take last dose 7 days before surgery                     ** Use HIBICLENS soap FRIDAY, DEC 13th - TUESDAY, DEC 17th **  ** Use MOUTHWASH the night before and the morning of the procedure **       NPO Instructions:    **AFTER MIDNIGHT - ONLY WATER, BLACK COFFEE, TEA, GATORADE, CLEAR SODA**  NO GUM, CANDY OR MINTS IN THE MORNING!    STOP DRINKING 3 HOURS PRIOR TO PROCEDURE.        Additional Instructions:      Review your medication instructions, take indicated medications and STOP those when applicable.  Wear comfortable, loose fitting clothing.  Please remove ALL jewelry and body piercing's.   All valuables should be left at home.  Bring a glass case or container/solution for  contacts.  Bring Photo ID and Insurance card.     Park in back of hospital by ER. Come up to Second floor-OUTPATIENT dept to check-in.    You MUST have an adult  with you. NO DRIVING FOR 24 HOURS AFTER SURGERY.     If you get ill at all the week before your procedure- CALL YOUR DOCTOR/SURGEON.  Any illness might lead to your procedure being delayed.       Call Outpatient dept at [phone] between 1-3pm the day before your procedure (Friday for Monday procedure), for your arrival time.

## 2024-12-09 NOTE — PROGRESS NOTES
Subjective   Patient ID: Reanna Pineda is a 64 y.o. female who presents for sinus drainage (Raspy voice).    HPI       Review of Systems    Objective   Lab Results   Component Value Date    HGBA1C 6.0 (H) 11/18/2024      There were no vitals taken for this visit.    Physical Exam    Assessment/Plan   There are no diagnoses linked to this encounter.

## 2024-12-10 ENCOUNTER — TELEPHONE (OUTPATIENT)
Dept: GASTROENTEROLOGY | Facility: CLINIC | Age: 64
End: 2024-12-10
Payer: MEDICARE

## 2024-12-10 RX ORDER — AZITHROMYCIN 250 MG/1
TABLET, FILM COATED ORAL
Qty: 6 TABLET | Refills: 0 | Status: SHIPPED | OUTPATIENT
Start: 2024-12-10 | End: 2024-12-15

## 2024-12-10 NOTE — TELEPHONE ENCOUNTER
Please see information from PAT appointment and attempted virtual visit with Dr. Davis. Patient not feeling well and wishes to be started on antibiotics so surgery can proceed. Dr. Fernandez out of office.   Please advise.

## 2024-12-10 NOTE — TELEPHONE ENCOUNTER
Patient spoke with Yumiko at Dr. Fernandez's office. Isreal needs prior authorization.   PA started via [employer].   Patient

## 2024-12-12 LAB — STAPHYLOCOCCUS SPEC CULT: NORMAL

## 2024-12-17 ENCOUNTER — HOSPITAL ENCOUNTER (OUTPATIENT)
Facility: HOSPITAL | Age: 64
Setting detail: OUTPATIENT SURGERY
Discharge: HOME | End: 2024-12-17
Attending: SURGERY | Admitting: SURGERY
Payer: MEDICARE

## 2024-12-17 ENCOUNTER — ANESTHESIA (OUTPATIENT)
Dept: OPERATING ROOM | Facility: HOSPITAL | Age: 64
End: 2024-12-17
Payer: MEDICARE

## 2024-12-17 ENCOUNTER — APPOINTMENT (OUTPATIENT)
Dept: RADIOLOGY | Facility: HOSPITAL | Age: 64
End: 2024-12-17
Payer: MEDICARE

## 2024-12-17 VITALS
WEIGHT: 200 LBS | HEIGHT: 67 IN | OXYGEN SATURATION: 94 % | SYSTOLIC BLOOD PRESSURE: 107 MMHG | RESPIRATION RATE: 21 BRPM | TEMPERATURE: 97.2 F | HEART RATE: 84 BPM | DIASTOLIC BLOOD PRESSURE: 76 MMHG | BODY MASS INDEX: 31.39 KG/M2

## 2024-12-17 DIAGNOSIS — N60.91 ATYPICAL DUCTAL HYPERPLASIA OF RIGHT BREAST: Primary | ICD-10-CM

## 2024-12-17 PROBLEM — T88.53XA AWARENESS UNDER ANESTHESIA: Status: ACTIVE | Noted: 2024-12-17

## 2024-12-17 PROCEDURE — 2500000001 HC RX 250 WO HCPCS SELF ADMINISTERED DRUGS (ALT 637 FOR MEDICARE OP): Performed by: SURGERY

## 2024-12-17 PROCEDURE — 76098 X-RAY EXAM SURGICAL SPECIMEN: CPT | Performed by: RADIOLOGY

## 2024-12-17 PROCEDURE — 3700000001 HC GENERAL ANESTHESIA TIME - INITIAL BASE CHARGE: Performed by: SURGERY

## 2024-12-17 PROCEDURE — 3600000007 HC OR TIME - EACH INCREMENTAL 1 MINUTE - PROCEDURE LEVEL TWO: Performed by: SURGERY

## 2024-12-17 PROCEDURE — 2500000001 HC RX 250 WO HCPCS SELF ADMINISTERED DRUGS (ALT 637 FOR MEDICARE OP)

## 2024-12-17 PROCEDURE — 2720000007 HC OR 272 NO HCPCS: Performed by: SURGERY

## 2024-12-17 PROCEDURE — 76098 X-RAY EXAM SURGICAL SPECIMEN: CPT

## 2024-12-17 PROCEDURE — 7100000002 HC RECOVERY ROOM TIME - EACH INCREMENTAL 1 MINUTE: Performed by: SURGERY

## 2024-12-17 PROCEDURE — 88307 TISSUE EXAM BY PATHOLOGIST: CPT | Performed by: STUDENT IN AN ORGANIZED HEALTH CARE EDUCATION/TRAINING PROGRAM

## 2024-12-17 PROCEDURE — 2500000004 HC RX 250 GENERAL PHARMACY W/ HCPCS (ALT 636 FOR OP/ED): Performed by: NURSE ANESTHETIST, CERTIFIED REGISTERED

## 2024-12-17 PROCEDURE — 2500000004 HC RX 250 GENERAL PHARMACY W/ HCPCS (ALT 636 FOR OP/ED): Mod: TB | Performed by: SURGERY

## 2024-12-17 PROCEDURE — 3600000002 HC OR TIME - INITIAL BASE CHARGE - PROCEDURE LEVEL TWO: Performed by: SURGERY

## 2024-12-17 PROCEDURE — 7100000001 HC RECOVERY ROOM TIME - INITIAL BASE CHARGE: Performed by: SURGERY

## 2024-12-17 PROCEDURE — 2500000005 HC RX 250 GENERAL PHARMACY W/O HCPCS

## 2024-12-17 PROCEDURE — 3700000002 HC GENERAL ANESTHESIA TIME - EACH INCREMENTAL 1 MINUTE: Performed by: SURGERY

## 2024-12-17 PROCEDURE — 7100000010 HC PHASE TWO TIME - EACH INCREMENTAL 1 MINUTE: Performed by: SURGERY

## 2024-12-17 PROCEDURE — 7100000009 HC PHASE TWO TIME - INITIAL BASE CHARGE: Performed by: SURGERY

## 2024-12-17 PROCEDURE — 19125 EXCISION BREAST LESION: CPT | Performed by: SURGERY

## 2024-12-17 PROCEDURE — 88307 TISSUE EXAM BY PATHOLOGIST: CPT | Mod: TC,GENLAB | Performed by: SURGERY

## 2024-12-17 RX ORDER — KETOROLAC TROMETHAMINE 30 MG/ML
INJECTION, SOLUTION INTRAMUSCULAR; INTRAVENOUS AS NEEDED
Status: DISCONTINUED | OUTPATIENT
Start: 2024-12-17 | End: 2024-12-17

## 2024-12-17 RX ORDER — MIDAZOLAM HYDROCHLORIDE 1 MG/ML
INJECTION INTRAMUSCULAR; INTRAVENOUS AS NEEDED
Status: DISCONTINUED | OUTPATIENT
Start: 2024-12-17 | End: 2024-12-17

## 2024-12-17 RX ORDER — ONDANSETRON HYDROCHLORIDE 2 MG/ML
INJECTION, SOLUTION INTRAVENOUS AS NEEDED
Status: DISCONTINUED | OUTPATIENT
Start: 2024-12-17 | End: 2024-12-17

## 2024-12-17 RX ORDER — OXYCODONE HYDROCHLORIDE 5 MG/1
5 TABLET ORAL EVERY 4 HOURS PRN
Status: DISCONTINUED | OUTPATIENT
Start: 2024-12-17 | End: 2024-12-17 | Stop reason: HOSPADM

## 2024-12-17 RX ORDER — ONDANSETRON HYDROCHLORIDE 2 MG/ML
4 INJECTION, SOLUTION INTRAVENOUS ONCE AS NEEDED
Status: DISCONTINUED | OUTPATIENT
Start: 2024-12-17 | End: 2024-12-17 | Stop reason: HOSPADM

## 2024-12-17 RX ORDER — IBUPROFEN 600 MG/1
600 TABLET ORAL EVERY 6 HOURS PRN
Qty: 20 TABLET | Refills: 0 | Status: SHIPPED | OUTPATIENT
Start: 2024-12-17 | End: 2024-12-27

## 2024-12-17 RX ORDER — LIDOCAINE HYDROCHLORIDE 20 MG/ML
INJECTION, SOLUTION INFILTRATION; PERINEURAL AS NEEDED
Status: DISCONTINUED | OUTPATIENT
Start: 2024-12-17 | End: 2024-12-17

## 2024-12-17 RX ORDER — CLINDAMYCIN PHOSPHATE 600 MG/50ML
600 INJECTION, SOLUTION INTRAVENOUS ONCE
Status: DISCONTINUED | OUTPATIENT
Start: 2024-12-17 | End: 2024-12-17 | Stop reason: HOSPADM

## 2024-12-17 RX ORDER — PROPOFOL 10 MG/ML
INJECTION, EMULSION INTRAVENOUS AS NEEDED
Status: DISCONTINUED | OUTPATIENT
Start: 2024-12-17 | End: 2024-12-17

## 2024-12-17 RX ORDER — CHLORHEXIDINE GLUCONATE ORAL RINSE 1.2 MG/ML
15 SOLUTION DENTAL AS NEEDED
COMMUNITY
Start: 2024-12-03

## 2024-12-17 RX ORDER — PHENYLEPHRINE HYDROCHLORIDE 10 MG/ML
INJECTION INTRAVENOUS AS NEEDED
Status: DISCONTINUED | OUTPATIENT
Start: 2024-12-17 | End: 2024-12-17

## 2024-12-17 RX ORDER — FENTANYL CITRATE 50 UG/ML
INJECTION, SOLUTION INTRAMUSCULAR; INTRAVENOUS AS NEEDED
Status: DISCONTINUED | OUTPATIENT
Start: 2024-12-17 | End: 2024-12-17

## 2024-12-17 RX ORDER — SODIUM CHLORIDE 9 MG/ML
INJECTION, SOLUTION INTRAVENOUS CONTINUOUS PRN
Status: DISCONTINUED | OUTPATIENT
Start: 2024-12-17 | End: 2024-12-17

## 2024-12-17 RX ORDER — CELECOXIB 100 MG/1
200 CAPSULE ORAL ONCE
Status: COMPLETED | OUTPATIENT
Start: 2024-12-17 | End: 2024-12-17

## 2024-12-17 RX ORDER — FENTANYL CITRATE 50 UG/ML
25 INJECTION, SOLUTION INTRAMUSCULAR; INTRAVENOUS EVERY 5 MIN PRN
Status: DISCONTINUED | OUTPATIENT
Start: 2024-12-17 | End: 2024-12-17 | Stop reason: HOSPADM

## 2024-12-17 RX ORDER — ACETAMINOPHEN 325 MG/1
650 TABLET ORAL EVERY 4 HOURS PRN
Status: DISCONTINUED | OUTPATIENT
Start: 2024-12-17 | End: 2024-12-17 | Stop reason: HOSPADM

## 2024-12-17 RX ADMIN — OXYCODONE 5 MG: 5 TABLET ORAL at 10:55

## 2024-12-17 RX ADMIN — CELECOXIB 200 MG: 100 CAPSULE ORAL at 08:54

## 2024-12-17 RX ADMIN — POVIDONE-IODINE 1 APPLICATION: 5 SOLUTION TOPICAL at 09:08

## 2024-12-17 SDOH — HEALTH STABILITY: MENTAL HEALTH: CURRENT SMOKER: 0

## 2024-12-17 ASSESSMENT — COLUMBIA-SUICIDE SEVERITY RATING SCALE - C-SSRS
2. HAVE YOU ACTUALLY HAD ANY THOUGHTS OF KILLING YOURSELF?: NO
6. HAVE YOU EVER DONE ANYTHING, STARTED TO DO ANYTHING, OR PREPARED TO DO ANYTHING TO END YOUR LIFE?: NO
1. IN THE PAST MONTH, HAVE YOU WISHED YOU WERE DEAD OR WISHED YOU COULD GO TO SLEEP AND NOT WAKE UP?: NO

## 2024-12-17 ASSESSMENT — PAIN - FUNCTIONAL ASSESSMENT
PAIN_FUNCTIONAL_ASSESSMENT: 0-10

## 2024-12-17 ASSESSMENT — PAIN SCALES - GENERAL
PAINLEVEL_OUTOF10: 0 - NO PAIN
PAINLEVEL_OUTOF10: 3
PAIN_LEVEL: 0
PAINLEVEL_OUTOF10: 3

## 2024-12-17 NOTE — ANESTHESIA POSTPROCEDURE EVALUATION
"Patient: Reanna Pineda \"Joanna\"    Procedure Summary       Date: 12/17/24 Room / Location: [facility] / [facility]    Anesthesia Start: 0915 Anesthesia Stop: 1028    Procedure: Right breast magseed lumpectomy (Right: Breast) Diagnosis:       Atypical ductal hyperplasia of right breast      (Atypical ductal hyperplasia of right breast [N60.91])    Surgeons: Sherwin Fernandez MD Responsible Provider: JODY Thompson    Anesthesia Type: general ASA Status: 3            Anesthesia Type: general    Vitals Value Taken Time   /75 12/17/24 1033   Temp 36.2 °C (97.2 °F) 12/17/24 1028   Pulse 76 12/17/24 1033   Resp 12 12/17/24 1033   SpO2 94 % 12/17/24 1033       Anesthesia Post Evaluation    Patient location during evaluation: PACU  Patient participation: complete - patient participated  Level of consciousness: awake and alert  Pain score: 0  Pain management: adequate  Airway patency: patent  Cardiovascular status: acceptable and stable  Respiratory status: acceptable and room air  Hydration status: acceptable  Postoperative Nausea and Vomiting: none        There were no known notable events for this encounter.    "

## 2024-12-17 NOTE — ANESTHESIA PROCEDURE NOTES
Airway  Date/Time: 12/17/2024 9:21 AM  Urgency: elective    Airway not difficult    Staffing  Performed: CRNA   Authorized by: JODY Thompson    Performed by: JODY Thompson  Patient location during procedure: OR    Indications and Patient Condition  Indications for airway management: anesthesia  Spontaneous Ventilation: absent  Sedation level: deep  Preoxygenated: yes  Patient position: sniffing  MILS maintained throughout  Mask difficulty assessment: 0 - not attempted    Final Airway Details  Final airway type: supraglottic airway      Successful airway: Size 4     Number of attempts at approach: 1  Number of other approaches attempted: 0

## 2024-12-17 NOTE — OP NOTE
"Right breast magseed lumpectomy (R) Operative Note     Date: 2024  OR Location: [facility]    Name: Reanna Pineda \"Joanna\", : 1960, Age: 64 y.o., MRN: [id number], Sex: female    Diagnosis  Pre-op Diagnosis      * Atypical ductal hyperplasia of right breast [N60.91] Post-op Diagnosis     * Atypical ductal hyperplasia of right breast [N60.91]     Procedures  Right breast magseed lumpectomy   - FL EXC BREAST LES PREOP PLMT RAD MARKER OPEN 1 LES      Surgeons      * Sherwin Fernandez - Primary    Resident/Fellow/Other Assistant:  Surgeons and Role:  * No surgeons found with a matching role *    Staff:   Surgical Assistant: Marin Gates Person: Myrtle  Circulator: Tina  Circulator: Mariella    Anesthesia Staff: CRNA: SHEREE Thompson-CRNA    Procedure Summary  Anesthesia: General  ASA: III  Estimated Blood Loss: 1 mL  Intra-op Medications:   Administrations occurring from 0915 to 1045 on 24:   Medication Name Total Dose   BUPivacaine HCl (Marcaine) 0.5 % (5 mg/mL) 22.5 mL, lidocaine PF (Xylocaine) 10 mg/mL (1 %) 22.5 mL syringe 34 mL   dexAMETHasone (Decadron) injection 4 mg/mL 8 mg   fentaNYL (Sublimaze) injection 50 mcg/mL 100 mcg   ketorolac (Toradol) injection 30 mg 30 mg   lidocaine (Xylocaine) injection 2 % 40 mg   midazolam PF (Versed) injection 1 mg/mL 2 mg   ondansetron (Zofran) 2 mg/mL injection 4 mg   phenylephrine (Valdez-Synephrine) injection 400 mcg   propofol (Diprivan) IV infusion 200 mg   sodium chloride 0.9 % infusion 100 mL              Anesthesia Record               Intraprocedure I/O Totals          Intake    Propofol Drip 0.00 mL    Unable to calculate total volume for this row as Anesthesia Start has not been filed.    sodium chloride 0.9% 250.00 mL    Total Intake 250 mL          Specimen:   ID Type Source Tests Collected by Time   1 : Suture Elliott Long Lateral, Short Superior Tissue BREAST, EXCISION OF MASS RIGHT SURGICAL PATHOLOGY EXAM Sherwin Fernandez MD 2024 0948    " "             Drains and/or Catheters: * None in log *    Tourniquet Times:         Implants:     Findings: Breast specimen containing Magseed and clip were removed and imaged with specimen walk free confirming presence of clip and Magseed within the specimen.  Long suture lateral short suture superior as marking suture    Indications: Reanna Pineda \"Luanne" is an 64 y.o. female who is having surgery for Atypical ductal hyperplasia of right breast [N60.91].  Patient presented with a mammographic abnormality of the right breast.  Image guided biopsy confirmed atypical ductal hyperplasia with radial scar.  Based on these findings felt appropriate to recommend a Magseed lumpectomy.  Patient understood and agreed to the plan as outlined.    The patient was seen in the preoperative area. The risks, benefits, complications, treatment options, non-operative alternatives, expected recovery and outcomes were discussed with the patient. The possibilities of reaction to medication, pulmonary aspiration, injury to surrounding structures, bleeding, recurrent infection, the need for additional procedures, failure to diagnose a condition, and creating a complication requiring transfusion or operation were discussed with the patient. The patient concurred with the proposed plan, giving informed consent.  The site of surgery was properly noted/marked if necessary per policy. The patient has been actively warmed in preoperative area. Preoperative antibiotics have been ordered and given within 1 hours of incision. Venous thrombosis prophylaxis have been ordered including bilateral sequential compression devices    Procedure Details: After obtaining informed consent the patient and discussing all the risks which include but not limited to pain, infection, bleeding, risk of positive margins, need for re-excision, risk of cardiac, pulmonary, neurologic, locomotor, anesthetic events, and other unforeseen complications including death, the " right breast was the preoperative holding area.  After induction of general anesthesia the right breast was interrogated with the Sentimag device.  The Magseed was identified.  High-frequency ultrasound transducer was used to identify the clip.  I reviewed the images.  The right breast was prepped and draped in aseptic fashion.  An inframammary medial incision was made flaps were created just above the site of the Magseed.  I confirmed the presence of the Magseed with the Sentimag device.  Then marked this with a marking suture.  I also used ultrasound to confirm the presence of the clip.  I then circumferentially excised the breast tissue which contained the marking suture.  This was removed entirely containing the clip and the Magseed.  I interrogated this ex vivo.  The vector margin marker was then used to danisha the margins with the appropriate colors.  This was sent to mammography which confirmed the presence of the clip and the seed within the specimen.  The cavity is irrigated copiously.  There was complete hemostasis.  Breast tissue flaps were created close the cavity.  Clips were used to danisha the cavity.  I reconfirmed hemostasis.  Tissue was opposed with interrupted 3-0 Vicryl suture.The subcutaneous tissue was closed with 3-0 Vicryl suture.  The skin was closed with 4 Monocryl suture.  Dressings were placed.  All sponge and instrument counts were correct x 2.  The patient tolerated the procedure well and was discharged to the recovery room in stable condition.  Complications:  None; patient tolerated the procedure well.    Disposition: PACU - hemodynamically stable.  Condition: stable         Task Performed by RNFA or Surgical Assistant:  The surgical assistant assisted with positioning, preparation of the surgical site, tissue retraction, suctioning, as well as primary closure and dressing application.       Additional Details: 40 minutes.  Wound classification 1.  Body mass index 31    Attending  Attestation: I performed the procedure.    Sherwin Fernandez  Phone Number: [phone]

## 2024-12-17 NOTE — PERIOPERATIVE NURSING NOTE
1036 Patient awake sitting up snacks provided family at bedside. States breast area sore.   1045 RN  messaged Zulema for orders for pain med  1055 Medicated with oxycodone for Complaint of burning on right breast 3/10  1104 RN BJ reviewing discharge instructions with patient and family  1117 Patient ready for discharge   1120 patient getting dressed

## 2024-12-18 ASSESSMENT — PAIN SCALES - GENERAL: PAINLEVEL_OUTOF10: 0 - NO PAIN

## 2024-12-20 DIAGNOSIS — R05.1 ACUTE COUGH: Primary | ICD-10-CM

## 2024-12-20 RX ORDER — DEXTROMETHORPHAN HBR AND GUAIFENESIN 5; 100 MG/5ML; MG/5ML
5 LIQUID ORAL 4 TIMES DAILY PRN
Qty: 840 ML | Refills: 0 | Status: SHIPPED | OUTPATIENT
Start: 2024-12-20 | End: 2025-01-19

## 2025-01-07 LAB
LAB AP ASR DISCLAIMER: NORMAL
LABORATORY COMMENT REPORT: NORMAL
PATH REPORT.COMMENTS IMP SPEC: NORMAL
PATH REPORT.FINAL DX SPEC: NORMAL
PATH REPORT.GROSS SPEC: NORMAL
PATH REPORT.RELEVANT HX SPEC: NORMAL
PATH REPORT.TOTAL CANCER: NORMAL

## 2025-01-07 PROCEDURE — 88360 TUMOR IMMUNOHISTOCHEM/MANUAL: CPT | Performed by: STUDENT IN AN ORGANIZED HEALTH CARE EDUCATION/TRAINING PROGRAM

## 2025-01-07 PROCEDURE — 88342 IMHCHEM/IMCYTCHM 1ST ANTB: CPT | Performed by: STUDENT IN AN ORGANIZED HEALTH CARE EDUCATION/TRAINING PROGRAM

## 2025-01-07 PROCEDURE — 88341 IMHCHEM/IMCYTCHM EA ADD ANTB: CPT | Performed by: STUDENT IN AN ORGANIZED HEALTH CARE EDUCATION/TRAINING PROGRAM

## 2025-01-08 ENCOUNTER — TELEPHONE (OUTPATIENT)
Dept: SURGERY | Facility: CLINIC | Age: 65
End: 2025-01-08
Payer: MEDICARE

## 2025-01-08 NOTE — TELEPHONE ENCOUNTER
----- Message from Sherwin Fernandez sent at 1/8/2025  7:07 AM EST -----  Let her know her biopsy did not show any cancer and I will discuss with her at her appt

## 2025-01-15 ENCOUNTER — APPOINTMENT (OUTPATIENT)
Dept: SURGERY | Facility: CLINIC | Age: 65
End: 2025-01-15
Payer: MEDICARE

## 2025-01-15 VITALS
TEMPERATURE: 97.7 F | BODY MASS INDEX: 30.62 KG/M2 | DIASTOLIC BLOOD PRESSURE: 84 MMHG | SYSTOLIC BLOOD PRESSURE: 141 MMHG | HEART RATE: 86 BPM | WEIGHT: 202 LBS | HEIGHT: 68 IN

## 2025-01-15 DIAGNOSIS — N60.91 ATYPICAL DUCTAL HYPERPLASIA OF RIGHT BREAST: Primary | ICD-10-CM

## 2025-01-15 PROCEDURE — 1036F TOBACCO NON-USER: CPT | Performed by: SURGERY

## 2025-01-15 PROCEDURE — 3008F BODY MASS INDEX DOCD: CPT | Performed by: SURGERY

## 2025-01-15 PROCEDURE — 99024 POSTOP FOLLOW-UP VISIT: CPT | Performed by: SURGERY

## 2025-01-15 PROCEDURE — 1159F MED LIST DOCD IN RCRD: CPT | Performed by: SURGERY

## 2025-01-15 PROCEDURE — 1160F RVW MEDS BY RX/DR IN RCRD: CPT | Performed by: SURGERY

## 2025-01-15 NOTE — PATIENT INSTRUCTIONS
You have atypical ductal hyperplasia of the right breast.  This puts you at a slightly higher risk of breast cancer.  You should continue with a healthy diet and have yearly screening mammograms.  I have ordered your mammogram for August 9, 2025.  We will contact you with that result

## 2025-01-15 NOTE — PROGRESS NOTES
"  Patient ID: Reanna Pineda \"Luanne" is a 64 y.o. female.  Following up after a right Magseed lumpectomy for atypical ductal hyperplasia.  Noted to have radial scar and atypical lobular hyperplasia.  Chantelle risk assessment model calculates a 5-year risk of 3.8% and a lifetime risk of 14.5% for breast cancer.      Objective   Physical Exam  Chest:   Breasts:     Right: Normal.      Left: Normal.      Comments: Right breast incision healed very nicely.    Review of pathology  Surgical Pathology Exam: [id number]  Order: [id number]   Collected 12/17/2024 09:48       Status: Final result       Visible to patient: Yes (not seen)       Dx: Atypical ductal hyperplasia of right ...    1 Result Note       1 Follow-up Encounter       Component  Resulting Agency   FINAL DIAGNOSIS   A. RIGHT BREAST MASS, EXCISION:      -- Atypical ductal hyperplasia, see note.  -- Usual ductal hyperplasia.  -- Radial scar.  -- Intraductal papilloma.  -- Columnar cell change.  -- Apocrine metaplasia and cysts.  -- Microcalcifications and associated with benign ducts and lobules.  -- Prior biopsy site changes.     Note: The atypical ductal proliferation shows negative CK5/6 staining and diffuse ER staining supporting the diagnosis. Immunohistochemical stain for e-cadherin is positive supporting ductal phenotype.          Problem List Items Addressed This Visit       Atypical ductal hyperplasia of right breast - Primary        Assessment/Plan   Plan-Chantelle risk assessment discussed with the patient.  She wishes to be followed conservatively  Repeat mammogram in August 2025.  Continue to follow with primary care physician.  "

## 2025-01-29 DIAGNOSIS — K21.9 GASTROESOPHAGEAL REFLUX DISEASE WITHOUT ESOPHAGITIS: ICD-10-CM

## 2025-01-29 RX ORDER — PANTOPRAZOLE SODIUM 40 MG/1
40 TABLET, DELAYED RELEASE ORAL
Qty: 90 TABLET | Refills: 1 | Status: SHIPPED | OUTPATIENT
Start: 2025-01-29 | End: 2026-01-29

## 2025-05-19 ENCOUNTER — APPOINTMENT (OUTPATIENT)
Dept: PRIMARY CARE | Facility: CLINIC | Age: 65
End: 2025-05-19
Payer: MEDICARE

## 2025-08-11 ENCOUNTER — HOSPITAL ENCOUNTER (OUTPATIENT)
Dept: RADIOLOGY | Facility: HOSPITAL | Age: 65
Discharge: HOME | End: 2025-08-11
Payer: MEDICARE

## 2025-08-11 VITALS — WEIGHT: 202 LBS | BODY MASS INDEX: 30.62 KG/M2 | HEIGHT: 68 IN

## 2025-08-11 DIAGNOSIS — N60.91 ATYPICAL DUCTAL HYPERPLASIA OF RIGHT BREAST: ICD-10-CM

## 2025-08-11 DIAGNOSIS — C50.311 MALIGNANT NEOPLASM OF LOWER-INNER QUADRANT OF RIGHT FEMALE BREAST: ICD-10-CM

## 2025-08-11 PROCEDURE — 77066 DX MAMMO INCL CAD BI: CPT | Performed by: RADIOLOGY

## 2025-08-11 PROCEDURE — 77062 BREAST TOMOSYNTHESIS BI: CPT | Performed by: RADIOLOGY

## 2025-08-11 PROCEDURE — 77062 BREAST TOMOSYNTHESIS BI: CPT

## 2025-08-12 ENCOUNTER — RESULTS FOLLOW-UP (OUTPATIENT)
Dept: PRIMARY CARE | Facility: CLINIC | Age: 65
End: 2025-08-12
Payer: MEDICARE

## 2025-08-12 DIAGNOSIS — R92.8 ABNORMAL MAMMOGRAM: Primary | ICD-10-CM

## 2025-08-30 ENCOUNTER — OFFICE VISIT (OUTPATIENT)
Dept: URGENT CARE | Facility: URGENT CARE | Age: 65
End: 2025-08-30
Payer: MEDICARE

## 2025-08-30 VITALS
SYSTOLIC BLOOD PRESSURE: 132 MMHG | HEIGHT: 68 IN | WEIGHT: 198.85 LBS | TEMPERATURE: 98.5 F | OXYGEN SATURATION: 97 % | HEART RATE: 89 BPM | DIASTOLIC BLOOD PRESSURE: 88 MMHG | BODY MASS INDEX: 30.14 KG/M2

## 2025-08-30 DIAGNOSIS — J98.8 WHEEZING-ASSOCIATED RESPIRATORY INFECTION: Primary | ICD-10-CM

## 2025-08-30 DIAGNOSIS — J20.6 ACUTE BRONCHITIS DUE TO RHINOVIRUS: ICD-10-CM

## 2025-08-30 DIAGNOSIS — J02.8 PHARYNGITIS DUE TO OTHER ORGANISM: ICD-10-CM

## 2025-08-30 LAB
POC HUMAN RHINOVIRUS PCR: POSITIVE
POC INFLUENZA A VIRUS PCR: NEGATIVE
POC INFLUENZA B VIRUS PCR: NEGATIVE
POC RESPIRATORY SYNCYTIAL VIRUS PCR: NEGATIVE
POC SARS-COV-2 AG BINAX: NORMAL
POC STREPTOCOCCUS PYOGENES (GROUP A STREP) PCR: NEGATIVE

## 2025-08-30 RX ORDER — ALBUTEROL SULFATE 90 UG/1
2 INHALANT RESPIRATORY (INHALATION) EVERY 4 HOURS PRN
Qty: 8.5 G | Refills: 0 | Status: SHIPPED | OUTPATIENT
Start: 2025-08-30 | End: 2026-08-30

## 2025-08-30 RX ORDER — CETIRIZINE HYDROCHLORIDE 10 MG/1
10 TABLET ORAL DAILY
Qty: 30 TABLET | Refills: 0 | Status: SHIPPED | OUTPATIENT
Start: 2025-08-30 | End: 2025-09-29

## 2025-08-30 RX ORDER — INHALER, ASSIST DEVICES
SPACER (EA) MISCELLANEOUS
Qty: 1 EACH | Refills: 0 | Status: SHIPPED | OUTPATIENT
Start: 2025-08-30 | End: 2026-08-30

## 2025-08-30 RX ORDER — METHYLPREDNISOLONE 4 MG/1
TABLET ORAL
Qty: 21 TABLET | Refills: 0 | Status: SHIPPED | OUTPATIENT
Start: 2025-08-30 | End: 2025-09-05

## 2025-08-30 RX ORDER — ALBUTEROL SULFATE 0.83 MG/ML
2.5 SOLUTION RESPIRATORY (INHALATION) ONCE
Status: COMPLETED | OUTPATIENT
Start: 2025-08-30 | End: 2025-08-30

## 2025-08-30 RX ORDER — FLUTICASONE PROPIONATE 50 MCG
1 SPRAY, SUSPENSION (ML) NASAL 2 TIMES DAILY
Qty: 16 G | Refills: 0 | Status: SHIPPED | OUTPATIENT
Start: 2025-08-30 | End: 2025-09-29

## 2025-08-30 RX ADMIN — ALBUTEROL SULFATE 2.5 MG: 0.83 SOLUTION RESPIRATORY (INHALATION) at 09:57

## 2025-08-30 ASSESSMENT — ENCOUNTER SYMPTOMS: COUGH: 1

## 2026-02-20 ENCOUNTER — APPOINTMENT (OUTPATIENT)
Dept: SURGERY | Facility: CLINIC | Age: 66
End: 2026-02-20
Payer: MEDICARE

## (undated) DEVICE — SUTURE, MONOCRYL, 4-0, 18 IN, PS2, UNDYED

## (undated) DEVICE — COVER, TABLE, 44 X 75 IN, DISPOSABLE, LF, STERILE

## (undated) DEVICE — SLEEVE, SUCTION, E-SEP, 165MM

## (undated) DEVICE — SHEATH, PINNACLE, 10 CM,  6FR INTRODUCER, 6FR DIA, 2.5 CM DIALATOR

## (undated) DEVICE — GLOVE, SURGICAL, PROTEXIS PI , 7.0, PF, LF

## (undated) DEVICE — DRAPE PACK, GENERAL, CUSTOM, GENEVA

## (undated) DEVICE — MANIFOLD KIT, CUSTOM, GEAUGA

## (undated) DEVICE — CLIP, LIGATING, LIGACLIP EXTRA, MEDIUM, TITANIUM, WHITE

## (undated) DEVICE — SUTURE, SILK, 2-0, TIES, 12-30 IN, BLACK

## (undated) DEVICE — LABELING SYSTEM, CORRECT MEDICATION, OR, 4 FLAGS, 2SETS OF 24

## (undated) DEVICE — KIT, MARGINMARKER, 6 INK COLORS, STANDARD

## (undated) DEVICE — SURGICAL LIGHT,

## (undated) DEVICE — CLOSURE DEVICE, VASCULAR, MYNX, 6FR/7FR

## (undated) DEVICE — DRAPE, SHEET, FAN FOLDED, MEDIUM, 44 X 72 IN, DISPOSABLE, LF, STERILE

## (undated) DEVICE — SUTURE, POLYSORB* 3/0  30  VIOLET ON A GU-46 NEEDLE"

## (undated) DEVICE — SHEATH, GLIDESHEATH, SLENDER, 6FR 16CM

## (undated) DEVICE — SOLUTION, IRRIGATION, STERILE WATER, 1000 ML, POUR BOTTLE

## (undated) DEVICE — DRESSING, MEPILEX BORDER, POST-OP AG, 4 X 6 IN

## (undated) DEVICE — ANGIO KIT, LEFT HEART, LF, CUSTOM

## (undated) DEVICE — SUTURE, VICRYL, 3-0, 27 IN, SH

## (undated) DEVICE — CATHETER, OPTITORQUE, 5FR, TIG1, 1H/100CM

## (undated) DEVICE — COVER, PROBE, PULL UP ULTRASOUND KIT, 5 X 48

## (undated) DEVICE — RADIOGRAPHY DEVICE, SPECIMEN, TRANSPEC

## (undated) DEVICE — GLOVE, SURGICAL, PROTEXIS PI BLUE W/NEUTHERA, 7.0, PF, LF

## (undated) DEVICE — CATHETER, ANGIO, IMPULSE, FL4, 6 FR X 100 CM

## (undated) DEVICE — DRAPE, PAD, INSTRUMENT, MAGNETIC, MEDIUM, 10 X 16 IN, DISPOSABLE

## (undated) DEVICE — SUTURE, SILK, 4-0, 18 IN, FS2, BLACK

## (undated) DEVICE — PROCEDURE KIT, ULTRASOUND, W/STERILE GEL AND TRANSDUCER COVER W/BAND, LF.

## (undated) DEVICE — PAD, GROUNDING, ELECTROSURGICAL, W/9 FT CABLE, POLYHESIVE II, ADULT, LF

## (undated) DEVICE — CORRECT CLIPS

## (undated) DEVICE — ELECTRODE, ELECTROSURGICAL, BLADE, E-Z CLEAN, 4 IN

## (undated) DEVICE — APPLICATOR, CHLORAPREP, W/ORANGE TINT, 26ML

## (undated) DEVICE — 00000 VISIT COUNTER

## (undated) DEVICE — SOLUTION, IRRIGATION, SODIUM CHLORIDE 0.9%, 1000 ML, POUR BOTTLE

## (undated) DEVICE — CATHETER, ANGIO, IMPULSE, FR4, 6 FR X 100 CM

## (undated) DEVICE — HOLSTER, ELECTROSURGERY ACCESSORY, STERILE

## (undated) DEVICE — MICROINTRODUCER KIT, VSI, 5FR X 40CM, REGULAR

## (undated) DEVICE — DRESSING, TELFA, 3X4

## (undated) DEVICE — Device